# Patient Record
Sex: MALE | Race: WHITE | Employment: OTHER | URBAN - METROPOLITAN AREA
[De-identification: names, ages, dates, MRNs, and addresses within clinical notes are randomized per-mention and may not be internally consistent; named-entity substitution may affect disease eponyms.]

---

## 2017-05-18 ENCOUNTER — OFFICE VISIT (OUTPATIENT)
Dept: ORTHOPEDIC SURGERY | Age: 47
End: 2017-05-18

## 2017-05-18 VITALS — RESPIRATION RATE: 16 BRPM | BODY MASS INDEX: 23.1 KG/M2 | HEIGHT: 71 IN | WEIGHT: 165 LBS

## 2017-05-18 DIAGNOSIS — M25.511 RIGHT SHOULDER PAIN, UNSPECIFIED CHRONICITY: ICD-10-CM

## 2017-05-18 DIAGNOSIS — M75.101 TEAR OF RIGHT ROTATOR CUFF, UNSPECIFIED TEAR EXTENT: Primary | ICD-10-CM

## 2017-05-18 PROCEDURE — 99204 OFFICE O/P NEW MOD 45 MIN: CPT | Performed by: ORTHOPAEDIC SURGERY

## 2017-05-18 ASSESSMENT — ENCOUNTER SYMPTOMS
BLURRED VISION: 1
BACK PAIN: 1
RESPIRATORY NEGATIVE: 1
GASTROINTESTINAL NEGATIVE: 1

## 2017-05-31 ENCOUNTER — HOSPITAL ENCOUNTER (OUTPATIENT)
Dept: MRI IMAGING | Age: 47
Discharge: HOME | End: 2017-06-29
Attending: ORTHOPAEDIC SURGERY | Admitting: ORTHOPAEDIC SURGERY

## 2017-05-31 DIAGNOSIS — M75.101 RIGHT ROTATOR CUFF TEAR: ICD-10-CM

## 2017-06-02 ENCOUNTER — OFFICE VISIT (OUTPATIENT)
Dept: ORTHOPEDIC SURGERY | Age: 47
End: 2017-06-02

## 2017-06-02 ENCOUNTER — TELEPHONE (OUTPATIENT)
Dept: ORTHOPEDIC SURGERY | Age: 47
End: 2017-06-02

## 2017-06-02 VITALS — RESPIRATION RATE: 16 BRPM | HEIGHT: 71 IN | WEIGHT: 165 LBS | BODY MASS INDEX: 23.1 KG/M2

## 2017-06-02 DIAGNOSIS — M75.121 COMPLETE TEAR OF RIGHT ROTATOR CUFF: Primary | ICD-10-CM

## 2017-06-02 PROCEDURE — 99213 OFFICE O/P EST LOW 20 MIN: CPT | Performed by: ORTHOPAEDIC SURGERY

## 2017-06-21 ENCOUNTER — HOSPITAL ENCOUNTER (OUTPATIENT)
Dept: MRI IMAGING | Age: 47
Discharge: HOME | End: 2017-06-21
Attending: ORTHOPAEDIC SURGERY | Admitting: ORTHOPAEDIC SURGERY

## 2017-06-21 DIAGNOSIS — M75.101 RIGHT ROTATOR CUFF TEAR: ICD-10-CM

## 2017-06-21 DIAGNOSIS — M75.101 TEAR OF RIGHT ROTATOR CUFF, UNSPECIFIED TEAR EXTENT: ICD-10-CM

## 2017-06-22 ENCOUNTER — OFFICE VISIT (OUTPATIENT)
Dept: ORTHOPEDIC SURGERY | Age: 47
End: 2017-06-22

## 2017-06-22 VITALS
RESPIRATION RATE: 16 BRPM | SYSTOLIC BLOOD PRESSURE: 104 MMHG | HEIGHT: 71 IN | BODY MASS INDEX: 23.1 KG/M2 | WEIGHT: 165 LBS | DIASTOLIC BLOOD PRESSURE: 69 MMHG | HEART RATE: 88 BPM

## 2017-06-22 DIAGNOSIS — M75.121 COMPLETE TEAR OF RIGHT ROTATOR CUFF: Primary | ICD-10-CM

## 2017-06-22 PROCEDURE — 99213 OFFICE O/P EST LOW 20 MIN: CPT | Performed by: ORTHOPAEDIC SURGERY

## 2017-06-22 RX ORDER — NICOTINE 21 MG/24HR
PATCH, TRANSDERMAL 24 HOURS TRANSDERMAL
COMMUNITY
Start: 2015-11-28 | End: 2022-04-29

## 2017-06-22 RX ORDER — METHYLPREDNISOLONE 4 MG/1
TABLET ORAL
Refills: 0 | COMMUNITY
Start: 2017-06-13 | End: 2022-04-29

## 2017-06-22 RX ORDER — BUPRENORPHINE AND NALOXONE 2; .5 MG/1; MG/1
FILM, SOLUBLE BUCCAL; SUBLINGUAL
COMMUNITY
Start: 2015-11-28 | End: 2022-04-29

## 2017-07-06 ENCOUNTER — HOSPITAL ENCOUNTER (OUTPATIENT)
Dept: PREADMISSION TESTING | Age: 47
Discharge: HOME | End: 2017-08-04
Attending: ORTHOPAEDIC SURGERY | Admitting: ORTHOPAEDIC SURGERY

## 2017-07-06 ENCOUNTER — TELEPHONE (OUTPATIENT)
Dept: ORTHOPEDIC SURGERY | Age: 47
End: 2017-07-06

## 2017-07-06 DIAGNOSIS — M75.101 RIGHT ROTATOR CUFF TEAR: ICD-10-CM

## 2017-07-07 ENCOUNTER — TELEPHONE (OUTPATIENT)
Dept: ORTHOPEDIC SURGERY | Age: 47
End: 2017-07-07

## 2017-07-10 ENCOUNTER — HOSPITAL ENCOUNTER (OUTPATIENT)
Dept: SURGERY | Age: 47
Discharge: HOME | End: 2017-08-08
Attending: ORTHOPAEDIC SURGERY | Admitting: ORTHOPAEDIC SURGERY

## 2017-07-10 DIAGNOSIS — M75.101 RIGHT ROTATOR CUFF TEAR: ICD-10-CM

## 2017-07-17 ENCOUNTER — TELEPHONE (OUTPATIENT)
Dept: ORTHOPEDIC SURGERY | Age: 47
End: 2017-07-17

## 2017-08-03 ENCOUNTER — HOSPITAL ENCOUNTER (OUTPATIENT)
Dept: PREADMISSION TESTING | Age: 47
Discharge: HOME | End: 2017-09-01
Attending: ORTHOPAEDIC SURGERY | Admitting: ORTHOPAEDIC SURGERY

## 2017-12-14 ENCOUNTER — HOSPITAL ENCOUNTER (OUTPATIENT)
Dept: LAB | Age: 47
Discharge: HOME | End: 2017-12-14
Attending: FAMILY MEDICINE | Admitting: FAMILY MEDICINE

## 2017-12-14 LAB
ALBUMIN SERPL-MCNC: 4.5 GM/DL (ref 3.4–5)
ALP BLD-CCNC: 94 IU/L (ref 40–129)
ALT SERPL-CCNC: 57 U/L (ref 10–40)
ANION GAP SERPL CALCULATED.3IONS-SCNC: 13 MMOL/L (ref 4–16)
AST SERPL-CCNC: 41 IU/L (ref 15–37)
BILIRUB SERPL-MCNC: 0.7 MG/DL (ref 0–1)
BUN BLDV-MCNC: 11 MG/DL (ref 6–23)
CALCIUM SERPL-MCNC: 9.6 MG/DL (ref 8.3–10.6)
CHLORIDE BLD-SCNC: 102 MMOL/L (ref 99–110)
CHOLESTEROL: 193 MG/DL
CO2: 24 MMOL/L (ref 21–32)
CREAT SERPL-MCNC: 1 MG/DL (ref 0.9–1.3)
ERYTHROCYTE SEDIMENTATION RATE: 39 MM/HR (ref 0–15)
ESTIMATED AVERAGE GLUCOSE: 105 MG/DL
GFR AFRICAN AMERICAN: >60 ML/MIN/1.73M2
GFR NON-AFRICAN AMERICAN: >60 ML/MIN/1.73M2
GLUCOSE BLD-MCNC: 109 MG/DL (ref 70–99)
HBA1C MFR BLD: 5.3 % (ref 4.2–6.3)
HCT VFR BLD CALC: 39.4 % (ref 42–52)
HDLC SERPL-MCNC: 54 MG/DL
HEMOGLOBIN: 13.1 GM/DL (ref 13.5–18)
LDL CHOLESTEROL DIRECT: 128 MG/DL
MAGNESIUM: 2.2 MG/DL (ref 1.8–2.4)
MCH RBC QN AUTO: 29.3 PG (ref 27–31)
MCHC RBC AUTO-ENTMCNC: 33.2 % (ref 32–36)
MCV RBC AUTO: 88.1 FL (ref 78–100)
PDW BLD-RTO: 13.3 % (ref 11.7–14.9)
PHOSPHORUS: 3.4 MG/DL (ref 2.5–4.9)
PLATELET # BLD: 190 K/CU MM (ref 140–440)
PMV BLD AUTO: 11.5 FL (ref 7.5–11.1)
POTASSIUM SERPL-SCNC: 4.5 MMOL/L (ref 3.5–5.1)
PROSTATE SPECIFIC ANTIGEN: 0.37 NG/ML (ref 0–4)
RBC # BLD: 4.47 M/CU MM (ref 4.6–6.2)
SODIUM BLD-SCNC: 139 MMOL/L (ref 135–145)
T4 FREE: 1.17 NG/DL (ref 0.9–1.8)
TOTAL PROTEIN: 7.7 GM/DL (ref 6.4–8.2)
TRIGL SERPL-MCNC: 62 MG/DL
TSH HIGH SENSITIVITY: 0.4 UIU/ML (ref 0.27–4.2)
URIC ACID: 4.5 MG/DL (ref 3.5–7.2)
VITAMIN D 25-HYDROXY: 24.69 NG/ML
WBC # BLD: 5.8 K/CU MM (ref 4–10.5)

## 2018-07-05 ENCOUNTER — HOSPITAL ENCOUNTER (OUTPATIENT)
Dept: LAB | Age: 48
Discharge: HOME | End: 2018-07-05
Attending: FAMILY MEDICINE | Admitting: FAMILY MEDICINE

## 2018-07-05 LAB
ALBUMIN SERPL-MCNC: 3.6 GM/DL (ref 3.4–5)
ALBUMIN SERPL-MCNC: 3.6 GM/DL (ref 3.4–5)
ALP BLD-CCNC: 98 IU/L (ref 40–128)
ALP BLD-CCNC: 98 IU/L (ref 40–129)
ALT SERPL-CCNC: 67 U/L (ref 10–40)
ALT SERPL-CCNC: 67 U/L (ref 10–40)
ANION GAP SERPL CALCULATED.3IONS-SCNC: 11 MMOL/L (ref 4–16)
AST SERPL-CCNC: 19 IU/L (ref 15–37)
AST SERPL-CCNC: 19 IU/L (ref 15–37)
BILIRUB SERPL-MCNC: 0.3 MG/DL (ref 0–1)
BILIRUB SERPL-MCNC: 0.3 MG/DL (ref 0–1)
BILIRUBIN DIRECT: 0.2 MG/DL (ref 0–0.3)
BILIRUBIN, INDIRECT: 0.1 MG/DL (ref 0–0.7)
BUN BLDV-MCNC: 16 MG/DL (ref 6–23)
CALCIUM SERPL-MCNC: 8.9 MG/DL (ref 8.3–10.6)
CHLORIDE BLD-SCNC: 107 MMOL/L (ref 99–110)
CHOLESTEROL: 152 MG/DL
CO2: 22 MMOL/L (ref 21–32)
CREAT SERPL-MCNC: 1 MG/DL (ref 0.9–1.3)
ERYTHROCYTE SEDIMENTATION RATE: 33 MM/HR (ref 0–15)
ESTIMATED AVERAGE GLUCOSE: 126 MG/DL
GFR AFRICAN AMERICAN: >60 ML/MIN/1.73M2
GFR NON-AFRICAN AMERICAN: >60 ML/MIN/1.73M2
GLUCOSE BLD-MCNC: 104 MG/DL (ref 70–99)
HBA1C MFR BLD: 6 % (ref 4.2–6.3)
HCT VFR BLD CALC: 37.6 % (ref 42–52)
HDLC SERPL-MCNC: 35 MG/DL
HEMOGLOBIN: 12.3 GM/DL (ref 13.5–18)
LDL CHOLESTEROL CALCULATED: 95 MG/DL
MAGNESIUM: 2.2 MG/DL (ref 1.8–2.4)
MCH RBC QN AUTO: 29.9 PG (ref 27–31)
MCHC RBC AUTO-ENTMCNC: 32.7 % (ref 32–36)
MCV RBC AUTO: 91.3 FL (ref 78–100)
PDW BLD-RTO: 13.7 % (ref 11.7–14.9)
PLATELET # BLD: 211 K/CU MM (ref 140–440)
PMV BLD AUTO: 11.4 FL (ref 7.5–11.1)
POTASSIUM SERPL-SCNC: 4.6 MMOL/L (ref 3.5–5.1)
RBC # BLD: 4.12 M/CU MM (ref 4.6–6.2)
SODIUM BLD-SCNC: 140 MMOL/L (ref 135–145)
T4 FREE: 0.99 NG/DL (ref 0.9–1.8)
TOTAL PROTEIN: 6.9 GM/DL (ref 6.4–8.2)
TOTAL PROTEIN: 6.9 GM/DL (ref 6.4–8.2)
TRIGL SERPL-MCNC: 111 MG/DL
TSH HIGH SENSITIVITY: 0.41 UIU/ML (ref 0.27–4.2)
URIC ACID: 3.8 MG/DL (ref 3.5–7.2)
VITAMIN D 25-HYDROXY: 33.26 NG/ML
WBC # BLD: 7.6 K/CU MM (ref 4–10.5)

## 2018-09-04 ENCOUNTER — HOSPITAL ENCOUNTER (OUTPATIENT)
Dept: LAB | Age: 48
Discharge: HOME | End: 2018-09-04
Attending: FAMILY MEDICINE | Admitting: FAMILY MEDICINE

## 2018-09-04 LAB
FERRITIN: 104 NG/ML (ref 30–400)
HCT VFR BLD CALC: 41.1 % (ref 42–52)
HEMOGLOBIN: 13.3 GM/DL (ref 13.5–18)
IRON: 28 UG/DL (ref 59–158)
PCT TRANSFERRIN: 8 % (ref 10–44)
TOTAL IRON BINDING CAPACITY: 366 UG/DL (ref 250–450)
UNSATURATED IRON BINDING CAPACITY: 338 UG/DL (ref 110–370)

## 2019-05-01 ENCOUNTER — HOSPITAL ENCOUNTER (EMERGENCY)
Age: 49
Discharge: HOME | End: 2019-05-01

## 2019-05-01 VITALS
HEIGHT: 71 IN | RESPIRATION RATE: 16 BRPM | HEART RATE: 82 BPM | SYSTOLIC BLOOD PRESSURE: 128 MMHG | WEIGHT: 165 LBS | OXYGEN SATURATION: 99 % | BODY MASS INDEX: 23.1 KG/M2 | DIASTOLIC BLOOD PRESSURE: 88 MMHG | TEMPERATURE: 98.5 F

## 2019-05-01 DIAGNOSIS — T16.1XXA FOREIGN BODY OF RIGHT EAR, INITIAL ENCOUNTER: Primary | ICD-10-CM

## 2019-05-01 PROCEDURE — 99282 EMERGENCY DEPT VISIT SF MDM: CPT

## 2019-05-01 RX ORDER — CIPROFLOXACIN AND DEXAMETHASONE 3; 1 MG/ML; MG/ML
4 SUSPENSION/ DROPS AURICULAR (OTIC) 2 TIMES DAILY
Qty: 1 BOTTLE | Refills: 0 | Status: SHIPPED | OUTPATIENT
Start: 2019-05-01 | End: 2019-05-08

## 2019-05-01 NOTE — ED PROVIDER NOTES
Triage Chief Complaint:   Otalgia (right ear pain )    [place]:  Zaira Teixeira is a 52 y.o. male that presents toda for FB in the R ear  context is pt believes he had got a metal shard in ear 3 days ago while at [employer]ok    ROS:  REVIEW OF SYSTEMS    At least  06systems reviewed      All other review of systems are negative  See HPI and nursing notes for additional information       Past Medical History:   Diagnosis Date    Diabetes mellitus ([facility]     Hep C w/o coma, chronic ([facility]      Past Surgical History:   Procedure Laterality Date    CHOLECYSTECTOMY       History reviewed. No pertinent family history. Social History     Socioeconomic History    Marital status:       Spouse name: Not on file    Number of children: Not on file    Years of education: Not on file    Highest education level: Not on file   Occupational History    Not on file   Social Needs    Financial resource strain: Not on file    Food insecurity:     Worry: Not on file     Inability: Not on file    Transportation needs:     Medical: Not on file     Non-medical: Not on file   Tobacco Use    Smoking status: Current Every Day Smoker     Packs/day: 1.00     Types: Cigarettes    Smokeless tobacco: Never Used   Substance and Sexual Activity    Alcohol use: No    Drug use: Yes     Comment: HERION    Sexual activity: Not Currently     Partners: Female   Lifestyle    Physical activity:     Days per week: Not on file     Minutes per session: Not on file    Stress: Not on file   Relationships    Social connections:     Talks on phone: Not on file     Gets together: Not on file     Attends Denominational service: Not on file     Active member of club or organization: Not on file     Attends meetings of clubs or organizations: Not on file     Relationship status: Not on file    Intimate partner violence:     Fear of current or ex partner: Not on file     Emotionally abused: Not on file     Physically abused: Not on file     Forced sexual activity: Not on file   Other Topics Concern    Not on file   Social History Narrative    Not on file     No current facility-administered medications for this encounter. Current Outpatient Medications   Medication Sig Dispense Refill    ciprofloxacin-dexamethasone (CIPRODEX) 0.3-0.1 % otic suspension Place 4 drops in ear(s) 2 times daily for 7 days Apply in affected ear(s). Dispense QS 7 day course. 1 Bottle 0    fluticasone-vilanterol (BREO ELLIPTA) 100-25 MCG/INH AEPB inhaler Inhale into the lungs daily      gabapentin (NEURONTIN) 600 MG tablet Take 800 mg by mouth 3 times daily. Ardyth Li ibuprofen (ADVIL;MOTRIN) 800 MG tablet Take 1 tablet by mouth every 6 hours as needed for Pain 30 tablet 0    Buprenorphine HCl-Naloxone HCl 2-0.5 MG FILM Suboxone 2 mg/0.5 m tablets/strips SL q8hrs x 1 day, then  2 tablets/strips SL q12hrs x 2 days, then  2 tablets/strips SL qHS x 2 days, then Stop. May dispense either SL tablets or SL strips.     To address opioid withdrawal symptoms      nicotine (NICODERM CQ) 21 MG/24HR Nicotine 21 mg Patch- one daily x6 weeks, then  Nicotine 14 mg Patch- one daily x2 weeks, then  Nicotine 7 mg Patch- one daily x2 weeks  Dispense:  #42 of 21 mg patches, #14 of 14 mg patches, #14 of 7 mg patch    For smoking cessation      methylPREDNISolone (MEDROL DOSEPACK) 4 MG tablet TAKE 6 TABLETS ON DAY 1 AS DIRECTED ON PACKAGE AND DECREASE BY 1 TAB EACH DAY FOR A TOTAL OF 6 DAYS  0    naproxen (NAPROSYN) 500 MG tablet Take 1 tablet by mouth 2 times daily as needed for Pain 30 tablet 0    albuterol sulfate  (90 BASE) MCG/ACT inhaler Inhale 2 puffs into the lungs every 6 hours as needed for Wheezing      acetaminophen (AMINOFEN) 325 MG tablet Take 2 tablets by mouth every 6 hours as needed for Pain 120 tablet 3     Allergies   Allergen Reactions    Vicodin [Hydrocodone-Acetaminophen] Other (See Comments)     RED, SWELLING , ITCHING       Nursing Notes Reviewed    Physical Exam:  ED Triage Vitals [05/01/19 0208]   Enc Vitals Group      /88      Pulse 99      Resp 16      Temp 98.5 °F (36.9 °C)      Temp Source Oral      SpO2 97 %      Weight 165 lb (74.8 kg)      Height 5' 11\" (1.803 m)      Head Circumference       Peak Flow       Pain Score       Pain Loc       Pain Edu? Excl. in [address]? General :Patient is awake alert oriented person non toxic. cooperative  HEENT: Pupils are equally round and reactive to light extraocular motors are intact conjunctivae clear sclerae white there is no injection no icterus. Nose without any rhinorrhea or epistaxis. Oral mucosa is moist no exudate buccal mucosa shows no ulcerations. Uvula is midline    No FB in nares. Or mouth. auditory canal reveals FB. No tragal ttp. No bleeding no discharge. Neck: Neck is supple full range of motion trachea midline thyroid nonpalpable  Cardiac: Heart regular rate rhythm no murmurs rubs clicks or gallops  Lungs: Lungs are clear to auscultation there is no wheezing rhonchi or rales. There is no use of accessory muscles no nasal flaring identified. Dermatology: Skin is warm and dry there is no obvious abscesses lacerations or lesions noted  Psych: Mentation is grossly normal cognition is grossly normal. Affect is appropriate  Neuro:  Motor intact sensory intact cranial nerves II through XII are intact level of consciousness is normal cerebellar function is normal reflexes are grossly normal. No evidence of incontinence or loss of bowel or bladder no saddle anesthesia noted Lymphatic: There is no submandibular or cervical adenopathy appreciated.    ________________________________________________________________________       Procedure Note - Radha Stanley PA-C        Foreign Body Removal Procedure Note    Indication:  Foreign body in the R ear            Procedure:   During otoscopic evaluation a foreign body was visualized in the right ear which appeared to be metal.  The patient's head applicable):  Boo Harper MD  [address] [place] [address]  [phone]    In 2 days      Disposition medications (if applicable):  New Prescriptions    CIPROFLOXACIN-DEXAMETHASONE (CIPRODEX) 0.3-0.1 % OTIC SUSPENSION    Place 4 drops in ear(s) 2 times daily for 7 days Apply in affected ear(s). Dispense QS 7 day course. Comment: Please note this report has been produced using speech recognition software and may contain errors related to that system including errors in grammar, punctuation, and spelling, as well as words and phrases that may be inappropriate. If there are any questions or concerns please feel free to contact the dictating provider for clarification.       Hugo Umaña, [address] [place] [address]  05/01/19 2492

## 2019-05-01 NOTE — ED TRIAGE NOTES
Pt reports right ear feels like something is in there states he cleaned it out and noticed some blood

## 2019-05-01 NOTE — ED NOTES
Willow Narayanan at bedside to ear exam and to rinse out ear     Christiano Angelo RN  05/01/19 8206

## 2019-05-01 NOTE — ED NOTES
Discharge instructions reviewed. All questions answered to pt satisfaction. Pt alert, oriented, and ambulatory upon discharge.       Alyssa Moore RN  05/01/19 7406

## 2019-09-03 ENCOUNTER — APPOINTMENT (OUTPATIENT)
Dept: GENERAL RADIOLOGY | Age: 49
End: 2019-09-03

## 2019-09-03 ENCOUNTER — HOSPITAL ENCOUNTER (EMERGENCY)
Age: 49
Discharge: ANOTHER HOSPITAL | End: 2019-09-03
Attending: EMERGENCY MEDICINE

## 2019-09-03 VITALS
HEART RATE: 96 BPM | SYSTOLIC BLOOD PRESSURE: 138 MMHG | WEIGHT: 165 LBS | DIASTOLIC BLOOD PRESSURE: 86 MMHG | BODY MASS INDEX: 23.1 KG/M2 | HEIGHT: 71 IN | RESPIRATION RATE: 12 BRPM | OXYGEN SATURATION: 97 % | TEMPERATURE: 98.7 F

## 2019-09-03 DIAGNOSIS — M65.142 SUPPURATIVE TENOSYNOVITIS OF FLEXOR TENDON OF LEFT HAND: Primary | ICD-10-CM

## 2019-09-03 LAB
ALBUMIN SERPL-MCNC: 4 GM/DL (ref 3.4–5)
ALP BLD-CCNC: 86 IU/L (ref 40–129)
ALT SERPL-CCNC: 18 U/L (ref 10–40)
ANION GAP SERPL CALCULATED.3IONS-SCNC: 12 MMOL/L (ref 4–16)
AST SERPL-CCNC: 22 IU/L (ref 15–37)
BASOPHILS ABSOLUTE: 0 K/CU MM
BASOPHILS RELATIVE PERCENT: 0.1 % (ref 0–1)
BILIRUB SERPL-MCNC: 0.6 MG/DL (ref 0–1)
BUN BLDV-MCNC: 15 MG/DL (ref 6–23)
CALCIUM SERPL-MCNC: 8.9 MG/DL (ref 8.3–10.6)
CHLORIDE BLD-SCNC: 96 MMOL/L (ref 99–110)
CO2: 22 MMOL/L (ref 21–32)
CREAT SERPL-MCNC: 1 MG/DL (ref 0.9–1.3)
DIFFERENTIAL TYPE: ABNORMAL
EOSINOPHILS ABSOLUTE: 0 K/CU MM
EOSINOPHILS RELATIVE PERCENT: 0 % (ref 0–3)
GFR AFRICAN AMERICAN: >60 ML/MIN/1.73M2
GFR NON-AFRICAN AMERICAN: >60 ML/MIN/1.73M2
GLUCOSE BLD-MCNC: 233 MG/DL (ref 70–99)
HCT VFR BLD CALC: 33.3 % (ref 42–52)
HEMOGLOBIN: 10.3 GM/DL (ref 13.5–18)
IMMATURE NEUTROPHIL %: 0.4 % (ref 0–0.43)
LACTATE: 1.9 MMOL/L (ref 0.4–2)
LYMPHOCYTES ABSOLUTE: 0.5 K/CU MM
LYMPHOCYTES RELATIVE PERCENT: 6 % (ref 24–44)
MCH RBC QN AUTO: 28.3 PG (ref 27–31)
MCHC RBC AUTO-ENTMCNC: 30.9 % (ref 32–36)
MCV RBC AUTO: 91.5 FL (ref 78–100)
MONOCYTES ABSOLUTE: 0.6 K/CU MM
MONOCYTES RELATIVE PERCENT: 6.8 % (ref 0–4)
NUCLEATED RBC %: 0 %
PDW BLD-RTO: 13.1 % (ref 11.7–14.9)
PLATELET # BLD: 162 K/CU MM (ref 140–440)
PMV BLD AUTO: 11.3 FL (ref 7.5–11.1)
POTASSIUM SERPL-SCNC: 3.5 MMOL/L (ref 3.5–5.1)
RBC # BLD: 3.64 M/CU MM (ref 4.6–6.2)
SEGMENTED NEUTROPHILS ABSOLUTE COUNT: 7.3 K/CU MM
SEGMENTED NEUTROPHILS RELATIVE PERCENT: 86.7 % (ref 36–66)
SODIUM BLD-SCNC: 130 MMOL/L (ref 135–145)
TOTAL IMMATURE NEUTOROPHIL: 0.03 K/CU MM
TOTAL NUCLEATED RBC: 0 K/CU MM
TOTAL PROTEIN: 7.7 GM/DL (ref 6.4–8.2)
WBC # BLD: 8.4 K/CU MM (ref 4–10.5)

## 2019-09-03 PROCEDURE — 87040 BLOOD CULTURE FOR BACTERIA: CPT

## 2019-09-03 PROCEDURE — 96365 THER/PROPH/DIAG IV INF INIT: CPT

## 2019-09-03 PROCEDURE — 96367 TX/PROPH/DG ADDL SEQ IV INF: CPT

## 2019-09-03 PROCEDURE — 71045 X-RAY EXAM CHEST 1 VIEW: CPT

## 2019-09-03 PROCEDURE — 83605 ASSAY OF LACTIC ACID: CPT

## 2019-09-03 PROCEDURE — 2580000003 HC RX 258: Performed by: PHYSICIAN ASSISTANT

## 2019-09-03 PROCEDURE — 85025 COMPLETE CBC W/AUTO DIFF WBC: CPT

## 2019-09-03 PROCEDURE — 2580000003 HC RX 258

## 2019-09-03 PROCEDURE — 2580000003 HC RX 258: Performed by: EMERGENCY MEDICINE

## 2019-09-03 PROCEDURE — 99284 EMERGENCY DEPT VISIT MOD MDM: CPT

## 2019-09-03 PROCEDURE — 6360000002 HC RX W HCPCS: Performed by: EMERGENCY MEDICINE

## 2019-09-03 PROCEDURE — 73130 X-RAY EXAM OF HAND: CPT

## 2019-09-03 PROCEDURE — 80053 COMPREHEN METABOLIC PANEL: CPT

## 2019-09-03 PROCEDURE — 96366 THER/PROPH/DIAG IV INF ADDON: CPT

## 2019-09-03 PROCEDURE — 6360000002 HC RX W HCPCS: Performed by: PHYSICIAN ASSISTANT

## 2019-09-03 RX ORDER — SODIUM CHLORIDE 9 MG/ML
INJECTION, SOLUTION INTRAVENOUS
Status: COMPLETED
Start: 2019-09-03 | End: 2019-09-03

## 2019-09-03 RX ORDER — 0.9 % SODIUM CHLORIDE 0.9 %
30 INTRAVENOUS SOLUTION INTRAVENOUS ONCE
Status: COMPLETED | OUTPATIENT
Start: 2019-09-03 | End: 2019-09-03

## 2019-09-03 RX ADMIN — Medication 2000 ML: at 08:35

## 2019-09-03 RX ADMIN — CEFTRIAXONE SODIUM 1 G: 1 INJECTION, POWDER, FOR SOLUTION INTRAMUSCULAR; INTRAVENOUS at 08:36

## 2019-09-03 RX ADMIN — SODIUM CHLORIDE 2000 ML: 9 INJECTION, SOLUTION INTRAVENOUS at 08:35

## 2019-09-03 RX ADMIN — VANCOMYCIN HYDROCHLORIDE 1500 MG: 5 INJECTION, POWDER, LYOPHILIZED, FOR SOLUTION INTRAVENOUS at 09:31

## 2019-09-03 ASSESSMENT — PAIN SCALES - GENERAL: PAINLEVEL_OUTOF10: 5

## 2019-09-03 ASSESSMENT — PAIN DESCRIPTION - LOCATION: LOCATION: HAND

## 2019-09-03 ASSESSMENT — PAIN DESCRIPTION - PAIN TYPE: TYPE: ACUTE PAIN

## 2019-09-03 ASSESSMENT — PAIN DESCRIPTION - ORIENTATION: ORIENTATION: LEFT

## 2019-09-03 NOTE — ED PROVIDER NOTES
I independently examined and evaluated Claudeen Dawn. In brief, left and pain and swelling x 3-4 days. Purulent discharge from his finger. Hx of Hep C and IV drug use. Last used IV drugs last night. Denies fevers chills, chest pain or shortness of breath. Denies IV drug use in his hand. Patient does have very dirty hands and works as a . Vitals:    09/03/19 0759   BP: (!) 157/91   Pulse: 136   Resp: 18   Temp: 99.8 °F (37.7 °C)   SpO2: 92%     Focused exam revealed disheveled patient sitting up in the bed. Heart tachycardic but regular rhythm, lungs clear to auscultation bilaterally. 2+ radial pulses bilaterally. Multiple abrasions to his bilateral hands. Left middle finger is significantly swollen and held in flexion. Tender on the flexor tendon side. Small amount of purulent discharge from the distal end. ED course:  Basic labs and blood cultures obtained. Labs are unremarkable. There is diffuse swelling with no soft tissue gas on x-ray of his hand. Presentation is concerning for flexor tenosynovitis. Patient started on broad-spectrum antibiotics and will transfer him to outside hospital for further hand surgeon evaluation. Plan of care explained to patient. All questions and concerns were addressed to the patient's satisfaction. Patient understood and agreed with plan. All diagnostic, treatment, and disposition decisions were made by myself in conjunction with the advanced practice provider. For all further details of the patient's emergency department visit, please see the advanced practice provider's documentation. Comment: Please note this report has been produced using speech recognition software and may contain errors related to that system including errors in grammar, punctuation, and spelling, as well as words and phrases that may be inappropriate. If there are any questions or concerns please feel free to contact the dictating provider for clarification.

## 2019-09-03 NOTE — ED PROVIDER NOTES
(NICODERM ) 21 MG/24HR Nicotine 21 mg Patch- one daily x6 weeks, then  Nicotine 14 mg Patch- one daily x2 weeks, then  Nicotine 7 mg Patch- one daily x2 weeks  Dispense:  #42 of 21 mg patches, #14 of 14 mg patches, #14 of 7 mg patch    For smoking cessation      methylPREDNISolone (MEDROL DOSEPACK) 4 MG tablet TAKE 6 TABLETS ON DAY 1 AS DIRECTED ON PACKAGE AND DECREASE BY 1 TAB EACH DAY FOR A TOTAL OF 6 DAYS  0    naproxen (NAPROSYN) 500 MG tablet Take 1 tablet by mouth 2 times daily as needed for Pain 30 tablet 0    albuterol sulfate  (90 BASE) MCG/ACT inhaler Inhale 2 puffs into the lungs every 6 hours as needed for Wheezing      acetaminophen (AMINOFEN) 325 MG tablet Take 2 tablets by mouth every 6 hours as needed for Pain 120 tablet 3       ALLERGIES    Allergies   Allergen Reactions    Vicodin [Hydrocodone-Acetaminophen] Other (See Comments)     RED, SWELLING , ITCHING       FAMILY HISTORY/SOCIAL HISTORY    History reviewed. No pertinent family history. Social History     Socioeconomic History    Marital status:       Spouse name: None    Number of children: None    Years of education: None    Highest education level: None   Occupational History    None   Social Needs    Financial resource strain: None    Food insecurity:     Worry: None     Inability: None    Transportation needs:     Medical: None     Non-medical: None   Tobacco Use    Smoking status: Current Every Day Smoker     Packs/day: 1.00     Types: Cigarettes    Smokeless tobacco: Never Used   Substance and Sexual Activity    Alcohol use: No    Drug use: Yes     Comment: HERION    Sexual activity: Not Currently     Partners: Female   Lifestyle    Physical activity:     Days per week: None     Minutes per session: None    Stress: None   Relationships    Social connections:     Talks on phone: None     Gets together: None     Attends Sikhism service: None     Active member of club or organization: None     Attends Lymphocytes Absolute 0.5 K/CU MM    Monocytes Absolute 0.6 K/CU MM    Eosinophils Absolute 0.0 K/CU MM    Basophils Absolute 0.0 K/CU MM    Nucleated RBC % 0.0 %    Total Nucleated RBC 0.0 K/CU MM    Total Immature Neutrophil 0.03 K/CU MM    Immature Neutrophil % 0.4 0 - 0.43 %   CMP   Result Value Ref Range    Sodium 130 (L) 135 - 145 MMOL/L    Potassium 3.5 3.5 - 5.1 MMOL/L    Chloride 96 (L) 99 - 110 mMol/L    CO2 22 21 - 32 MMOL/L    BUN 15 6 - 23 MG/DL    CREATININE 1.0 0.9 - 1.3 MG/DL    Glucose 233 (H) 70 - 99 MG/DL    Calcium 8.9 8.3 - 10.6 MG/DL    Alb 4.0 3.4 - 5.0 GM/DL    Total Protein 7.7 6.4 - 8.2 GM/DL    Total Bilirubin 0.6 0.0 - 1.0 MG/DL    ALT 18 10 - 40 U/L    AST 22 15 - 37 IU/L    Alkaline Phosphatase 86 40 - 129 IU/L    GFR Non-African American >60 >60 mL/min/1.73m2    GFR African American >60 >60 mL/min/1.73m2    Anion Gap 12 4 - 16   Lactic Acid, Plasma   Result Value Ref Range    Lactate 1.9 0.4 - 2.0 mMOL/L     IMAGING:  Xr Hand Left (min 3 Views)    Result Date: 9/3/2019  EXAMINATION: THREE XRAY VIEWS OF THE LEFT HAND 9/3/2019 8:33 am COMPARISON: 02/25/2015 HISTORY: ORDERING SYSTEM PROVIDED HISTORY: middle finger infection TECHNOLOGIST PROVIDED HISTORY: Reason for exam:->middle finger infection Reason for Exam: pain swelling   seeping pus-nki Acuity: Acute Type of Exam: Initial Relevant Medical/Surgical History: diabetes FINDINGS: There is diffuse soft tissue swelling of the 3rd digit. There is no radiopaque foreign body or soft tissue gas identified. No bony destructive process is identified. Alignment is normal.  There is no acute fracture. Diffuse soft tissue swelling of the 3rd digit without radiopaque foreign body, soft tissue gas or bony destructive process evident.      Xr Chest Portable    Result Date: 9/3/2019  EXAMINATION: ONE XRAY VIEW OF THE CHEST 9/3/2019 8:33 am COMPARISON: 07/22/2018 HISTORY: ORDERING SYSTEM PROVIDED HISTORY: ro pneumonia, infection TECHNOLOGIST may contain errors related to that system including errors in grammar, punctuation, and spelling, as well as words and phrases that may be inappropriate. If there are any questions or concerns please feel free to contact the dictating provider for clarification.       Michell Lozano 411, PA  09/04/19 1914

## 2019-09-08 LAB
CULTURE: NORMAL
CULTURE: NORMAL
Lab: NORMAL
Lab: NORMAL
SPECIMEN: NORMAL
SPECIMEN: NORMAL

## 2019-11-11 ENCOUNTER — HOSPITAL ENCOUNTER (EMERGENCY)
Age: 49
Discharge: HOME | End: 2019-11-11

## 2019-11-11 ENCOUNTER — APPOINTMENT (OUTPATIENT)
Dept: GENERAL RADIOLOGY | Age: 49
End: 2019-11-11

## 2019-11-11 VITALS
WEIGHT: 170 LBS | RESPIRATION RATE: 19 BRPM | HEART RATE: 104 BPM | HEIGHT: 71 IN | TEMPERATURE: 98.4 F | DIASTOLIC BLOOD PRESSURE: 87 MMHG | BODY MASS INDEX: 23.8 KG/M2 | OXYGEN SATURATION: 97 % | SYSTOLIC BLOOD PRESSURE: 131 MMHG
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
## 2019-11-11 PROCEDURE — 90715 TDAP VACCINE 7 YRS/> IM: CPT | Performed by: PHYSICIAN ASSISTANT

## 2019-11-11 PROCEDURE — 6360000002 HC RX W HCPCS: Performed by: PHYSICIAN ASSISTANT

## 2019-11-11 PROCEDURE — 73590 X-RAY EXAM OF LOWER LEG: CPT

## 2019-11-11 PROCEDURE — 99283 EMERGENCY DEPT VISIT LOW MDM: CPT

## 2019-11-11 PROCEDURE — 6370000000 HC RX 637 (ALT 250 FOR IP): Performed by: PHYSICIAN ASSISTANT

## 2019-11-11 PROCEDURE — 90471 IMMUNIZATION ADMIN: CPT | Performed by: PHYSICIAN ASSISTANT

## 2019-11-11 RX ORDER — NAPROXEN 500 MG/1
500 TABLET ORAL 2 TIMES DAILY
Qty: 15 TABLET | Refills: 0 | Status: SHIPPED | OUTPATIENT
Start: 2019-11-11 | End: 2020-11-25

## 2019-11-11 RX ORDER — AMOXICILLIN AND CLAVULANATE POTASSIUM 875; 125 MG/1; MG/1
1 TABLET, FILM COATED ORAL ONCE
Status: COMPLETED | OUTPATIENT
Start: 2019-11-11 | End: 2019-11-11

## 2019-11-11 RX ORDER — AMOXICILLIN AND CLAVULANATE POTASSIUM 875; 125 MG/1; MG/1
1 TABLET, FILM COATED ORAL 2 TIMES DAILY
Qty: 14 TABLET | Refills: 0 | Status: SHIPPED | OUTPATIENT
Start: 2019-11-11 | End: 2019-11-18

## 2019-11-11 RX ORDER — NAPROXEN 250 MG/1
500 TABLET ORAL ONCE
Status: COMPLETED | OUTPATIENT
Start: 2019-11-11 | End: 2019-11-11

## 2019-11-11 RX ADMIN — TETANUS TOXOID, REDUCED DIPHTHERIA TOXOID AND ACELLULAR PERTUSSIS VACCINE, ADSORBED 0.5 ML: 5; 2.5; 8; 8; 2.5 SUSPENSION INTRAMUSCULAR at 13:58

## 2019-11-11 RX ADMIN — AMOXICILLIN AND CLAVULANATE POTASSIUM 1 TABLET: 875; 125 TABLET, FILM COATED ORAL at 13:58

## 2019-11-11 RX ADMIN — NAPROXEN 500 MG: 250 TABLET ORAL at 13:58

## 2019-11-11 ASSESSMENT — PAIN SCALES - GENERAL
PAINLEVEL_OUTOF10: 5
PAINLEVEL_OUTOF10: 7

## 2019-11-11 ASSESSMENT — PAIN DESCRIPTION - PAIN TYPE: TYPE: ACUTE PAIN

## 2019-11-11 ASSESSMENT — PAIN DESCRIPTION - LOCATION: LOCATION: LEG

## 2019-11-11 ASSESSMENT — PAIN DESCRIPTION - ORIENTATION: ORIENTATION: LEFT;LOWER

## 2020-07-25 ENCOUNTER — HOSPITAL ENCOUNTER (EMERGENCY)
Age: 50
Discharge: HOME | End: 2020-07-25
Payer: MEDICAID

## 2020-07-25 VITALS
SYSTOLIC BLOOD PRESSURE: 119 MMHG | TEMPERATURE: 98.2 F | HEART RATE: 87 BPM | RESPIRATION RATE: 16 BRPM | DIASTOLIC BLOOD PRESSURE: 72 MMHG | OXYGEN SATURATION: 96 %

## 2020-07-25 PROCEDURE — 6370000000 HC RX 637 (ALT 250 FOR IP): Performed by: PHYSICIAN ASSISTANT

## 2020-07-25 PROCEDURE — 99282 EMERGENCY DEPT VISIT SF MDM: CPT

## 2020-07-25 PROCEDURE — 4500000027

## 2020-07-25 RX ORDER — CEPHALEXIN 250 MG/1
500 CAPSULE ORAL ONCE
Status: COMPLETED | OUTPATIENT
Start: 2020-07-25 | End: 2020-07-25

## 2020-07-25 RX ORDER — IBUPROFEN 200 MG
TABLET ORAL 4 TIMES DAILY
Status: DISCONTINUED | OUTPATIENT
Start: 2020-07-25 | End: 2020-07-25 | Stop reason: HOSPADM

## 2020-07-25 RX ORDER — CEPHALEXIN 500 MG/1
500 CAPSULE ORAL 3 TIMES DAILY
Qty: 21 CAPSULE | Refills: 0 | Status: SHIPPED | OUTPATIENT
Start: 2020-07-25 | End: 2020-08-01

## 2020-07-25 RX ADMIN — CEPHALEXIN 500 MG: 250 CAPSULE ORAL at 04:57

## 2020-07-25 ASSESSMENT — PAIN DESCRIPTION - LOCATION
LOCATION: WRIST
LOCATION: WRIST

## 2020-07-25 ASSESSMENT — PAIN DESCRIPTION - ORIENTATION
ORIENTATION: LEFT
ORIENTATION: LEFT

## 2020-07-25 ASSESSMENT — PAIN SCALES - GENERAL: PAINLEVEL_OUTOF10: 8

## 2020-07-25 ASSESSMENT — PAIN DESCRIPTION - PAIN TYPE
TYPE: ACUTE PAIN
TYPE: ACUTE PAIN

## 2020-07-25 ASSESSMENT — PAIN DESCRIPTION - DESCRIPTORS
DESCRIPTORS: ACHING;NAGGING
DESCRIPTORS: BURNING

## 2020-07-25 ASSESSMENT — PAIN DESCRIPTION - FREQUENCY: FREQUENCY: CONTINUOUS

## 2020-07-25 NOTE — ED PROVIDER NOTES
eMERGENCY dEPARTMENT eNCOUnter        PCP: No primary care provider on file. CHIEF COMPLAINT    Chief Complaint   Patient presents with    Laceration     left wrist cut by a chainsaw     Pt was not seen by physician    HPI    Kelli Reeves is a 48 y.o. male who presents with laceration to his left wrist that occurred yesterday afternoon while working in a tree with a chainsaw. Patient states that he was able to finish the job. No distal numbness, tingling, weakness, functional/motor deficit. Pt denies foreign body sensation. Up to date Tetanus Status    REVIEW OF SYSTEMS    General:   Denies symptoms preceding injury. Skin: + Laceration. SEE HPI  Musculoskeletal:  No distal numbness, tingling. No obvious tendon or motor deficits. Denies any other musculoskeletal injuries or skin trauma. All other review of systems are negative  See HPI and nursing notes for additional information     PAST MEDICAL & SURGICAL HISTORY    Past Medical History:   Diagnosis Date    Diabetes mellitus ([facility] [address]     Hep C w/o coma, chronic ([facility])      Past Surgical History:   Procedure Laterality Date    CHOLECYSTECTOMY         CURRENT MEDICATIONS    Current Outpatient Rx   Medication Sig Dispense Refill    naproxen (NAPROSYN) 500 MG tablet Take 1 tablet by mouth 2 times daily 15 tablet 0    fluticasone-vilanterol (BREO ELLIPTA) 100-25 MCG/INH AEPB inhaler Inhale into the lungs daily      gabapentin (NEURONTIN) 600 MG tablet Take 800 mg by mouth 3 times daily. Delorse Isaac ibuprofen (ADVIL;MOTRIN) 800 MG tablet Take 1 tablet by mouth every 6 hours as needed for Pain 30 tablet 0    Buprenorphine HCl-Naloxone HCl 2-0.5 MG FILM Suboxone 2 mg/0.5 m tablets/strips SL q8hrs x 1 day, then  2 tablets/strips SL q12hrs x 2 days, then  2 tablets/strips SL qHS x 2 days, then Stop. May dispense either SL tablets or SL strips.     To address opioid withdrawal symptoms      nicotine (NICODERM CQ) 21 MG/24HR Nicotine 21 mg Patch- one 4.0 ethilon sutures, total number of 14 simple interrupted  - Hemostasis and good cosmesis was achieved. Blood loss minimal.  - The wound area was then dressed with Sterile nonstick dressing, sterile gauze, and tape. - Patient tolerated procedure well without complications. Total repaired wound length: 7cm  ________________________________________________________________________          ED COURSE & MEDICAL DECISION MAKING       Vital signs and nursing notes reviewed during ED course. I have independently evaluated this patient. All pertinent Lab data and radiographic results reviewed with patient at bedside. The patient and/or the family were informed of the results of any tests/labs/imaging, the treatment plan, and time was allotted to answer questions. I discussed possibility of infection, retained foreign body, tendon injury, nerve injury. Clinical  IMPRESSION    1. Laceration of left wrist, initial encounter      Patient presents as above a laceration to the left wrist.  Accidentally obtained with a chainsaw while working on a tree. He delayed coming to the ED but did present within 12 hours. He declined x-ray evaluation. His tetanus is up-to-date. He is willing to assume risks of any bony involvement or retained foreign bodies. Laceration repaired as above. Wound care instructions discussed with patient today. Wound check in 2-3 days. Suture/Staple removal in 12-14 days. (discussed today). Diagnosis and plan discussed in detail with patient who understands and agrees. Return to emergency Department precautions were discussed in detail with patient who understands and agrees. Comment: Please note this report has been produced using speech recognition software and may contain errors related to that system including errors in grammar, punctuation, and spelling, as well as words and phrases that may be inappropriate.  If there are any questions or concerns please feel free to contact the dictating provider for clarification.         Carloz Dailey PA-C  07/25/20 5897

## 2020-07-25 NOTE — ED NOTES
Pt presents to ED for laceration to left wrist. 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

## 2020-11-25 ENCOUNTER — HOSPITAL ENCOUNTER (EMERGENCY)
Age: 50
Discharge: HOME | End: 2020-11-25
Payer: COMMERCIAL

## 2020-11-25 ENCOUNTER — APPOINTMENT (OUTPATIENT)
Dept: GENERAL RADIOLOGY | Age: 50
End: 2020-11-25
Payer: COMMERCIAL

## 2020-11-25 VITALS
BODY MASS INDEX: 23.8 KG/M2 | WEIGHT: 170 LBS | SYSTOLIC BLOOD PRESSURE: 119 MMHG | RESPIRATION RATE: 17 BRPM | HEIGHT: 71 IN | HEART RATE: 80 BPM | DIASTOLIC BLOOD PRESSURE: 61 MMHG | OXYGEN SATURATION: 100 % | TEMPERATURE: 98.2 F

## 2020-11-25 PROCEDURE — 6370000000 HC RX 637 (ALT 250 FOR IP): Performed by: PHYSICIAN ASSISTANT

## 2020-11-25 PROCEDURE — 99284 EMERGENCY DEPT VISIT MOD MDM: CPT

## 2020-11-25 PROCEDURE — 73130 X-RAY EXAM OF HAND: CPT

## 2020-11-25 PROCEDURE — 73110 X-RAY EXAM OF WRIST: CPT

## 2020-11-25 RX ORDER — NAPROXEN 500 MG/1
500 TABLET ORAL 2 TIMES DAILY
Qty: 15 TABLET | Refills: 0 | Status: SHIPPED | OUTPATIENT
Start: 2020-11-25 | End: 2022-04-29

## 2020-11-25 RX ORDER — NAPROXEN 250 MG/1
500 TABLET ORAL ONCE
Status: COMPLETED | OUTPATIENT
Start: 2020-11-25 | End: 2020-11-25

## 2020-11-25 RX ADMIN — NAPROXEN 500 MG: 250 TABLET ORAL at 13:04

## 2020-11-25 ASSESSMENT — PAIN SCALES - GENERAL
PAINLEVEL_OUTOF10: 10
PAINLEVEL_OUTOF10: 10

## 2020-11-25 NOTE — ED PROVIDER NOTES
tablets/strips SL q8hrs x 1 day, then  2 tablets/strips SL q12hrs x 2 days, then  2 tablets/strips SL qHS x 2 days, then Stop. May dispense either SL tablets or SL strips. To address opioid withdrawal symptoms      nicotine (NICODERM CQ) 21 MG/24HR Nicotine 21 mg Patch- one daily x6 weeks, then  Nicotine 14 mg Patch- one daily x2 weeks, then  Nicotine 7 mg Patch- one daily x2 weeks  Dispense:  #42 of 21 mg patches, #14 of 14 mg patches, #14 of 7 mg patch    For smoking cessation      methylPREDNISolone (MEDROL DOSEPACK) 4 MG tablet TAKE 6 TABLETS ON DAY 1 AS DIRECTED ON PACKAGE AND DECREASE BY 1 TAB EACH DAY FOR A TOTAL OF 6 DAYS  0    albuterol sulfate  (90 BASE) MCG/ACT inhaler Inhale 2 puffs into the lungs every 6 hours as needed for Wheezing      acetaminophen (AMINOFEN) 325 MG tablet Take 2 tablets by mouth every 6 hours as needed for Pain 120 tablet 3       ALLERGIES    Allergies   Allergen Reactions    Vicodin [Hydrocodone-Acetaminophen] Other (See Comments)     RED, SWELLING , ITCHING       SOCIAL & FAMILY HISTORY    Social History     Socioeconomic History    Marital status:       Spouse name: None    Number of children: None    Years of education: None    Highest education level: None   Occupational History    None   Social Needs    Financial resource strain: None    Food insecurity     Worry: None     Inability: None    Transportation needs     Medical: None     Non-medical: None   Tobacco Use    Smoking status: Current Every Day Smoker     Packs/day: 1.00     Types: Cigarettes    Smokeless tobacco: Never Used   Substance and Sexual Activity    Alcohol use: No    Drug use: Yes     Comment: HERION    Sexual activity: Not Currently     Partners: Female   Lifestyle    Physical activity     Days per week: None     Minutes per session: None    Stress: None   Relationships    Social connections     Talks on phone: None     Gets together: None     Attends Latter-day service: None     Active member of club or organization: None     Attends meetings of clubs or organizations: None     Relationship status: None    Intimate partner violence     Fear of current or ex partner: None     Emotionally abused: None     Physically abused: None     Forced sexual activity: None   Other Topics Concern    None   Social History Narrative    None     History reviewed. No pertinent family history. PHYSICAL EXAM    VITAL SIGNS: /61   Pulse 80   Temp 98.2 °F (36.8 °C) (Oral)   Resp 17   Ht 5' 11\" (1.803 m)   Wt 170 lb (77.1 kg)   SpO2 100%   BMI 23.71 kg/m²   Constitutional:  Well developed, well nourished, no acute distress, non-toxic appearance   HENT:  Atraumatic  Musculoskeletal:    Right  Hand and wrist:  There is mild swelling over dorsum of hand and distal wrist.  No overlying erythema or ecchymosis. Mild tenderness over dorsum of hand, distal wrist.  No palpable defect. No induration or fluctuance. Normal range of motion. No elbow tenderness. Distal sensation and capillary refill intact. Integument:  Well hydrated, no rash. skin intact  Vascular: affected extremity distally neurovascularly intact - sensation and capillary refill intact. Neurologic:  Alert and oriented. Appropriate thought pattern. Psychiatric: Cooperative, pleasant affect    RADIOLOGY/PROCEDURES    XR WRIST RIGHT (MIN 3 VIEWS)   Final Result   Soft tissue swelling about the wrist without radiographic evidence of acute   fracture or dislocation of the right hand and wrist seen. If there is a   clinical concern for occult fracture, particularly of scaphoid, recommend   follow-up examination in 7-10 days. XR HAND RIGHT (MIN 3 VIEWS)   Final Result   Soft tissue swelling about the wrist without radiographic evidence of acute   fracture or dislocation of the right hand and wrist seen.   If there is a   clinical concern for occult fracture, particularly of scaphoid, recommend   follow-up examination in 7-10 days. ED COURSE & MEDICAL DECISION MAKING      Patient presents as above. Patient provided ice pack and naproxen. Swelling does not appear infectious, there is no erythema induration or fluctuance. Right hand and wrist x-ray shows no acute osseous abnormality. I discussed imaging results with patient today. I recommend rest, ice, compression, elevation. Patient provided Ace wrap and wrist splint. Patient provided prescription for naproxen. I discussed with patient that I do not see signs of infection at this time, discussed signs of infection return immediately if these develop. Recommend follow-up with primary care provider in 3 days for recheck. Clinical  IMPRESSION    1. Right hand pain    2. Right wrist pain            Diagnosis and plan discussed in detail with patient who understands and agrees. Patient agrees to return emergency department if symptoms worsen or any new symptoms develop. Comment: Please note this report has been produced using speech recognition software and may contain errors related to that system including errors in grammar, punctuation, and spelling, as well as words and phrases that may be inappropriate. If there are any questions or concerns please feel free to contact the dictating provider for clarification.         Angely LouisrNETTA  11/25/20 3674

## 2020-11-25 NOTE — ED NOTES
Discharge instructions reviewed with pt. All questions answered at this time. Pt verbalized understanding.       Heike Samuel RN  11/25/20 5436

## 2021-05-11 ENCOUNTER — HOSPITAL ENCOUNTER (EMERGENCY)
Age: 51
Discharge: HOME | End: 2021-05-11
Payer: MEDICARE

## 2021-05-11 VITALS
TEMPERATURE: 98.3 F | SYSTOLIC BLOOD PRESSURE: 145 MMHG | HEIGHT: 71 IN | DIASTOLIC BLOOD PRESSURE: 85 MMHG | HEART RATE: 90 BPM | RESPIRATION RATE: 18 BRPM | OXYGEN SATURATION: 96 % | BODY MASS INDEX: 23.8 KG/M2 | WEIGHT: 170 LBS

## 2021-05-11 DIAGNOSIS — B02.9 HERPES ZOSTER WITHOUT COMPLICATION: Primary | ICD-10-CM

## 2021-05-11 PROCEDURE — 99285 EMERGENCY DEPT VISIT HI MDM: CPT

## 2021-05-11 PROCEDURE — 6370000000 HC RX 637 (ALT 250 FOR IP): Performed by: PHYSICIAN ASSISTANT

## 2021-05-11 RX ORDER — ONDANSETRON 4 MG/1
4 TABLET, FILM COATED ORAL EVERY 8 HOURS PRN
Qty: 10 TABLET | Refills: 0 | Status: SHIPPED | OUTPATIENT
Start: 2021-05-11 | End: 2022-04-29

## 2021-05-11 RX ORDER — ACYCLOVIR 400 MG/1
800 TABLET ORAL
Qty: 70 TABLET | Refills: 0 | Status: SHIPPED | OUTPATIENT
Start: 2021-05-11 | End: 2021-05-18

## 2021-05-11 RX ORDER — LIDOCAINE AND PRILOCAINE 25; 25 MG/G; MG/G
CREAM TOPICAL ONCE
Status: COMPLETED | OUTPATIENT
Start: 2021-05-11 | End: 2021-05-11

## 2021-05-11 RX ORDER — LIDOCAINE 5% 5 G/100G
1 CREAM TOPICAL DAILY
Qty: 1 TUBE | Refills: 0 | Status: SHIPPED | OUTPATIENT
Start: 2021-05-11 | End: 2022-04-29

## 2021-05-11 RX ORDER — OXYCODONE HYDROCHLORIDE AND ACETAMINOPHEN 5; 325 MG/1; MG/1
1 TABLET ORAL ONCE
Status: COMPLETED | OUTPATIENT
Start: 2021-05-11 | End: 2021-05-11

## 2021-05-11 RX ORDER — PREDNISONE 20 MG/1
60 TABLET ORAL ONCE
Status: COMPLETED | OUTPATIENT
Start: 2021-05-11 | End: 2021-05-11

## 2021-05-11 RX ORDER — GABAPENTIN 100 MG/1
100 CAPSULE ORAL 2 TIMES DAILY
Qty: 20 CAPSULE | Refills: 0 | Status: SHIPPED | OUTPATIENT
Start: 2021-05-11 | End: 2022-04-29

## 2021-05-11 RX ORDER — GABAPENTIN 300 MG/1
300 CAPSULE ORAL ONCE
Status: COMPLETED | OUTPATIENT
Start: 2021-05-11 | End: 2021-05-11

## 2021-05-11 RX ORDER — ACYCLOVIR 800 MG/1
800 TABLET ORAL ONCE
Status: COMPLETED | OUTPATIENT
Start: 2021-05-11 | End: 2021-05-11

## 2021-05-11 RX ORDER — PREDNISONE 20 MG/1
TABLET ORAL
Qty: 24 TABLET | Refills: 0 | Status: SHIPPED | OUTPATIENT
Start: 2021-05-11 | End: 2022-04-29

## 2021-05-11 RX ORDER — ONDANSETRON 4 MG/1
4 TABLET, ORALLY DISINTEGRATING ORAL ONCE
Status: COMPLETED | OUTPATIENT
Start: 2021-05-11 | End: 2021-05-11

## 2021-05-11 RX ADMIN — PREDNISONE 60 MG: 20 TABLET ORAL at 07:06

## 2021-05-11 RX ADMIN — OXYCODONE HYDROCHLORIDE AND ACETAMINOPHEN 1 TABLET: 5; 325 TABLET ORAL at 07:06

## 2021-05-11 RX ADMIN — GABAPENTIN 300 MG: 300 CAPSULE ORAL at 07:06

## 2021-05-11 RX ADMIN — ACYCLOVIR 800 MG: 800 TABLET ORAL at 07:30

## 2021-05-11 RX ADMIN — LIDOCAINE AND PRILOCAINE: 25; 25 CREAM TOPICAL at 07:56

## 2021-05-11 RX ADMIN — ONDANSETRON 4 MG: 4 TABLET, ORALLY DISINTEGRATING ORAL at 07:06

## 2021-05-11 ASSESSMENT — PAIN SCALES - GENERAL
PAINLEVEL_OUTOF10: 10
PAINLEVEL_OUTOF10: 5

## 2021-05-11 NOTE — ED PROVIDER NOTES
EMERGENCY DEPARTMENT ENCOUNTER      PCP: No primary care provider on file. CHIEF COMPLAINT    Chief Complaint   Patient presents with    Rash     This patient was not evaluated by the attending physician. I have independently evaluated this patient . HPI    Edel Saavedra is a 46 y.o. male who presents to the emergency department today with a painful, vesicular, erythematous rash that has developed over the last 24 hours. He states that he awoke yesterday morning and had a painful rash has developed and worsened. It is in a dermatomal pattern of the left upper chest wall, posterior back. Appears as classic shingles. He denies any history of shingles. There are some crusted lesions to the back but there are active vesicles to the lateral and anterior chest wall. Patient does have a history of IV drug abuse, he states that his last use was 2 days ago. Has a history of hepatitis C. He is not receiving active treatment. He denies any other medical issues. REVIEW OF SYSTEMS    General: Denies fevers or syncope  ENT: Denies throat swelling or tongue swelling  Pulmonary: Denies wheezes, difficulty breathing,  or chest tightness  Skin: See HPI  All other review of systems are negative  See HPI and nursing notes for additional information     PAST MEDICAL & SURGICAL HISTORY    Past Medical History:   Diagnosis Date    Diabetes mellitus ([facility] [address]     Hep C w/o coma, chronic (HCC)      Past Surgical History:   Procedure Laterality Date    CHOLECYSTECTOMY         CURRENT MEDICATIONS    Current Outpatient Rx   Medication Sig Dispense Refill    acyclovir (ZOVIRAX) 400 MG tablet Take 2 tablets by mouth every 4 hours (while awake) for 7 days 70 tablet 0    gabapentin (NEURONTIN) 100 MG capsule Take 1 capsule by mouth 2 times daily for 10 days.  20 capsule 0    ondansetron (ZOFRAN) 4 MG tablet Take 1 tablet by mouth every 8 hours as needed for Nausea 10 tablet 0    predniSONE (DELTASONE) 20 MG tablet Take 3 tabs PO qd for 4 days, then 2 tabs PO qd for 4 days, then 1 tab PO qd for 4 days, Disp-24 tablet, R-0 24 tablet 0    Lidocaine 5 % CREA Apply 1 Film topically daily 1 Tube 0    naproxen (NAPROSYN) 500 MG tablet Take 1 tablet by mouth 2 times daily 15 tablet 0    fluticasone-vilanterol (BREO ELLIPTA) 100-25 MCG/INH AEPB inhaler Inhale into the lungs daily      ibuprofen (ADVIL;MOTRIN) 800 MG tablet Take 1 tablet by mouth every 6 hours as needed for Pain 30 tablet 0    Buprenorphine HCl-Naloxone HCl 2-0.5 MG FILM Suboxone 2 mg/0.5 m tablets/strips SL q8hrs x 1 day, then  2 tablets/strips SL q12hrs x 2 days, then  2 tablets/strips SL qHS x 2 days, then Stop. May dispense either SL tablets or SL strips. To address opioid withdrawal symptoms      nicotine (NICODERM CQ) 21 MG/24HR Nicotine 21 mg Patch- one daily x6 weeks, then  Nicotine 14 mg Patch- one daily x2 weeks, then  Nicotine 7 mg Patch- one daily x2 weeks  Dispense:  #42 of 21 mg patches, #14 of 14 mg patches, #14 of 7 mg patch    For smoking cessation      methylPREDNISolone (MEDROL DOSEPACK) 4 MG tablet TAKE 6 TABLETS ON DAY 1 AS DIRECTED ON PACKAGE AND DECREASE BY 1 TAB EACH DAY FOR A TOTAL OF 6 DAYS  0    albuterol sulfate  (90 BASE) MCG/ACT inhaler Inhale 2 puffs into the lungs every 6 hours as needed for Wheezing      acetaminophen (AMINOFEN) 325 MG tablet Take 2 tablets by mouth every 6 hours as needed for Pain 120 tablet 3       ALLERGIES    Allergies   Allergen Reactions    Vicodin [Hydrocodone-Acetaminophen] Other (See Comments)     RED, SWELLING , ITCHING       SOCIAL & FAMILY HISTORY    Social History     Socioeconomic History    Marital status:       Spouse name: None    Number of children: None    Years of education: None    Highest education level: None   Occupational History    None   Social Needs    Financial resource strain: None    Food insecurity     Worry: None     Inability: None    Transportation Patient presents as above. Emergent etiologies considered. Physical and historical exam findings are most consistent with a herpes zoster rash. No signs of superimposed infection. No history of shingles. Patient does have a history of IV drug abuse, he states that he used 2 days ago. He is also has a history of hepatitis C. Will treat him with acyclovir for an immunocompromised patient. He also be given a taper of steroid. Patient is in excruciating pain during my encounter, we will provide narcotic pain medication while in the ED. He will be given gabapentin for neuropathic pain, we also placed some EMLA cream around the rash. He will be sent home with gabapentin, a lidocaine cream to place. He also be given a taper of steroids and the antiviral.  Patient was counseled and educated on the course of herpes ulcer, he will also be advised to keep it clean and covered. He will otherwise be discharged in stable condition. Return to emergency Department warning signs discussed in detail with patient today who understands and agrees including but not limited to worsening rash,fever, mouth/tongue/lip swelling, trouble breathing or swallowing, or any new symptoms not present today. Vital signs and nursing notes reviewed during ED course. Clinical  IMPRESSION    1. Herpes zoster without complication      Comment: Please note this report has been produced using speech recognition software and may contain errors related to that system including errors in grammar, punctuation, and spelling, as well as words and phrases that may be inappropriate. If there are any questions or concerns please feel free to contact the dictating provider for clarification.       Michell Lozano 411, PA  05/11/21 5672

## 2022-04-29 ENCOUNTER — HOSPITAL ENCOUNTER (EMERGENCY)
Age: 52
Discharge: HOME | End: 2022-04-29
Attending: EMERGENCY MEDICINE
Payer: MEDICARE

## 2022-04-29 VITALS
SYSTOLIC BLOOD PRESSURE: 156 MMHG | HEART RATE: 89 BPM | BODY MASS INDEX: 24.5 KG/M2 | DIASTOLIC BLOOD PRESSURE: 99 MMHG | TEMPERATURE: 98.2 F | WEIGHT: 175 LBS | OXYGEN SATURATION: 99 % | HEIGHT: 71 IN | RESPIRATION RATE: 20 BRPM

## 2022-04-29 DIAGNOSIS — S91.312A LACERATION OF LEFT FOOT, INITIAL ENCOUNTER: ICD-10-CM
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
## 2022-04-29 PROCEDURE — 12002 RPR S/N/AX/GEN/TRNK2.6-7.5CM: CPT

## 2022-04-29 PROCEDURE — 12004 RPR S/N/AX/GEN/TRK7.6-12.5CM: CPT

## 2022-04-29 PROCEDURE — 2500000003 HC RX 250 WO HCPCS: Performed by: EMERGENCY MEDICINE

## 2022-04-29 PROCEDURE — 99283 EMERGENCY DEPT VISIT LOW MDM: CPT

## 2022-04-29 PROCEDURE — 6370000000 HC RX 637 (ALT 250 FOR IP): Performed by: EMERGENCY MEDICINE

## 2022-04-29 RX ORDER — LIDOCAINE HYDROCHLORIDE 10 MG/ML
10 INJECTION, SOLUTION EPIDURAL; INFILTRATION; INTRACAUDAL; PERINEURAL ONCE
Status: COMPLETED | OUTPATIENT
Start: 2022-04-29 | End: 2022-04-29

## 2022-04-29 RX ORDER — IBUPROFEN 400 MG/1
600 TABLET ORAL ONCE
Status: COMPLETED | OUTPATIENT
Start: 2022-04-29 | End: 2022-04-29

## 2022-04-29 RX ORDER — AMOXICILLIN AND CLAVULANATE POTASSIUM 875; 125 MG/1; MG/1
1 TABLET, FILM COATED ORAL 2 TIMES DAILY
Qty: 20 TABLET | Refills: 0 | Status: SHIPPED | OUTPATIENT
Start: 2022-04-29 | End: 2022-05-09

## 2022-04-29 RX ORDER — IBUPROFEN 600 MG/1
600 TABLET ORAL 3 TIMES DAILY PRN
Qty: 30 TABLET | Refills: 0 | Status: SHIPPED | OUTPATIENT
Start: 2022-04-29

## 2022-04-29 RX ORDER — AMOXICILLIN AND CLAVULANATE POTASSIUM 875; 125 MG/1; MG/1
1 TABLET, FILM COATED ORAL ONCE
Status: COMPLETED | OUTPATIENT
Start: 2022-04-29 | End: 2022-04-29

## 2022-04-29 RX ORDER — DIAPER,BRIEF,INFANT-TODD,DISP
EACH MISCELLANEOUS ONCE
Status: COMPLETED | OUTPATIENT
Start: 2022-04-29 | End: 2022-04-29

## 2022-04-29 RX ADMIN — BACITRACIN ZINC: 500 OINTMENT TOPICAL at 01:44

## 2022-04-29 RX ADMIN — AMOXICILLIN AND CLAVULANATE POTASSIUM 1 TABLET: 875; 125 TABLET, FILM COATED ORAL at 00:31

## 2022-04-29 RX ADMIN — LIDOCAINE HYDROCHLORIDE 10 ML: 10 INJECTION, SOLUTION EPIDURAL; INFILTRATION; INTRACAUDAL; PERINEURAL at 01:14

## 2022-04-29 RX ADMIN — IBUPROFEN 600 MG: 400 TABLET ORAL at 00:31

## 2022-04-29 ASSESSMENT — PAIN DESCRIPTION - LOCATION: LOCATION: ANKLE;FOOT

## 2022-04-29 ASSESSMENT — ENCOUNTER SYMPTOMS
SORE THROAT: 0
EYE PAIN: 0
BACK PAIN: 0
VOMITING: 0
NAUSEA: 0
SHORTNESS OF BREATH: 0
EYE DISCHARGE: 0
ABDOMINAL PAIN: 0
COUGH: 0
RHINORRHEA: 0

## 2022-04-29 ASSESSMENT — PAIN DESCRIPTION - ORIENTATION: ORIENTATION: LEFT

## 2022-04-29 ASSESSMENT — PAIN DESCRIPTION - FREQUENCY: FREQUENCY: INTERMITTENT

## 2022-04-29 ASSESSMENT — PAIN - FUNCTIONAL ASSESSMENT: PAIN_FUNCTIONAL_ASSESSMENT: 0-10

## 2022-04-29 ASSESSMENT — PAIN DESCRIPTION - PAIN TYPE: TYPE: ACUTE PAIN

## 2022-04-29 ASSESSMENT — PAIN SCALES - GENERAL: PAINLEVEL_OUTOF10: 8

## 2022-04-29 NOTE — ED PROVIDER NOTES
[address] [facility] [place]      Pt Name: Johnny Astudillo  MRN: [id number]  [place] 1970  Date of evaluation: 4/29/2022  Provider: Tamia Salcedo MD    CHIEF COMPLAINT       Chief Complaint   Patient presents with    Animal Bite         HISTORY OF PRESENT ILLNESS      Johnny Astudillo is a 46 y.o. male who presents to the emergency department  for   Chief Complaint   Patient presents with    Animal Bite       80-year-old male presents with dog bite to left foot. He was bit on the foot by a pit bull. He states it was not his dog. He is unsure when the dog's been fully vaccinated. He states that he last underwent tetanus vaccination 3 years ago. He presents to the emergency department the wound wrapped. Bleeding is controlled. Denies any numbness or tingling in the foot. Denies any other injuries. GCS of 15. He is moving all extremities spontaneously. Nursing Notes, Triage Notes & Vital Signs were reviewed. REVIEW OF SYSTEMS    (2-9 systems for level 4, 10 or more for level 5)     Review of Systems   Constitutional: Negative for chills and fever. HENT: Negative for congestion, rhinorrhea and sore throat. Eyes: Negative for pain and discharge. Respiratory: Negative for cough and shortness of breath. Cardiovascular: Negative for chest pain and palpitations. Gastrointestinal: Negative for abdominal pain, nausea and vomiting. Endocrine: Negative for polydipsia and polyuria. Genitourinary: Negative for dysuria and flank pain. Musculoskeletal: Negative for back pain and neck pain. Skin: Positive for wound. Negative for pallor. Neurological: Negative for dizziness, seizures, facial asymmetry, light-headedness, numbness and headaches. Psychiatric/Behavioral: Negative for confusion. Except as noted above the remainder of the review of systems was reviewed and negative.        PAST MEDICAL HISTORY     Past Medical History:   Diagnosis Date    Diabetes mellitus ([facility]     Hep C w/o coma, chronic ([facility]        Prior to Admission medications    Medication Sig Start Date End Date Taking? Authorizing Provider   amoxicillin-clavulanate (AUGMENTIN) 875-125 MG per tablet Take 1 tablet by mouth 2 times daily for 10 days 4/29/22 5/9/22 Yes Lisa Gunn MD   ibuprofen (ADVIL;MOTRIN) 600 MG tablet Take 1 tablet by mouth 3 times daily as needed for Pain 4/29/22  Yes Lisa Gunn MD   albuterol sulfate  (90 BASE) MCG/ACT inhaler Inhale 2 puffs into the lungs every 6 hours as needed for Wheezing    Historical Provider, MD        Patient Active Problem List   Diagnosis    Right shoulder pain    Other specified abnormal immunological findings in serum    HCV antibody positive    Nicotine dependence    Opioid dependence with withdrawal ([facility]    Opioid withdrawal ([facility]         SURGICAL HISTORY       Past Surgical History:   Procedure Laterality Date    CHOLECYSTECTOMY      ORTHOPEDIC SURGERY      SPLENECTOMY, TOTAL           CURRENT MEDICATIONS       Previous Medications    ALBUTEROL SULFATE  (90 BASE) MCG/ACT INHALER    Inhale 2 puffs into the lungs every 6 hours as needed for Wheezing       ALLERGIES     Vicodin [hydrocodone-acetaminophen]    FAMILY HISTORY     History reviewed. No pertinent family history. SOCIAL HISTORY       Social History     Socioeconomic History    Marital status:       Spouse name: None    Number of children: None    Years of education: None    Highest education level: None   Occupational History    None   Tobacco Use    Smoking status: Current Every Day Smoker     Packs/day: 1.00     Types: Cigarettes    Smokeless tobacco: Never Used   Vaping Use    Vaping Use: Never used   Substance and Sexual Activity    Alcohol use: No    Drug use: Yes     Comment: HERION    Sexual activity: Not Currently     Partners: Female   Other Topics Concern    None   Social History Narrative    None     Social Determinants of Health     Financial Resource Strain:     Difficulty of Paying Living Expenses: Not on file   Food Insecurity:     Worried About Running Out of Food in the Last Year: Not on file    Victor Manuel of Food in the Last Year: Not on file   Transportation Needs:     Lack of Transportation (Medical): Not on file    Lack of Transportation (Non-Medical): Not on file   Physical Activity:     Days of Exercise per Week: Not on file    Minutes of Exercise per Session: Not on file   Stress:     Feeling of Stress : Not on file   Social Connections:     Frequency of Communication with Friends and Family: Not on file    Frequency of Social Gatherings with Friends and Family: Not on file    Attends Gnosticist Services: Not on file    Active Member of [address] [place] [address] [employer] or Organizations: Not on file    Attends Club or Organization Meetings: Not on file    Marital Status: Not on file   Intimate Partner Violence:     Fear of Current or Ex-Partner: Not on file    Emotionally Abused: Not on file    Physically Abused: Not on file    Sexually Abused: Not on file   Housing Stability:     Unable to Pay for Housing in the Last Year: Not on file    Number of [place] in the Last Year: Not on file    Unstable Housing in the Last Year: Not on file       SCREENINGS               PHYSICAL EXAM    (up to 7 for level 4, 8 or more for level 5)     ED Triage Vitals [04/29/22 0012]   BP Temp Temp Source Pulse Resp SpO2 Height Weight   (!) 156/99 98.2 °F (36.8 °C) Oral 89 20 99 % 5' 11\" (1.803 m) 175 lb (79.4 kg)       Physical Exam  Vitals reviewed. HENT:      Head: Normocephalic and atraumatic. Nose: No congestion or rhinorrhea. Mouth/Throat:      Mouth: Mucous membranes are moist.      Pharynx: No oropharyngeal exudate or posterior oropharyngeal erythema. Eyes:      General:         Right eye: No discharge. Left eye: No discharge.       Extraocular Movements: Extraocular movements intact. Pupils: Pupils are equal, round, and reactive to light. Cardiovascular:      Rate and Rhythm: Normal rate. Pulses: Normal pulses. Heart sounds: No friction rub. No gallop. Pulmonary:      Effort: Pulmonary effort is normal. No respiratory distress. Chest:      Chest wall: No tenderness. Abdominal:      Palpations: Abdomen is soft. Tenderness: There is no abdominal tenderness. There is no guarding. Musculoskeletal:         General: No tenderness or deformity. Normal range of motion. Cervical back: Normal range of motion and neck supple. No tenderness. Lymphadenopathy:      Cervical: No cervical adenopathy. Skin:     General: Skin is warm. Capillary Refill: Capillary refill takes less than 2 seconds. Findings: Erythema present. Comments: Multiple lacerations to lateral side of left foot; full ROM of left foot; left lower leg neurovascularly intact with 2+ dp/pt pulses   Neurological:      General: No focal deficit present. Mental Status: He is alert and oriented to person, place, and time. DIAGNOSTIC RESULTS     Labs Reviewed - No data to display       RADIOLOGY:     Non-plain film images such as CT, Ultrasound and MRI are read by the radiologist. Plain radiographic images are visualized and preliminarily interpreted by the emergency physician. Interpretation per the Radiologist below, if available at the time of this note:    No orders to display         ED BEDSIDE ULTRASOUND:   Performed by ED Physician Annabel Juan MD       LABS:  Labs Reviewed - No data to display    All other labs were within normal range or not returned as of this dictation.     EMERGENCY DEPARTMENT COURSE and DIFFERENTIAL DIAGNOSIS/MDM:   Vitals:    Vitals:    04/29/22 0012   BP: (!) 156/99   Pulse: 89   Resp: 20   Temp: 98.2 °F (36.8 °C)   TempSrc: Oral   SpO2: 99%   Weight: 175 lb (79.4 kg)   Height: 5' 11\" (1.803 m)           MDM  Number of Diagnoses or Management Options  Dog bite, initial encounter  Laceration of left foot, initial encounter  Diagnosis management comments: 70-year-old male presents with dog bite to left foot. He was bit by a pit bull. Was on his dog. Unsure of the dog's vaccination status. His last tetanus shot was 3 years ago. He cannot ambulate and presents with bleeding controlled. He presents with elevated blood pressure. Vitals otherwise unremarkable. On exam he has multiple lacerations on the lateral left foot. Several lacerations were repaired with loose sutures. A total of 12 sutures were applied. Wound was cleansed beforehand. He was given anti-inflammatories. Is also given Augmentin. He is given wound care instructions. To follow-up outpatient with primary care and potentially wound clinic. He is prescribed antibiotics for home. He is discharged ambulatory in stable condition with return precautions. -  Patient seen and evaluated in the emergency department. -  Triage and nursing notes reviewed and incorporated. -  Old chart records reviewed and incorporated. -  Work-up included:  See above  -  Results discussed with patient. CONSULTS:  None    PROCEDURES:  None performed unless otherwise noted below     Lac Repair    Date/Time: 4/29/2022 1:51 AM  Performed by: Eliz Granda MD  Authorized by: Eliz Granda MD     Consent:     Consent obtained:  Verbal    Risks discussed:  Infection    Alternatives discussed:  Observation  Anesthesia (see MAR for exact dosages): Anesthesia method:  Local infiltration    Local anesthetic:  Lidocaine 1% w/o epi  Laceration details:     Location:  Foot    Foot location:  Top of L foot    Length (cm):  8  Repair type:     Repair type:   Intermediate  Exploration:     Hemostasis achieved with:  Direct pressure    Wound extent: no foreign bodies/material noted and no tendon damage noted      Contaminated: no    Treatment:     Area cleansed with: Hibiclens    Amount of cleaning:  Standard    Irrigation solution:  Sterile saline    Irrigation method:  Syringe  Skin repair:     Repair method:  Sutures    Suture size:  4-0    Suture material:  Prolene  Approximation:     Approximation:  Loose  Post-procedure details:     Dressing:  Antibiotic ointment and non-adherent dressing    Patient tolerance of procedure: Tolerated well, no immediate complications            FINAL IMPRESSION      1. Dog bite, initial encounter    2. Laceration of left foot, initial encounter          DISPOSITION/PLAN   DISPOSITION Decision To Discharge 04/29/2022 01:42:56 AM      PATIENT REFERRED TO:  [address] [place] [address]  [address] [place] [address]  [phone]  Schedule an appointment as soon as possible for a visit in 1 week      [facility] [phone] [id number] [phone]  Schedule an appointment as soon as possible for a visit in 1 week        DISCHARGE MEDICATIONS:  New Prescriptions    AMOXICILLIN-CLAVULANATE (AUGMENTIN) 875-125 MG PER TABLET    Take 1 tablet by mouth 2 times daily for 10 days    IBUPROFEN (ADVIL;MOTRIN) 600 MG TABLET    Take 1 tablet by mouth 3 times daily as needed for Pain       ED Provider Disposition Time  DISPOSITION Decision To Discharge 04/29/2022 01:42:56 AM      Appropriate personal protective equipment was worn during the patient's evaluation. These included surgical, eye protection, surgical mask or in 95 respirator and gloves. The patient was also placed in a surgical mask for the prevention of possible spread of respiratory viral illnesses. The Patient was instructed to read the package inserts with any medication that was prescribed. Major potential reactions and medication interactions were discussed. The Patient understands that there are numerous possible adverse reactions not covered.     The patient was also instructed to arrange follow-up with his or her primary care provider for review of any pending labwork or incidental findings on any radiology results that were obtained. All efforts were made to discuss any incidental findings that require further monitoring. Controlled Substances Monitoring:     No flowsheet data found.     (Please note that portions of this note were completed with a voice recognition program.  Efforts were made to edit the dictations but occasionally words are mis-transcribed.)    Danay Cannon MD (electronically signed)  Attending Emergency Physician           Danay Cannon MD  04/29/22 0396

## 2022-04-29 NOTE — ED TRIAGE NOTES
Pt reports unknown dog attacked him while he was walking down the street. Laceration and puncture wounds to left ankle and heel.  Bleeding controlled upon arrival.

## 2023-05-15 ENCOUNTER — HOSPITAL ENCOUNTER (EMERGENCY)
Age: 53
Discharge: HOME | End: 2023-05-15
Attending: EMERGENCY MEDICINE
Payer: MEDICAID

## 2023-05-15 VITALS
DIASTOLIC BLOOD PRESSURE: 78 MMHG | OXYGEN SATURATION: 98 % | SYSTOLIC BLOOD PRESSURE: 128 MMHG | RESPIRATION RATE: 20 BRPM | HEART RATE: 106 BPM | TEMPERATURE: 97.8 F

## 2023-05-15 DIAGNOSIS — R11.0 NAUSEA: Primary | ICD-10-CM

## 2023-05-15 LAB
ALBUMIN SERPL-MCNC: 4.4 GM/DL (ref 3.4–5)
ALP BLD-CCNC: 144 IU/L (ref 40–128)
ALT SERPL-CCNC: 15 U/L (ref 10–40)
ANION GAP SERPL CALCULATED.3IONS-SCNC: 10 MMOL/L (ref 4–16)
AST SERPL-CCNC: 21 IU/L (ref 15–37)
BASOPHILS ABSOLUTE: 0.1 K/CU MM
BASOPHILS RELATIVE PERCENT: 1 % (ref 0–1)
BILIRUB SERPL-MCNC: 0.3 MG/DL (ref 0–1)
BUN SERPL-MCNC: 19 MG/DL (ref 6–23)
CALCIUM SERPL-MCNC: 9.7 MG/DL (ref 8.3–10.6)
CHLORIDE BLD-SCNC: 101 MMOL/L (ref 99–110)
CO2: 27 MMOL/L (ref 21–32)
CREAT SERPL-MCNC: 0.9 MG/DL (ref 0.9–1.3)
DIFFERENTIAL TYPE: ABNORMAL
EOSINOPHILS ABSOLUTE: 0.4 K/CU MM
EOSINOPHILS RELATIVE PERCENT: 4 % (ref 0–3)
GFR SERPL CREATININE-BSD FRML MDRD: >60 ML/MIN/1.73M2
GLUCOSE SERPL-MCNC: 123 MG/DL (ref 70–99)
HCT VFR BLD CALC: 36.9 % (ref 42–52)
HEMOGLOBIN: 11.5 GM/DL (ref 13.5–18)
LYMPHOCYTES ABSOLUTE: 2.8 K/CU MM
LYMPHOCYTES RELATIVE PERCENT: 25 % (ref 24–44)
MCH RBC QN AUTO: 26.6 PG (ref 27–31)
MCHC RBC AUTO-ENTMCNC: 31.2 % (ref 32–36)
MCV RBC AUTO: 85.4 FL (ref 78–100)
MONOCYTES ABSOLUTE: 0.8 K/CU MM
MONOCYTES RELATIVE PERCENT: 7 % (ref 0–4)
PDW BLD-RTO: 14.8 % (ref 11.7–14.9)
PLATELET # BLD: 395 K/CU MM (ref 140–440)
PMV BLD AUTO: 10.1 FL (ref 7.5–11.1)
POTASSIUM SERPL-SCNC: 4 MMOL/L (ref 3.5–5.1)
RBC # BLD: 4.32 M/CU MM (ref 4.6–6.2)
SEGMENTED NEUTROPHILS ABSOLUTE COUNT: 7.1 K/CU MM
SEGMENTED NEUTROPHILS RELATIVE PERCENT: 63 % (ref 36–66)
SODIUM BLD-SCNC: 138 MMOL/L (ref 135–145)
TOTAL PROTEIN: 7.8 GM/DL (ref 6.4–8.2)
WBC # BLD: 11.2 K/CU MM (ref 4–10.5)

## 2023-05-15 PROCEDURE — 80053 COMPREHEN METABOLIC PANEL: CPT

## 2023-05-15 PROCEDURE — 99283 EMERGENCY DEPT VISIT LOW MDM: CPT

## 2023-05-15 PROCEDURE — 85007 BL SMEAR W/DIFF WBC COUNT: CPT

## 2023-05-15 PROCEDURE — 85027 COMPLETE CBC AUTOMATED: CPT

## 2023-05-15 RX ORDER — ONDANSETRON 4 MG/1
4 TABLET, ORALLY DISINTEGRATING ORAL 3 TIMES DAILY PRN
Qty: 21 TABLET | Refills: 0 | Status: SHIPPED | OUTPATIENT
Start: 2023-05-15

## 2023-05-15 RX ORDER — ONDANSETRON 4 MG/1
4 TABLET, ORALLY DISINTEGRATING ORAL ONCE
Status: DISCONTINUED | OUTPATIENT
Start: 2023-05-15 | End: 2023-05-15 | Stop reason: HOSPADM

## 2023-05-15 ASSESSMENT — ENCOUNTER SYMPTOMS
NAUSEA: 1
VOMITING: 1

## 2023-05-15 NOTE — ED PROVIDER NOTES
[address] ENCOUNTER      Pt Name: Janene Escobar  MRN: [id number]  [place] 1970  Date of evaluation: 5/15/2023  Provider: Martin King MD    CHIEF COMPLAINT       Chief Complaint   Patient presents with    Emesis    Diarrhea                HISTORY OF PRESENT ILLNESS      Janene Escobar is a 48 y.o. male who presents to the emergency department  for   Chief Complaint   Patient presents with    Emesis    Diarrhea              24-year-old male presents complaining of some nausea, vomiting and diarrhea. He states he had symptoms over the past day. Denies any significant abdominal pain. Denies any typical exposures. No recent biotics or travel. No food indiscretion. He is not have any respiratory symptoms. Denies any sick contacts. He is drinking chocolate milk in the emergency department and is not having any active vomiting. Nursing Notes, Triage Notes & Vital Signs were reviewed. REVIEW OF SYSTEMS    (2-9 systems for level 4, 10 or more for level 5)     Review of Systems   Gastrointestinal:  Positive for nausea and vomiting. Except as noted above the remainder of the review of systems was reviewed and negative. PAST MEDICAL HISTORY     Past Medical History:   Diagnosis Date    Diabetes mellitus ([facility]     Hep C w/o coma, chronic ([facility] [address]        Prior to Admission medications    Medication Sig Start Date End Date Taking?  Authorizing Provider   ondansetron (ZOFRAN-ODT) 4 MG disintegrating tablet Take 1 tablet by mouth 3 times daily as needed for Nausea or Vomiting 5/15/23  Yes Martin King MD   ibuprofen (ADVIL;MOTRIN) 600 MG tablet Take 1 tablet by mouth 3 times daily as needed for Pain 4/29/22   Martin King MD   albuterol sulfate  (90 BASE) MCG/ACT inhaler Inhale 2 puffs into the lungs every 6 hours as needed for Wheezing    Historical Provider, MD        Patient Active

## 2023-05-15 NOTE — DISCHARGE INSTRUCTIONS
You may find that consuming small, frequent mount of food and fluids will help your symptoms. If you develop any worsening or concerning symptoms, please seek immediate medical evaluation.

## 2023-06-19 ENCOUNTER — APPOINTMENT (OUTPATIENT)
Dept: GENERAL RADIOLOGY | Age: 53
End: 2023-06-19
Payer: MEDICAID

## 2023-06-19 ENCOUNTER — HOSPITAL ENCOUNTER (EMERGENCY)
Age: 53
Discharge: HOME | End: 2023-06-19
Payer: MEDICAID

## 2023-06-19 VITALS
BODY MASS INDEX: 23.8 KG/M2 | RESPIRATION RATE: 16 BRPM | WEIGHT: 170 LBS | DIASTOLIC BLOOD PRESSURE: 89 MMHG | TEMPERATURE: 98 F | OXYGEN SATURATION: 98 % | HEIGHT: 71 IN | HEART RATE: 78 BPM | SYSTOLIC BLOOD PRESSURE: 157 MMHG

## 2023-06-19 DIAGNOSIS — M79.89 SWELLING OF RIGHT HAND: ICD-10-CM

## 2023-06-19 DIAGNOSIS — M79.641 PAIN IN RIGHT HAND: ICD-10-CM
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
## 2023-06-19 DIAGNOSIS — S61.452A DOG BITE OF HAND, LEFT, INITIAL ENCOUNTER: Primary | ICD-10-CM

## 2023-06-19 DIAGNOSIS — S61.451A DOG BITE OF RIGHT HAND, INITIAL ENCOUNTER: ICD-10-CM
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
## 2023-06-19 PROCEDURE — 99283 EMERGENCY DEPT VISIT LOW MDM: CPT

## 2023-06-19 PROCEDURE — 6370000000 HC RX 637 (ALT 250 FOR IP): Performed by: PHYSICIAN ASSISTANT

## 2023-06-19 PROCEDURE — 73110 X-RAY EXAM OF WRIST: CPT

## 2023-06-19 PROCEDURE — 2500000003 HC RX 250 WO HCPCS: Performed by: PHYSICIAN ASSISTANT

## 2023-06-19 PROCEDURE — 73130 X-RAY EXAM OF HAND: CPT

## 2023-06-19 RX ORDER — BUPIVACAINE HYDROCHLORIDE 5 MG/ML
20 INJECTION, SOLUTION EPIDURAL; INTRACAUDAL ONCE
Status: COMPLETED | OUTPATIENT
Start: 2023-06-19 | End: 2023-06-19

## 2023-06-19 RX ORDER — AMOXICILLIN AND CLAVULANATE POTASSIUM 875; 125 MG/1; MG/1
1 TABLET, FILM COATED ORAL 2 TIMES DAILY
Qty: 14 TABLET | Refills: 0 | Status: SHIPPED | OUTPATIENT
Start: 2023-06-19 | End: 2023-06-26

## 2023-06-19 RX ORDER — OXYCODONE HYDROCHLORIDE AND ACETAMINOPHEN 5; 325 MG/1; MG/1
2 TABLET ORAL ONCE
Status: COMPLETED | OUTPATIENT
Start: 2023-06-19 | End: 2023-06-19

## 2023-06-19 RX ORDER — AMOXICILLIN AND CLAVULANATE POTASSIUM 875; 125 MG/1; MG/1
1 TABLET, FILM COATED ORAL ONCE
Status: COMPLETED | OUTPATIENT
Start: 2023-06-19 | End: 2023-06-19

## 2023-06-19 RX ADMIN — AMOXICILLIN AND CLAVULANATE POTASSIUM 1 TABLET: 875; 125 TABLET, FILM COATED ORAL at 15:31

## 2023-06-19 RX ADMIN — BUPIVACAINE HYDROCHLORIDE 100 MG: 5 INJECTION, SOLUTION EPIDURAL; INTRACAUDAL; PERINEURAL at 15:34

## 2023-06-19 RX ADMIN — OXYCODONE HYDROCHLORIDE AND ACETAMINOPHEN 2 TABLET: 5; 325 TABLET ORAL at 15:31

## 2023-06-19 ASSESSMENT — PAIN SCALES - GENERAL: PAINLEVEL_OUTOF10: 10

## 2023-06-19 ASSESSMENT — PAIN DESCRIPTION - LOCATION: LOCATION: HAND

## 2023-06-19 ASSESSMENT — PAIN DESCRIPTION - ORIENTATION: ORIENTATION: RIGHT

## 2023-06-19 NOTE — ED PROVIDER NOTES
sterile saline, povidone iodine cleansed as well. Patient given initial dose of prophylactics antibiotics. He tells me his tetanus is up-to-date. This was a stray dog although he does state that it looked well cared for and had a collar. I did have a discussion with patient regarding potential for rabies prophylaxis, and this was offered given that the dog was unknown and had no owner and that bite was unprovoked. Patient declined rabies prophylaxis. I did have a detailed discussion with patient regarding likelihood of infection and the need for close outpatient follow-up recommendations to follow-up with orthopedic hand provider, return to emergency department for worsening changing symptoms. Verbalized understanding is agreeable this plan. Condition is: mild stable acute illness    Appropriate for outpatient management     Patient was given the following medications:  Medications   oxyCODONE-acetaminophen (PERCOCET) 5-325 MG per tablet 2 tablet (2 tablets Oral Given 6/19/23 1531)   bupivacaine (PF) (MARCAINE) 0.5 % injection 100 mg (100 mg IntraDERmal Given 6/19/23 1534)   amoxicillin-clavulanate (AUGMENTIN) 875-125 MG per tablet 1 tablet (1 tablet Oral Given 6/19/23 1531)       Records Reviewed : PDMP- no active prescription for controlled medications, or concerning history for potential abuse    Disposition Considerations (tests considered but not done, Shared Decision Making, Pt Expectation of Test or Tx.):  Rabies prophylaxis offered, declined by patient     Disposition: Discussed need to follow up diagnostics, including incidental findings. Discharged with instructions to obtain outpatient follow up of patient's symptoms and findings, with strict return precautions if patient develops new or worsening symptoms. Follow-up plan and return precautions were provided and discussed in detail patient in agreement.       Chronic conditions affecting care:    has a past medical history of Diabetes mellitus

## 2023-06-19 NOTE — DISCHARGE INSTRUCTIONS
As we discussed, call your orthopedic hand surgeons office this week to schedule an appointment this week for reevaluation of your injuries, and for reevaluation of any persisting hand pain. Meanwhile take antibiotics as directed to help prevent infection. Apply ice and keep your hand elevated to help with pain and swelling. If needed use over-the-counter Tylenol ibuprofen to help with pain. Monitor daily for any signs of infection. Return to emergency department if you notice any drainage or discharge or pus, red streaks in the wound, worsening swelling to your hands or fingers, fevers, or any new concerns.

## 2024-06-01 ENCOUNTER — APPOINTMENT (OUTPATIENT)
Dept: CT IMAGING | Age: 54
DRG: 720 | End: 2024-06-01
Payer: MEDICAID

## 2024-06-01 ENCOUNTER — APPOINTMENT (OUTPATIENT)
Dept: ULTRASOUND IMAGING | Age: 54
DRG: 720 | End: 2024-06-01
Payer: MEDICAID

## 2024-06-01 ENCOUNTER — HOSPITAL ENCOUNTER (INPATIENT)
Age: 54
LOS: 3 days | Discharge: LEFT BEFORE DISCHARGE | DRG: 720 | End: 2024-06-04
Attending: EMERGENCY MEDICINE | Admitting: INTERNAL MEDICINE
Payer: MEDICAID

## 2024-06-01 ENCOUNTER — APPOINTMENT (OUTPATIENT)
Dept: GENERAL RADIOLOGY | Age: 54
DRG: 720 | End: 2024-06-01
Payer: MEDICAID

## 2024-06-01 DIAGNOSIS — J43.9 PULMONARY EMPHYSEMA, UNSPECIFIED EMPHYSEMA TYPE (HCC): ICD-10-CM

## 2024-06-01 DIAGNOSIS — F19.90 ACTIVE INTRAVENOUS DRUG USE: ICD-10-CM

## 2024-06-01 DIAGNOSIS — I95.9 HYPOTENSION, UNSPECIFIED HYPOTENSION TYPE: Primary | ICD-10-CM

## 2024-06-01 DIAGNOSIS — R65.20 SEVERE SEPSIS (HCC): ICD-10-CM

## 2024-06-01 DIAGNOSIS — T40.601A OPIATE OVERDOSE, ACCIDENTAL OR UNINTENTIONAL, INITIAL ENCOUNTER (HCC): ICD-10-CM

## 2024-06-01 DIAGNOSIS — A41.9 SEVERE SEPSIS (HCC): ICD-10-CM

## 2024-06-01 DIAGNOSIS — N17.9 AKI (ACUTE KIDNEY INJURY) (HCC): ICD-10-CM

## 2024-06-01 PROBLEM — M19.219 LOCALIZED, SECONDARY OSTEOARTHRITIS OF THE SHOULDER REGION: Status: ACTIVE | Noted: 2024-06-01

## 2024-06-01 PROBLEM — J44.9 CHRONIC OBSTRUCTIVE PULMONARY DISEASE (HCC): Status: ACTIVE | Noted: 2024-06-01

## 2024-06-01 PROBLEM — S72.302A CLOSED FRACTURE OF SHAFT OF LEFT FEMUR (HCC): Status: ACTIVE | Noted: 2022-02-07

## 2024-06-01 PROBLEM — R94.5 ABNORMAL RESULTS OF LIVER FUNCTION STUDIES: Status: ACTIVE | Noted: 2024-06-01
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
## 2024-06-01 PROBLEM — F15.10 AMPHETAMINE USE DISORDER, MILD (HCC): Chronic | Status: ACTIVE | Noted: 2020-08-30

## 2024-06-01 PROBLEM — E78.00 PURE HYPERCHOLESTEROLEMIA: Status: ACTIVE | Noted: 2024-06-01

## 2024-06-01 PROBLEM — Z90.81 S/P SPLENECTOMY: Status: ACTIVE | Noted: 2022-02-07

## 2024-06-01 PROBLEM — B19.20 HEPATITIS C VIRUS INFECTION: Status: ACTIVE | Noted: 2024-06-01

## 2024-06-01 PROBLEM — K66.1 HEMOPERITONEUM: Status: ACTIVE | Noted: 2024-06-01

## 2024-06-01 PROBLEM — F33.2 SEVERE RECURRENT MAJOR DEPRESSION WITHOUT PSYCHOTIC FEATURES (HCC): Status: ACTIVE | Noted: 2024-06-01

## 2024-06-01 PROBLEM — R73.03 PREDIABETES: Status: ACTIVE | Noted: 2024-06-01

## 2024-06-01 PROBLEM — S22.42XA CLOSED FRACTURE OF MULTIPLE RIBS OF LEFT SIDE: Status: ACTIVE | Noted: 2022-02-07

## 2024-06-01 LAB
ALBUMIN SERPL-MCNC: 3.7 GM/DL (ref 3.4–5)
ALP BLD-CCNC: 201 IU/L (ref 40–129)
ALT SERPL-CCNC: 65 U/L (ref 10–40)
AMPHETAMINES: NEGATIVE
ANION GAP SERPL CALCULATED.3IONS-SCNC: 14 MMOL/L (ref 7–16)
ANION GAP SERPL CALCULATED.3IONS-SCNC: 16 MMOL/L (ref 7–16)
AST SERPL-CCNC: 84 IU/L (ref 15–37)
BACTERIA: NEGATIVE /HPF
BANDED NEUTROPHILS ABSOLUTE COUNT: 7.33 K/CU MM
BANDED NEUTROPHILS RELATIVE PERCENT: 16 % (ref 5–11)
BARBITURATE SCREEN URINE: NEGATIVE
BASE EXCESS: 3 (ref 0–3)
BENZODIAZEPINE SCREEN, URINE: NEGATIVE
BILIRUB SERPL-MCNC: 0.4 MG/DL (ref 0–1)
BILIRUBIN, URINE: NEGATIVE MG/DL
BLOOD, URINE: ABNORMAL
BUN SERPL-MCNC: 32 MG/DL (ref 6–23)
BUN SERPL-MCNC: 33 MG/DL (ref 6–23)
BURR CELLS: ABNORMAL
CALCIUM SERPL-MCNC: 7.6 MG/DL (ref 8.3–10.6)
CALCIUM SERPL-MCNC: 8.6 MG/DL (ref 8.3–10.6)
CANNABINOID SCREEN URINE: NEGATIVE
CHLORIDE BLD-SCNC: 100 MMOL/L (ref 99–110)
CHLORIDE BLD-SCNC: 96 MMOL/L (ref 99–110)
CLARITY, UA: CLEAR
CO2: 21 MMOL/L (ref 21–32)
CO2: 22 MMOL/L (ref 21–32)
COCAINE METABOLITE: NEGATIVE
COLOR, UA: YELLOW
COMMENT: ABNORMAL
CREAT SERPL-MCNC: 3 MG/DL (ref 0.9–1.3)
CREAT SERPL-MCNC: 3.6 MG/DL (ref 0.9–1.3)
DIFFERENTIAL TYPE: ABNORMAL
FENTANYL URINE: ABNORMAL
GFR, ESTIMATED: 19 ML/MIN/1.73M2
GFR, ESTIMATED: 24 ML/MIN/1.73M2
GLUCOSE SERPL-MCNC: 151 MG/DL (ref 70–99)
GLUCOSE SERPL-MCNC: 96 MG/DL (ref 70–99)
GLUCOSE URINE: NEGATIVE MG/DL
HCO3 VENOUS: 23.7 MMOL/L (ref 22–29)
HCT VFR BLD CALC: 35 % (ref 42–52)
HEMOGLOBIN: 11.2 GM/DL (ref 13.5–18)
INR BLD: 1.2 INDEX
KETONES, URINE: NEGATIVE MG/DL
LACTIC ACID, SEPSIS: 2.9 MMOL/L (ref 0.4–2)
LACTIC ACID, SEPSIS: 3.8 MMOL/L (ref 0.4–2)
LACTIC ACID, SEPSIS: 5 MMOL/L (ref 0.4–2)
LEUKOCYTE ESTERASE, URINE: NEGATIVE
LYMPHOCYTES ABSOLUTE: 1.8 K/CU MM
LYMPHOCYTES RELATIVE PERCENT: 4 % (ref 24–44)
MCH RBC QN AUTO: 28.1 PG (ref 27–31)
MCHC RBC AUTO-ENTMCNC: 32 % (ref 32–36)
MCV RBC AUTO: 87.9 FL (ref 78–100)
METAMYELOCYTES ABSOLUTE COUNT: 0.92 K/CU MM
METAMYELOCYTES PERCENT: 2 %
MONOCYTES ABSOLUTE: 2.3 K/CU MM
MONOCYTES RELATIVE PERCENT: 5 % (ref 0–4)
NEUTROPHILS ABSOLUTE: 33.5 K/CU MM
NEUTROPHILS RELATIVE PERCENT: 73 % (ref 36–66)
NITRITE URINE, QUANTITATIVE: NEGATIVE
O2 SAT, VEN: 52.5 % (ref 50–70)
OPIATES, URINE: NEGATIVE
OXYCODONE: NEGATIVE
PCO2, VEN: 47 MMHG (ref 41–51)
PDW BLD-RTO: 14.9 % (ref 11.7–14.9)
PH VENOUS: 7.31 (ref 7.32–7.43)
PH, URINE: 7 (ref 5–8)
PLATELET # BLD: 255 K/CU MM (ref 140–440)
PLT MORPHOLOGY: ABNORMAL
PMV BLD AUTO: 12.4 FL (ref 7.5–11.1)
PO2, VEN: 30 MMHG (ref 28–48)
POTASSIUM SERPL-SCNC: 4.1 MMOL/L (ref 3.5–5.1)
POTASSIUM SERPL-SCNC: 4.1 MMOL/L (ref 3.5–5.1)
PROTEIN UA: 100 MG/DL
PROTHROMBIN TIME: 15.5 SECONDS (ref 11.7–14.5)
RBC # BLD: 3.98 M/CU MM (ref 4.6–6.2)
RBC URINE: 1 /HPF (ref 0–3)
SODIUM BLD-SCNC: 134 MMOL/L (ref 135–145)
SODIUM BLD-SCNC: 135 MMOL/L (ref 135–145)
SPECIFIC GRAVITY UA: 1.01 (ref 1–1.03)
TOTAL CK: 135 IU/L (ref 38–174)
TOTAL PROTEIN: 7 GM/DL (ref 6.4–8.2)
TOXIC GRANULATION: PRESENT
TRICHOMONAS: ABNORMAL /HPF
TROPONIN, HIGH SENSITIVITY: 11 NG/L (ref 0–22)
TROPONIN, HIGH SENSITIVITY: 9 NG/L (ref 0–22)
UROBILINOGEN, URINE: 1 MG/DL (ref 0.2–1)
WBC # BLD: 45.8 K/CU MM (ref 4–10.5)
WBC UA: 5 /HPF (ref 0–2)

## 2024-06-01 PROCEDURE — 74176 CT ABD & PELVIS W/O CONTRAST: CPT

## 2024-06-01 PROCEDURE — 83605 ASSAY OF LACTIC ACID: CPT

## 2024-06-01 PROCEDURE — 85007 BL SMEAR W/DIFF WBC COUNT: CPT

## 2024-06-01 PROCEDURE — 82800 BLOOD PH: CPT

## 2024-06-01 PROCEDURE — 85027 COMPLETE CBC AUTOMATED: CPT

## 2024-06-01 PROCEDURE — 81003 URINALYSIS AUTO W/O SCOPE: CPT

## 2024-06-01 PROCEDURE — 6360000004 HC RX CONTRAST MEDICATION: Performed by: EMERGENCY MEDICINE

## 2024-06-01 PROCEDURE — 84484 ASSAY OF TROPONIN QUANT: CPT

## 2024-06-01 PROCEDURE — 2060000000 HC ICU INTERMEDIATE R&B

## 2024-06-01 PROCEDURE — 87040 BLOOD CULTURE FOR BACTERIA: CPT

## 2024-06-01 PROCEDURE — 6360000002 HC RX W HCPCS: Performed by: EMERGENCY MEDICINE

## 2024-06-01 PROCEDURE — 2580000003 HC RX 258: Performed by: EMERGENCY MEDICINE

## 2024-06-01 PROCEDURE — 6370000000 HC RX 637 (ALT 250 FOR IP): Performed by: INTERNAL MEDICINE

## 2024-06-01 PROCEDURE — 70498 CT ANGIOGRAPHY NECK: CPT

## 2024-06-01 PROCEDURE — 80048 BASIC METABOLIC PNL TOTAL CA: CPT

## 2024-06-01 PROCEDURE — 82550 ASSAY OF CK (CPK): CPT

## 2024-06-01 PROCEDURE — 87086 URINE CULTURE/COLONY COUNT: CPT

## 2024-06-01 PROCEDURE — 82805 BLOOD GASES W/O2 SATURATION: CPT

## 2024-06-01 PROCEDURE — 85610 PROTHROMBIN TIME: CPT

## 2024-06-01 PROCEDURE — 94761 N-INVAS EAR/PLS OXIMETRY MLT: CPT

## 2024-06-01 PROCEDURE — 93005 ELECTROCARDIOGRAM TRACING: CPT | Performed by: EMERGENCY MEDICINE

## 2024-06-01 PROCEDURE — 6360000002 HC RX W HCPCS: Performed by: INTERNAL MEDICINE

## 2024-06-01 PROCEDURE — 96361 HYDRATE IV INFUSION ADD-ON: CPT

## 2024-06-01 PROCEDURE — 71250 CT THORAX DX C-: CPT

## 2024-06-01 PROCEDURE — 80053 COMPREHEN METABOLIC PANEL: CPT

## 2024-06-01 PROCEDURE — 83935 ASSAY OF URINE OSMOLALITY: CPT

## 2024-06-01 PROCEDURE — 99285 EMERGENCY DEPT VISIT HI MDM: CPT

## 2024-06-01 PROCEDURE — 80307 DRUG TEST PRSMV CHEM ANLYZR: CPT

## 2024-06-01 PROCEDURE — 84300 ASSAY OF URINE SODIUM: CPT

## 2024-06-01 PROCEDURE — 81001 URINALYSIS AUTO W/SCOPE: CPT

## 2024-06-01 PROCEDURE — 2580000003 HC RX 258: Performed by: INTERNAL MEDICINE

## 2024-06-01 PROCEDURE — 70450 CT HEAD/BRAIN W/O DYE: CPT

## 2024-06-01 PROCEDURE — 82570 ASSAY OF URINE CREATININE: CPT

## 2024-06-01 PROCEDURE — 96374 THER/PROPH/DIAG INJ IV PUSH: CPT

## 2024-06-01 PROCEDURE — 71045 X-RAY EXAM CHEST 1 VIEW: CPT

## 2024-06-01 PROCEDURE — 76775 US EXAM ABDO BACK WALL LIM: CPT

## 2024-06-01 RX ORDER — FAMOTIDINE 20 MG/1
10 TABLET, FILM COATED ORAL DAILY
Status: DISCONTINUED | OUTPATIENT
Start: 2024-06-01 | End: 2024-06-02

## 2024-06-01 RX ORDER — SODIUM CHLORIDE 0.9 % (FLUSH) 0.9 %
5-40 SYRINGE (ML) INJECTION EVERY 12 HOURS SCHEDULED
Status: DISCONTINUED | OUTPATIENT
Start: 2024-06-01 | End: 2024-06-04 | Stop reason: HOSPADM

## 2024-06-01 RX ORDER — SODIUM CHLORIDE 9 MG/ML
INJECTION, SOLUTION INTRAVENOUS CONTINUOUS
Status: DISPENSED | OUTPATIENT
Start: 2024-06-01 | End: 2024-06-03

## 2024-06-01 RX ORDER — SODIUM CHLORIDE 0.9 % (FLUSH) 0.9 %
5-40 SYRINGE (ML) INJECTION PRN
Status: DISCONTINUED | OUTPATIENT
Start: 2024-06-01 | End: 2024-06-04 | Stop reason: HOSPADM

## 2024-06-01 RX ORDER — ACETAMINOPHEN 650 MG/1
650 SUPPOSITORY RECTAL EVERY 6 HOURS PRN
Status: DISCONTINUED | OUTPATIENT
Start: 2024-06-01 | End: 2024-06-04 | Stop reason: HOSPADM

## 2024-06-01 RX ORDER — ATORVASTATIN CALCIUM 10 MG/1
10 TABLET, FILM COATED ORAL NIGHTLY
Status: DISCONTINUED | OUTPATIENT
Start: 2024-06-01 | End: 2024-06-04 | Stop reason: HOSPADM

## 2024-06-01 RX ORDER — 0.9 % SODIUM CHLORIDE 0.9 %
1000 INTRAVENOUS SOLUTION INTRAVENOUS ONCE
Status: COMPLETED | OUTPATIENT
Start: 2024-06-01 | End: 2024-06-01

## 2024-06-01 RX ORDER — ALBUTEROL SULFATE 90 UG/1
2 AEROSOL, METERED RESPIRATORY (INHALATION) EVERY 6 HOURS PRN
Status: DISCONTINUED | OUTPATIENT
Start: 2024-06-01 | End: 2024-06-04 | Stop reason: HOSPADM

## 2024-06-01 RX ORDER — BUPRENORPHINE 8 MG/1
4 TABLET SUBLINGUAL PRN
Status: DISCONTINUED | OUTPATIENT
Start: 2024-06-01 | End: 2024-06-04 | Stop reason: HOSPADM

## 2024-06-01 RX ORDER — ASPIRIN 81 MG/1
81 TABLET, CHEWABLE ORAL DAILY
Status: DISCONTINUED | OUTPATIENT
Start: 2024-06-01 | End: 2024-06-04 | Stop reason: HOSPADM

## 2024-06-01 RX ORDER — METHOCARBAMOL 500 MG/1
750 TABLET, FILM COATED ORAL EVERY 6 HOURS PRN
Status: DISCONTINUED | OUTPATIENT
Start: 2024-06-01 | End: 2024-06-04 | Stop reason: HOSPADM

## 2024-06-01 RX ORDER — SODIUM CHLORIDE 9 MG/ML
INJECTION, SOLUTION INTRAVENOUS PRN
Status: DISCONTINUED | OUTPATIENT
Start: 2024-06-01 | End: 2024-06-04 | Stop reason: HOSPADM

## 2024-06-01 RX ORDER — TRAZODONE HYDROCHLORIDE 50 MG/1
50 TABLET ORAL NIGHTLY PRN
Status: DISCONTINUED | OUTPATIENT
Start: 2024-06-01 | End: 2024-06-04 | Stop reason: HOSPADM

## 2024-06-01 RX ORDER — POLYETHYLENE GLYCOL 3350 17 G/17G
17 POWDER, FOR SOLUTION ORAL DAILY PRN
Status: DISCONTINUED | OUTPATIENT
Start: 2024-06-01 | End: 2024-06-04 | Stop reason: HOSPADM

## 2024-06-01 RX ORDER — NALOXONE HYDROCHLORIDE 1 MG/ML
1 INJECTION INTRAMUSCULAR; INTRAVENOUS; SUBCUTANEOUS PRN
Status: DISCONTINUED | OUTPATIENT
Start: 2024-06-01 | End: 2024-06-04 | Stop reason: HOSPADM

## 2024-06-01 RX ORDER — HYDROXYZINE PAMOATE 25 MG/1
50 CAPSULE ORAL EVERY 8 HOURS PRN
Status: DISCONTINUED | OUTPATIENT
Start: 2024-06-01 | End: 2024-06-04 | Stop reason: HOSPADM

## 2024-06-01 RX ORDER — ACETAMINOPHEN 325 MG/1
650 TABLET ORAL EVERY 6 HOURS PRN
Status: DISCONTINUED | OUTPATIENT
Start: 2024-06-01 | End: 2024-06-04 | Stop reason: HOSPADM

## 2024-06-01 RX ORDER — ASPIRIN 300 MG/1
300 SUPPOSITORY RECTAL DAILY
Status: DISCONTINUED | OUTPATIENT
Start: 2024-06-01 | End: 2024-06-04 | Stop reason: HOSPADM

## 2024-06-01 RX ORDER — GABAPENTIN 300 MG/1
300 CAPSULE ORAL EVERY 8 HOURS PRN
Status: DISCONTINUED | OUTPATIENT
Start: 2024-06-01 | End: 2024-06-04 | Stop reason: HOSPADM

## 2024-06-01 RX ORDER — SODIUM CHLORIDE 9 MG/ML
INJECTION, SOLUTION INTRAVENOUS CONTINUOUS
Status: DISCONTINUED | OUTPATIENT
Start: 2024-06-01 | End: 2024-06-04 | Stop reason: HOSPADM

## 2024-06-01 RX ORDER — ENOXAPARIN SODIUM 100 MG/ML
30 INJECTION SUBCUTANEOUS DAILY
Status: DISCONTINUED | OUTPATIENT
Start: 2024-06-02 | End: 2024-06-04 | Stop reason: HOSPADM

## 2024-06-01 RX ORDER — PROMETHAZINE HYDROCHLORIDE 25 MG/1
25 TABLET ORAL EVERY 6 HOURS PRN
Status: DISCONTINUED | OUTPATIENT
Start: 2024-06-01 | End: 2024-06-04 | Stop reason: HOSPADM

## 2024-06-01 RX ADMIN — NALOXONE HYDROCHLORIDE 1 MG: 1 INJECTION, SOLUTION INTRAMUSCULAR; INTRAVENOUS; SUBCUTANEOUS at 12:53

## 2024-06-01 RX ADMIN — VANCOMYCIN HYDROCHLORIDE 2000 MG: 5 INJECTION, POWDER, LYOPHILIZED, FOR SOLUTION INTRAVENOUS at 14:46

## 2024-06-01 RX ADMIN — SODIUM CHLORIDE 1000 ML: 9 INJECTION, SOLUTION INTRAVENOUS at 12:55

## 2024-06-01 RX ADMIN — SODIUM CHLORIDE 1000 ML: 9 INJECTION, SOLUTION INTRAVENOUS at 14:43

## 2024-06-01 RX ADMIN — CEFEPIME 1000 MG: 1 INJECTION, POWDER, FOR SOLUTION INTRAMUSCULAR; INTRAVENOUS at 17:28

## 2024-06-01 RX ADMIN — IOPAMIDOL 80 ML: 755 INJECTION, SOLUTION INTRAVENOUS at 12:33

## 2024-06-01 RX ADMIN — SODIUM CHLORIDE 1000 ML: 9 INJECTION, SOLUTION INTRAVENOUS at 12:56

## 2024-06-01 RX ADMIN — ASPIRIN 81 MG: 81 TABLET, CHEWABLE ORAL at 17:23

## 2024-06-01 RX ADMIN — ACETAMINOPHEN 650 MG: 325 TABLET ORAL at 21:25

## 2024-06-01 RX ADMIN — SODIUM CHLORIDE: 9 INJECTION, SOLUTION INTRAVENOUS at 13:20

## 2024-06-01 RX ADMIN — CEFEPIME 2000 MG: 2 INJECTION, POWDER, FOR SOLUTION INTRAVENOUS at 14:41

## 2024-06-01 ASSESSMENT — PAIN SCALES - GENERAL
PAINLEVEL_OUTOF10: 0
PAINLEVEL_OUTOF10: 5

## 2024-06-01 ASSESSMENT — LIFESTYLE VARIABLES
HOW MANY STANDARD DRINKS CONTAINING ALCOHOL DO YOU HAVE ON A TYPICAL DAY: PATIENT DOES NOT DRINK
HOW OFTEN DO YOU HAVE A DRINK CONTAINING ALCOHOL: NEVER

## 2024-06-01 ASSESSMENT — PAIN - FUNCTIONAL ASSESSMENT: PAIN_FUNCTIONAL_ASSESSMENT: 0-10

## 2024-06-01 ASSESSMENT — PAIN DESCRIPTION - LOCATION: LOCATION: HEAD

## 2024-06-01 ASSESSMENT — PAIN DESCRIPTION - DESCRIPTORS: DESCRIPTORS: ACHING

## 2024-06-01 NOTE — ED NOTES
During IV access pt noted to state they are an active heroin user at baseline. Last known inj. of heroin unknown

## 2024-06-01 NOTE — PROGRESS NOTES
4 Eyes Skin Assessment     NAME:  Dayo Ling  YOB: 1970  MEDICAL RECORD NUMBER:  [id number]    The patient is being assessed for  Admission    I agree that at least one RN has performed a thorough Head to Toe Skin Assessment on the patient. ALL assessment sites listed below have been assessed.      Areas assessed by both nurses:    Head, Face, Ears, Shoulders, Back, Chest, Arms, Elbows, Hands, Sacrum. Buttock, Coccyx, Ischium, and Legs. Feet and Heels        Does the Patient have a Wound? No noted wound(s)  scattered scars       Gadiel Prevention initiated by RN: Yes  Wound Care Orders initiated by RN: No    Pressure Injury (Stage 3,4, Unstageable, DTI, NWPT, and Complex wounds) if present, place Wound referral order by RN under : No    New Ostomies, if present place, Ostomy referral order under : No     Nurse 1 eSignature: Electronically signed by Josefina Lowery RN on 6/1/24 at 7:04 PM EDT    **SHARE this note so that the co-signing nurse can place an eSignature**    Nurse 2 eSignature: Electronically signed by Leyla Stover RN on 6/1/24 at 7:06 PM EDT

## 2024-06-01 NOTE — ED NOTES
ED TO INPATIENT SBAR HANDOFF    Patient Name: Dayo iLng   :  1970  54 y.o.   Preferred Name  Dayo  Family/Caregiver Present no   Restraints no   C-SSRS: Risk of Suicide: No Risk  Sitter no   Sepsis Risk Score Sepsis Risk Score: 2.17      Situation  Chief Complaint   Patient presents with    Blurred Vision     States started an hour ago    Extremity Weakness     LLE     Brief Description of Patient's Condition: Blurred vision, left lower extreme weakness  Mental Status: oriented and alert  Arrived from: home    Imaging:   XR CHEST PORTABLE   Final Result   Impression:   1. No acute cardiopulmonary disease.      Electronically signed by Bryant Leyva MD      CTA HEAD NECK W CONTRAST   Final Result      1. Patent carotid and vertebral arteries without stenosis or acute abnormality.      CTA HEAD:      FINDINGS:      ANTERIOR CIRCULATION: The intracranial internal carotid arteries, anterior cerebral arteries, and middle cerebral arteries demonstrate no occlusion or stenosis. No evidence for aneurysm or arteriovenous malformation. Hypoplastic A1 segment of the left    CHRIS with patent anterior communicating artery.      POSTERIOR CIRCULATION: The bilateral vertebral arteries, basilar artery and posterior cerebral arteries demonstrate no occlusion or stenosis. No evidence for aneurysm or arteriovenous malformation. Left posterior communicating artery is present. No right    posterior communicating artery.      INTRACRANIAL VENOUS SYSTEM: No evidence for intracranial venous thrombosis.      IMPRESSION:      1. Patent intracranial vessels.      Electronically signed by Bryant Leyva MD      CT HEAD WO CONTRAST   Final Result      1. No acute intracranial findings.      Electronically signed by Bryant Leyva MD      CT ABDOMEN PELVIS WO CONTRAST Additional Contrast? None    (Results Pending)   CT CHEST WO CONTRAST    (Results Pending)     Abnormal labs:   Abnormal Labs Reviewed   CBC WITH AUTO DIFFERENTIAL -  abnormality  Total: 0   Active LDA's:   Peripheral IV 06/01/24 Right Antecubital (Active)   Site Assessment Clean, dry & intact 06/01/24 1222   Line Status Blood return noted;Flushed;Normal saline locked 06/01/24 1222   Line Care Connections checked and tightened 06/01/24 1222   Phlebitis Assessment No symptoms 06/01/24 1222   Infiltration Assessment 0 06/01/24 1222   Dressing Status New dressing applied;Clean, dry & intact 06/01/24 1222   Dressing Type Transparent 06/01/24 1222   Dressing Intervention New 06/01/24 1222       Peripheral IV 06/01/24 Left Cephalic (Active)       Pertinent or High Risk Medications/Drips: no   If Yes, please provide details:   Blood Product Administration: no  If Yes, please provide details:     Recommendation    Incomplete orders Yes  Additional Comments: pt on room air, continent, chooses to be incontinent at this time, able to mobilize with one assist with wheelchair.   If any further questions, please call Sending RN at [phone].    Electronically signed by: Electronically signed by Ama Zaragoza RN on 6/1/2024 at 2:14 PM
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
## 2024-06-01 NOTE — DISCHARGE INSTR - LAB
Addiction Support and Treatment Options Near [facility] REACH  Outpatient treatment for both men & women  [address]   [facility] [address]  [phone]    [address] [facility] [address]  [phone]    [facility]    [address],   [facility] [address]  [phone]  Intensive Outpatient and Residential    [facility]        [address] [facility], [address]  [phone]    [facility]   (various other locations in [place]/ visit [email or web address])  [address] [address]  [phone]    [facility]   [address] [facility], ,  [facility], [id number] [phone]    [facility]   [address]  [place] [address]  [phone]      [facility]   [address] [place] [address]  [phone]    [facility]  [address] [place] [address]  [phone]    [facility]:   [address] [place] [address]  [phone]    [facility]  [address] [address] [place] [address]  [place] [address]  [phone]  [email or web address]    [address] [place], [place] [address] [phone]  Fax [phone]    [address] [facility] [address]  [place], [place] [address]  [phone]  Fax [phone]    [address] [place] [address]  [phone]  Fax [phone]     [place] [address]  Main number- [phone]  [phone]    [facility]  [address] [place] [address]   [place], [place] [address]  phone: [phone]

## 2024-06-01 NOTE — ED NOTES
Pt blood pressure soft attending aware, continue administering fluids and started ordered antibiotics.

## 2024-06-01 NOTE — ED NOTES
Noted pt blood pressure low, made  Aware, started second bag of fluids in rapid infuser, administered NARCAN.

## 2024-06-01 NOTE — ED TRIAGE NOTES
Patient to triage with c/o blurred vision and left lower extremity weakness that started approx. One hour ago. Also states his sensation on the left is decreased. Stroke alert initiated and patient taken to trauma 1. Resps even and unlabored.

## 2024-06-01 NOTE — CONSULTS
PHARMACY VANCOMYCIN MONITORING SERVICE  Pharmacy consulted by Dr. Osborne for monitoring and adjustment.    Indication for treatment: Vancomycin indication: Other: Sepsis of unknown etiology  Goal trough: Trough Goal: 15-20 mcg/mL  AUC/CASSANDRA: 400-600    Risk Factors for MRSA Identified:   Current or recent history of IVDA    Pertinent Laboratory Values:   Temp Readings from Last 3 Encounters:   06/01/24 98.4 °F (36.9 °C) (Oral)   06/19/23 98 °F (36.7 °C) (Oral)   05/15/23 97.8 °F (36.6 °C) (Oral)     Recent Labs     06/01/24  1221   WBC 45.8*     Recent Labs     06/01/24  1221   BUN 32*   CREATININE 3.6*     Estimated Creatinine Clearance: 25 mL/min (A) (based on SCr of 3.6 mg/dL (H)).  No intake or output data in the 24 hours ending 06/01/24 1608  Last Encounter Weight:  Wt Readings from Last 3 Encounters:   06/01/24 79.4 kg (175 lb)   06/19/23 77.1 kg (170 lb)   04/29/22 79.4 kg (175 lb)       Pertinent Cultures:   Date    Source    Results  6/1   Blood    In process  6/1   Urine                  Ordered    Vancomycin level:   TROUGH:  No results for input(s): \"VANCOTROUGH\" in the last 72 hours.  RANDOM:  No results for input(s): \"VANCORANDOM\" in the last 72 hours.    Assessment:  HPI: Patient presented with concern for blurred vision and left lower extremity weakness however on my evaluation he is hypotensive and I am more concerned that this is potentially drug overdose or infection related as he also complains of some chills.  Fluids were started  SCr, BUN, and urine output:   DANNIE, SCR very elevated @3.6 (baseline unknown)  BUN elevated @32  No UOP data  Day(s) of therapy: 1 of 7  Vancomycin concentration:   6/2 - random @06:00    Plan:  Intermittent vancomycin dosing based on levels due to DANNIE  The patient received vancomycin 2000mg ivpb x1 dose earlier today, no further doses at this time.  Check the vanco level tomorrow am.   Pharmacy will continue to monitor patient and adjust therapy as

## 2024-06-01 NOTE — ED NOTES
Pt stated \"he is not staying\", when questioned what's the reason pt stated \"what you meen why, I am an addict, I need heroin and fentonyl\", pt educated that

## 2024-06-02 LAB
ALBUMIN SERPL-MCNC: 3.6 GM/DL (ref 3.4–5)
ALP BLD-CCNC: 178 IU/L (ref 40–128)
ALT SERPL-CCNC: 46 U/L (ref 10–40)
ANION GAP SERPL CALCULATED.3IONS-SCNC: 14 MMOL/L (ref 7–16)
AST SERPL-CCNC: 40 IU/L (ref 15–37)
BANDED NEUTROPHILS ABSOLUTE COUNT: 8.49 K/CU MM
BANDED NEUTROPHILS RELATIVE PERCENT: 15 % (ref 5–11)
BILIRUB SERPL-MCNC: 0.6 MG/DL (ref 0–1)
BUN SERPL-MCNC: 27 MG/DL (ref 6–23)
BUN SERPL-MCNC: 29 MG/DL (ref 6–23)
BUN SERPL-MCNC: 31 MG/DL (ref 6–23)
CALCIUM SERPL-MCNC: 8.4 MG/DL (ref 8.3–10.6)
CALCIUM SERPL-MCNC: 8.6 MG/DL (ref 8.3–10.6)
CALCIUM SERPL-MCNC: 8.8 MG/DL (ref 8.3–10.6)
CHLORIDE BLD-SCNC: 100 MMOL/L (ref 99–110)
CHLORIDE BLD-SCNC: 102 MMOL/L (ref 99–110)
CHLORIDE BLD-SCNC: 102 MMOL/L (ref 99–110)
CHOLEST SERPL-MCNC: 88 MG/DL
CO2: 20 MMOL/L (ref 21–32)
CO2: 21 MMOL/L (ref 21–32)
CO2: 22 MMOL/L (ref 21–32)
CREAT SERPL-MCNC: 1.4 MG/DL (ref 0.9–1.3)
CREAT SERPL-MCNC: 1.6 MG/DL (ref 0.9–1.3)
CREAT SERPL-MCNC: 2 MG/DL (ref 0.9–1.3)
CREAT UR-MCNC: 46.4 MG/DL (ref 39–259)
DIFFERENTIAL TYPE: ABNORMAL
DOSE AMOUNT: NORMAL
DOSE TIME: NORMAL
ESTIMATED AVERAGE GLUCOSE: 131 MG/DL
GFR, ESTIMATED: 39 ML/MIN/1.73M2
GFR, ESTIMATED: 51 ML/MIN/1.73M2
GFR, ESTIMATED: 60 ML/MIN/1.73M2
GLUCOSE SERPL-MCNC: 122 MG/DL (ref 70–99)
GLUCOSE SERPL-MCNC: 128 MG/DL (ref 70–99)
GLUCOSE SERPL-MCNC: 84 MG/DL (ref 70–99)
HBA1C MFR BLD: 6.2 % (ref 4.2–6.3)
HCT VFR BLD CALC: 27.5 % (ref 42–52)
HDLC SERPL-MCNC: 14 MG/DL
HEMOGLOBIN: 9.2 GM/DL (ref 13.5–18)
LACTATE: 1.4 MMOL/L (ref 0.5–1.9)
LDLC SERPL CALC-MCNC: 47 MG/DL
LYMPHOCYTES ABSOLUTE: 2.8 K/CU MM
LYMPHOCYTES RELATIVE PERCENT: 5 % (ref 24–44)
MAGNESIUM: 1.7 MG/DL (ref 1.8–2.4)
MCH RBC QN AUTO: 27.5 PG (ref 27–31)
MCHC RBC AUTO-ENTMCNC: 33.5 % (ref 32–36)
MCV RBC AUTO: 82.3 FL (ref 78–100)
MONOCYTES ABSOLUTE: 2.8 K/CU MM
MONOCYTES RELATIVE PERCENT: 5 % (ref 0–4)
NEUTROPHILS ABSOLUTE: 42.5 K/CU MM
NEUTROPHILS RELATIVE PERCENT: 75 % (ref 36–66)
OSMOLALITY UR: 233 MOS/L (ref 292–1090)
PDW BLD-RTO: 14.8 % (ref 11.7–14.9)
PHOSPHORUS: 2.9 MG/DL (ref 2.5–4.9)
PLATELET # BLD: 269 K/CU MM (ref 140–440)
PMV BLD AUTO: 10.8 FL (ref 7.5–11.1)
POTASSIUM SERPL-SCNC: 3.6 MMOL/L (ref 3.5–5.1)
POTASSIUM SERPL-SCNC: 3.9 MMOL/L (ref 3.5–5.1)
POTASSIUM SERPL-SCNC: 4.1 MMOL/L (ref 3.5–5.1)
PROCALCITONIN SERPL-MCNC: >100 NG/ML
RBC # BLD: 3.34 M/CU MM (ref 4.6–6.2)
SODIUM BLD-SCNC: 135 MMOL/L (ref 135–145)
SODIUM BLD-SCNC: 136 MMOL/L (ref 135–145)
SODIUM BLD-SCNC: 138 MMOL/L (ref 135–145)
SODIUM URINE: 56 MMOL/L (ref 35–167)
TOTAL CK: 130 IU/L (ref 38–174)
TOTAL PROTEIN: 6.5 GM/DL (ref 6.4–8.2)
TRIGL SERPL-MCNC: 136 MG/DL
TSH SERPL DL<=0.005 MIU/L-ACNC: 0.42 UIU/ML (ref 0.27–4.2)
VANCOMYCIN RANDOM: 13.6 UG/ML
WBC # BLD: 56.6 K/CU MM (ref 4–10.5)

## 2024-06-02 PROCEDURE — 85007 BL SMEAR W/DIFF WBC COUNT: CPT

## 2024-06-02 PROCEDURE — 80061 LIPID PANEL: CPT

## 2024-06-02 PROCEDURE — 82550 ASSAY OF CK (CPK): CPT

## 2024-06-02 PROCEDURE — 84100 ASSAY OF PHOSPHORUS: CPT

## 2024-06-02 PROCEDURE — 6370000000 HC RX 637 (ALT 250 FOR IP): Performed by: INTERNAL MEDICINE

## 2024-06-02 PROCEDURE — 2709999900 HC NON-CHARGEABLE SUPPLY

## 2024-06-02 PROCEDURE — 80202 ASSAY OF VANCOMYCIN: CPT

## 2024-06-02 PROCEDURE — 6360000002 HC RX W HCPCS: Performed by: INTERNAL MEDICINE

## 2024-06-02 PROCEDURE — 76937 US GUIDE VASCULAR ACCESS: CPT

## 2024-06-02 PROCEDURE — 83735 ASSAY OF MAGNESIUM: CPT

## 2024-06-02 PROCEDURE — 05HA33Z INSERTION OF INFUSION DEVICE INTO LEFT BRACHIAL VEIN, PERCUTANEOUS APPROACH: ICD-10-PCS | Performed by: STUDENT IN AN ORGANIZED HEALTH CARE EDUCATION/TRAINING PROGRAM

## 2024-06-02 PROCEDURE — 84443 ASSAY THYROID STIM HORMONE: CPT

## 2024-06-02 PROCEDURE — 80048 BASIC METABOLIC PNL TOTAL CA: CPT

## 2024-06-02 PROCEDURE — C9113 INJ PANTOPRAZOLE SODIUM, VIA: HCPCS | Performed by: INTERNAL MEDICINE

## 2024-06-02 PROCEDURE — 80053 COMPREHEN METABOLIC PANEL: CPT

## 2024-06-02 PROCEDURE — 83036 HEMOGLOBIN GLYCOSYLATED A1C: CPT

## 2024-06-02 PROCEDURE — 94761 N-INVAS EAR/PLS OXIMETRY MLT: CPT

## 2024-06-02 PROCEDURE — 84145 PROCALCITONIN (PCT): CPT

## 2024-06-02 PROCEDURE — 99223 1ST HOSP IP/OBS HIGH 75: CPT | Performed by: STUDENT IN AN ORGANIZED HEALTH CARE EDUCATION/TRAINING PROGRAM

## 2024-06-02 PROCEDURE — 85027 COMPLETE CBC AUTOMATED: CPT

## 2024-06-02 PROCEDURE — 36415 COLL VENOUS BLD VENIPUNCTURE: CPT

## 2024-06-02 PROCEDURE — 87522 HEPATITIS C REVRS TRNSCRPJ: CPT

## 2024-06-02 PROCEDURE — 6360000002 HC RX W HCPCS: Performed by: FAMILY MEDICINE

## 2024-06-02 PROCEDURE — 36410 VNPNXR 3YR/> PHY/QHP DX/THER: CPT

## 2024-06-02 PROCEDURE — 2580000003 HC RX 258: Performed by: INTERNAL MEDICINE

## 2024-06-02 PROCEDURE — C1751 CATH, INF, PER/CENT/MIDLINE: HCPCS

## 2024-06-02 PROCEDURE — 83605 ASSAY OF LACTIC ACID: CPT

## 2024-06-02 PROCEDURE — 2060000000 HC ICU INTERMEDIATE R&B

## 2024-06-02 PROCEDURE — 85025 COMPLETE CBC W/AUTO DIFF WBC: CPT

## 2024-06-02 RX ORDER — ONDANSETRON 2 MG/ML
4 INJECTION INTRAMUSCULAR; INTRAVENOUS EVERY 6 HOURS PRN
Status: DISCONTINUED | OUTPATIENT
Start: 2024-06-02 | End: 2024-06-04 | Stop reason: HOSPADM

## 2024-06-02 RX ORDER — PROCHLORPERAZINE EDISYLATE 5 MG/ML
10 INJECTION INTRAMUSCULAR; INTRAVENOUS EVERY 6 HOURS PRN
Status: DISCONTINUED | OUTPATIENT
Start: 2024-06-02 | End: 2024-06-02

## 2024-06-02 RX ORDER — PROMETHAZINE HYDROCHLORIDE 25 MG/ML
25 INJECTION, SOLUTION INTRAMUSCULAR; INTRAVENOUS ONCE
Status: DISCONTINUED | OUTPATIENT
Start: 2024-06-02 | End: 2024-06-04 | Stop reason: HOSPADM

## 2024-06-02 RX ORDER — PROMETHAZINE HYDROCHLORIDE 25 MG/ML
25 INJECTION, SOLUTION INTRAMUSCULAR; INTRAVENOUS EVERY 6 HOURS PRN
Status: DISCONTINUED | OUTPATIENT
Start: 2024-06-02 | End: 2024-06-04 | Stop reason: HOSPADM

## 2024-06-02 RX ORDER — PROMETHAZINE HYDROCHLORIDE 25 MG/ML
25 INJECTION, SOLUTION INTRAMUSCULAR; INTRAVENOUS ONCE
Status: COMPLETED | OUTPATIENT
Start: 2024-06-02 | End: 2024-06-02

## 2024-06-02 RX ORDER — BUPRENORPHINE HYDROCHLORIDE AND NALOXONE HYDROCHLORIDE DIHYDRATE 8; 2 MG/1; MG/1
1 TABLET SUBLINGUAL EVERY 12 HOURS
Status: DISCONTINUED | OUTPATIENT
Start: 2024-06-02 | End: 2024-06-04 | Stop reason: HOSPADM

## 2024-06-02 RX ORDER — PANTOPRAZOLE SODIUM 40 MG/10ML
40 INJECTION, POWDER, LYOPHILIZED, FOR SOLUTION INTRAVENOUS DAILY
Status: DISCONTINUED | OUTPATIENT
Start: 2024-06-02 | End: 2024-06-04 | Stop reason: HOSPADM

## 2024-06-02 RX ADMIN — PROMETHAZINE HYDROCHLORIDE 25 MG: 25 TABLET ORAL at 02:58

## 2024-06-02 RX ADMIN — CEFEPIME 2000 MG: 2 INJECTION, POWDER, FOR SOLUTION INTRAVENOUS at 18:54

## 2024-06-02 RX ADMIN — ASPIRIN 81 MG: 81 TABLET, CHEWABLE ORAL at 10:06

## 2024-06-02 RX ADMIN — HYDROXYZINE PAMOATE 50 MG: 25 CAPSULE ORAL at 02:58

## 2024-06-02 RX ADMIN — BUPRENORPHINE 4 MG: 8 TABLET SUBLINGUAL at 12:53

## 2024-06-02 RX ADMIN — FAMOTIDINE 10 MG: 20 TABLET ORAL at 10:06

## 2024-06-02 RX ADMIN — BUPRENORPHINE 4 MG: 8 TABLET SUBLINGUAL at 06:46

## 2024-06-02 RX ADMIN — PANTOPRAZOLE SODIUM 40 MG: 40 INJECTION, POWDER, FOR SOLUTION INTRAVENOUS at 14:24

## 2024-06-02 RX ADMIN — ONDANSETRON 4 MG: 2 INJECTION INTRAMUSCULAR; INTRAVENOUS at 16:20

## 2024-06-02 RX ADMIN — SODIUM CHLORIDE 1500 MG: 9 INJECTION, SOLUTION INTRAVENOUS at 12:48

## 2024-06-02 RX ADMIN — SODIUM CHLORIDE: 9 INJECTION, SOLUTION INTRAVENOUS at 10:01

## 2024-06-02 RX ADMIN — SODIUM CHLORIDE: 9 INJECTION, SOLUTION INTRAVENOUS at 17:07

## 2024-06-02 RX ADMIN — PROCHLORPERAZINE EDISYLATE 10 MG: 5 INJECTION INTRAMUSCULAR; INTRAVENOUS at 10:08

## 2024-06-02 RX ADMIN — SODIUM CHLORIDE: 9 INJECTION, SOLUTION INTRAVENOUS at 00:20

## 2024-06-02 RX ADMIN — BUPRENORPHINE 4 MG: 8 TABLET SUBLINGUAL at 08:40

## 2024-06-02 RX ADMIN — BUPRENORPHINE 4 MG: 8 TABLET SUBLINGUAL at 10:50

## 2024-06-02 RX ADMIN — BUPRENORPHINE HYDROCHLORIDE AND NALOXONE HYDROCHLORIDE DIHYDRATE 1 TABLET: 8; 2 TABLET SUBLINGUAL at 16:20

## 2024-06-02 RX ADMIN — PROCHLORPERAZINE EDISYLATE 10 MG: 5 INJECTION INTRAMUSCULAR; INTRAVENOUS at 04:19

## 2024-06-02 RX ADMIN — CEFEPIME 1000 MG: 1 INJECTION, POWDER, FOR SOLUTION INTRAMUSCULAR; INTRAVENOUS at 06:49

## 2024-06-02 RX ADMIN — METHOCARBAMOL TABLETS 750 MG: 500 TABLET, COATED ORAL at 21:03

## 2024-06-02 RX ADMIN — PROMETHAZINE HYDROCHLORIDE 25 MG: 25 INJECTION INTRAMUSCULAR; INTRAVENOUS at 07:49

## 2024-06-02 RX ADMIN — BUPRENORPHINE 4 MG: 8 TABLET SUBLINGUAL at 21:04

## 2024-06-02 RX ADMIN — BUPRENORPHINE 4 MG: 8 TABLET SUBLINGUAL at 03:18

## 2024-06-02 ASSESSMENT — PAIN DESCRIPTION - LOCATION
LOCATION: GENERALIZED
LOCATION: HEAD
LOCATION: ABDOMEN
LOCATION: GENERALIZED

## 2024-06-02 ASSESSMENT — PAIN DESCRIPTION - DESCRIPTORS
DESCRIPTORS: ACHING
DESCRIPTORS: ACHING
DESCRIPTORS: ACHING;DISCOMFORT
DESCRIPTORS: ACHING
DESCRIPTORS: ACHING;DISCOMFORT
DESCRIPTORS: ACHING;DISCOMFORT

## 2024-06-02 ASSESSMENT — PAIN SCALES - GENERAL
PAINLEVEL_OUTOF10: 6
PAINLEVEL_OUTOF10: 6
PAINLEVEL_OUTOF10: 5
PAINLEVEL_OUTOF10: 4
PAINLEVEL_OUTOF10: 7
PAINLEVEL_OUTOF10: 6
PAINLEVEL_OUTOF10: 7
PAINLEVEL_OUTOF10: 5
PAINLEVEL_OUTOF10: 7

## 2024-06-02 NOTE — PROGRESS NOTES
Phillip administering IV prn nausea med patient screamed out \"I have to shit\" attempted to help patient get untanged and walk safely to the bathroom and patient held his arm up with a fist and said \"get out my way\". After patient back in bed and reconnected to monitor RN attempted to educate patient on safety and to not raise a fist to the nursing staff. Patient stated \"I never raised my fist to you get out of my room you psycho bitch.\" RN set bed alarm, made sure call light was in reach and left the room.

## 2024-06-02 NOTE — CONSULTS
RENAL DOSE ADJUSTMENT MADE PER P/T PROTOCOL    PREVIOUS ORDER:  Cefepime 1gm ivpb q12h (extended infusion)    INDICATION:  Sepsis of unknown etiology    Estimated Creatinine Clearance: 56 mL/min (A) (based on SCr of 1.6 mg/dL (H)).  Recent Labs     06/01/24  1221 06/01/24  1714 06/02/24  0327 06/02/24  1002   BUN 32* 33* 31* 29*   CREATININE 3.6* 3.0* 2.0* 1.6*     --  269  --    INR 1.2  --   --   --      NEW RENALLY ADJUSTED ORDER:  Cefepime 2gm ivpb q12h (extended infusion)    Damien Davis, [facility]  6/2/2024 1:42 PM

## 2024-06-02 NOTE — PROGRESS NOTES
CM attempted to meet with pt at bedside.  The pt was actively throwing up and requested CM to leave the room and return at a later time.      CM placed a list of addiction/recovery resources and PCP in the pt's discharge instructions.     CM to follow.      Yuly Guallpa, MSSW, LSW

## 2024-06-02 NOTE — DISCHARGE INSTRUCTIONS
GENERAL INFORMATION AND RESOURCES FOR ADDICTION AND RECOVERY    12-Step Education:  Please attend a 12-Step meeting each day (such as AA or NA).  We recommend you make it a goal to attend 90 meetings in 90 days.  There are online meetings and phone meetings available as well.    You can ask for a temporary sponsor at the first meeting.  Contact your sponsor daily so you can work the 12 Steps together.    Read AA's The Big Book. You can get a copy at any meeting.    Many 12-step programs have free apps to be downloaded onto your smartphone or tablet.    Exercise helps to decrease cravings, therefore exercise as tolerated.     Practice using “HALT\" by making sure you are not getting too Hungry, Angry, Lonely, or Tired.     Avoid people, places, and things that trigger you to use.    Helpful phone numbers/website:   Free Crisis Hotline: 8-966-087-TALK (1-867.889.2535)  24/7 crisis line for [facility]: 167.461.9897  24-hour crisis text hotline: Text the word \"4hope\" to 036-081 for crisis support. Texting this number is free if you have [employer], [employer], [employer] or [employer].  [place] of Access, Homeless Shelter Intake Hotline: 260.904.6926  [facility] Recovery www.smartrecRealConnex.com.org  [place] Quit Line (smoking) https://[place].quitlogix.org 800-QUIT-NOW (133-119-9301)  Alcoholics Anonymous/ CHER/ALATEEN 207-185-4621 or 121-133-2514  https://cogf.meetingfor.me/meetings or www.aacentral[place].org      Narcotics Anonymous 096-0575 or 962-553-4242   http://sascna.org/ or www.nacentral[place].org  Cocaine Anonymous 140-141-4638 www.ca[place].org   Marijuana Anonymous 276-186-7485 www.marijuana-anonymous.org   Mental Health Crisis Line: 990.380.1384  [place] [facility]: 412.143.4925  [employer]/minda   [place] Drug Information Treatment and Referral Hotline (073) 062.HELP  Information & Referral for [facility]  Referral line to connect with available resources/services  [facility] [phone] [place]  [facility] [phone]   [address] [facility]     2. Call for new patient Primary Care Physician appointment:   Physician Finder [phone]     Dr. Germain and Dr. Shirley [phone]   [address] [place], [place]     Thalia Tiwari [phone]   [address] [place], [place]     Dr. Posey and Love Enrique [phone]   [address] [facility] [place], [place]     Dr. Armendariz and Amanda Syed [phone]   [address] [place], [place]     Mago Catalan [phone]   [address] [place], [place]     Isamar Isabel CNP - [facility] [phone] [address] [place], [place] *Private Pay ONLY - Membership Program*     Dr. Reinoso, Bradley Mann PA and Hector Stack [phone]   [address] [facility], [address] [facility] (*Active Waiting List*) [phone]   [address] [place], [place]     Dr. Rodriguez [phone]   [address] [place], [place]     Power William [phone]   [address] [place], [place]   Dr. Che [phone]   [address] [facility], [address] [place], [place]     Dr. Germain and Dr. Bermeo [phone]   [address] [facility], [address] [place], [place]     Mandie Lott PA and Vaishnavi Arechiga -[phone]   [address] [facility], [address] [place], [place]     Mai Sher PA, Soren Esteban PA and Chanda Navarrete [phone]   [address] [place], [place]     Dr. Rodríguez and Cecile Diane -[phone] [address] [facility], [address] [place], [place]     Dr. Batista, Dr. Blackburn, Maritza Rice CNP, Janny Kehr CNP and Apple Nava CNP   [phone]   [address] [facility], [address] [place], [place]     Dr. Henson, Perla Avila CNP and Cecile Thakur [phone]   [address] [place] [address], [facility] [address] [place], [place]     Dr. Torres [phone]   [address] [place] [address] [address] [place], [place]

## 2024-06-02 NOTE — CONSULTS
PHARMACY VANCOMYCIN MONITORING SERVICE  Pharmacy consulted by Dr. Osborne for monitoring and adjustment.    Indication for treatment: Vancomycin indication: Other: Sepsis of unknown etiology  Goal trough: Trough Goal: 15-20 mcg/mL  AUC/CASSANDRA: 400-600    Risk Factors for MRSA Identified:   Current or recent history of IVDA    Pertinent Laboratory Values:   Temp Readings from Last 3 Encounters:   06/02/24 98.6 °F (37 °C) (Oral)   06/19/23 98 °F (36.7 °C) (Oral)   05/15/23 97.8 °F (36.6 °C) (Oral)     Recent Labs     06/01/24  1221 06/02/24  0327   WBC 45.8* 56.6*   LACTATE  --  1.4       Recent Labs     06/01/24  1714 06/02/24  0327 06/02/24  1002   BUN 33* 31* 29*   CREATININE 3.0* 2.0* 1.6*       Estimated Creatinine Clearance: 56 mL/min (A) (based on SCr of 1.6 mg/dL (H)).    Intake/Output Summary (Last 24 hours) at 6/2/2024 1122  Last data filed at 6/2/2024 1051  Gross per 24 hour   Intake 240 ml   Output 4725 ml   Net -4485 ml     Last Encounter Weight:  Wt Readings from Last 3 Encounters:   06/01/24 79.4 kg (175 lb)   06/19/23 77.1 kg (170 lb)   04/29/22 79.4 kg (175 lb)       Pertinent Cultures:   Date    Source    Results  6/1   Blood    In process  6/1   Urine                  In process    Vancomycin level:   TROUGH:  No results for input(s): \"VANCOTROUGH\" in the last 72 hours.  RANDOM:    Recent Labs     06/02/24  0327   VANCORANDOM 13.6       Assessment:  HPI: Patient presented with concern for blurred vision and left lower extremity weakness however on my evaluation he is hypotensive and I am more concerned that this is potentially drug overdose or infection related as he also complains of some chills.  Fluids were started  6/2 - WBC continues to trend upward from 45.8 yesterday to 56.6 today.  SCr, BUN, and urine output:   DANNIE, SCR improved from 3.6 yesterday down to 1.6 today (baseline unknown)  BUN trending down to 29  UOP good  Day(s) of therapy: 2 of 7  Vancomycin concentration:   6/2 - 13.6, 12½ hour  level post initial 2000mg ivpb dose, ok to re-dose    Plan:  DANNIE, SCR greatly improved from 3.6 yesterday down to 1.6 today.  Switch to a scheduled dose of vancomycin 1500mg ivpb q24h for a predicted AUC of 508 at steady state.  Recheck the vanco level in 2 days.  Pharmacy will continue to monitor patient and adjust therapy as indicated    VANCOMYCIN CONCENTRATION SCHEDULED FOR 6/4 @06:00    Thank you for the consult.  Damien Davis [facility]  6/2/2024 11:22 AM

## 2024-06-02 NOTE — H&P
This note has been deleted.  It was mislabeled as an H&P, it was meant to be a progress note.  The note has been recopied and labeled elsewhere as a progress note.

## 2024-06-02 NOTE — PROGRESS NOTES
Went into room to assess patient and give evening medications. Patient refused to answer any admission questions and said \"I don't even want to be here I hate hospitals and all these questions.\" Asked patient if he wanted to take his evening lipitor and that it was for high cholesterol and patient said \"I don't have high cholesterol I don't barely eat.\" Patient then stated \"I hate hospitals and I don't even want to be here I just came because I couldn't see and I told them I didn't want to stay. I'm an addict and you can't give me anything for it and I won't take suboxone or methadone and I won't even stay through the night.\" On call NP for hospitalist notified via secure chat. Patient then stated he had a headache and this nurse offered tylenol and patient agreeable to that but refuses to answer any other questions.

## 2024-06-02 NOTE — FLOWSHEET NOTE
06/02/24 0426   Psychosocial   Patient Behaviors (S)  Aggressive verbally, others;Angry;Defiant;Hostile;Non-compliant         While administering IV prn nausea med patient screamed out \"I have to shit\" attempted to help patient get untanged and walk safely to the bathroom and patient held his arm up with a fist and said \"get out my way\". After patient back in bed and reconnected to monitor RN attempted to educate patient on safety and to not raise a fist to the nursing staff. Patient stated \"I never raised my fist to you get out of my room you psycho bitch.\" RN set bed alarm, made sure call light was in reach and left the room.

## 2024-06-02 NOTE — PROGRESS NOTES
Occupational Therapy    Attempted to see pt this AM. Pt  behaviorally inappropriate fo therapy at this time. Will re-attempt at a later time    Vaishnavi Frost/L [id number]  9:38 AM,6/2/2024

## 2024-06-02 NOTE — CONSULTS
Consult completed.     Indication for line: Limited/no vessel suitable for conventional peripheral access  Medication/Anticipated Duration: 5 Days    Extremity prepped with orange CHG wipes.   BARD PowerGlide PRO Single Lumen Midline inserted into LUE basilic vein x 2 attempt using sterile ultrasound technique without difficulty per protocol. First attempt in FA- unable to advance catheter into vein after cannulation; pt states it feels like it is pulling. Brisk blood return noted and flushes without resistance. Labs collected and sent at this time. Patient tolerated procedure well. Ultrasound photos of vein diameter provided below. Primary nurse Hilda, notified and aware.    Consult the Vascular Access Team for questions, concerns, or change in patient's condition.

## 2024-06-02 NOTE — CONSULTS
TOTAL       Medications:  Scheduled Meds:   sodium chloride flush  5-40 mL IntraVENous 2 times per day    sodium chloride flush  5-40 mL IntraVENous 2 times per day    enoxaparin  30 mg SubCUTAneous Daily    atorvastatin  10 mg Oral Nightly    aspirin  81 mg Oral Daily    Or    aspirin  300 mg Rectal Daily    famotidine  10 mg Oral Daily    cefepime  1,000 mg IntraVENous Q12H    vancomycin (VANCOCIN) intermittent dosing (placeholder)   Other RX Placeholder     Continuous Infusions:   sodium chloride      sodium chloride 75 mL/hr at 06/01/24 1320    sodium chloride      sodium chloride 125 mL/hr at 06/02/24 0020     PRN Meds:.prochlorperazine, sodium chloride flush, sodium chloride, naloxone, sodium chloride flush, sodium chloride, acetaminophen **OR** acetaminophen, polyethylene glycol, albuterol sulfate HFA, buprenorphine, methocarbamol, gabapentin, hydrOXYzine pamoate, promethazine, traZODone    Allergies   Allergen Reactions    Vicodin [Hydrocodone-Acetaminophen] Other (See Comments)     RED, SWELLING , ITCHING     Social History     Socioeconomic History    Marital status:      Spouse name: Not on file    Number of children: Not on file    Years of education: Not on file    Highest education level: Not on file   Occupational History    Not on file   Tobacco Use    Smoking status: Every Day     Current packs/day: 1.00     Types: Cigarettes    Smokeless tobacco: Never   Vaping Use    Vaping Use: Never used   Substance and Sexual Activity    Alcohol use: No    Drug use: Yes     Types: IV, Opiates      Comment: HERION    Sexual activity: Not Currently     Partners: Female   Other Topics Concern    Not on file   Social History Narrative    Not on file     Social Determinants of Health     Financial Resource Strain: Not on file   Food Insecurity: No Food Insecurity (6/2/2024)    Hunger Vital Sign     Worried About Running Out of Food in the Last Year: Never true     Ran Out of Food in the Last Year: Never true    WBC 45.8*  --  56.6*   * 135 136   K 4.1 4.1 3.9   CL 96* 100 102   CO2 22 21 20*   BUN 32* 33* 31*   CREATININE 3.6* 3.0* 2.0*   GLUCOSE 151* 96 84   ALBUMIN 3.7  --  3.6   INR 1.2  --   --         Latest Reference Range & Units Most Recent   Cholesterol, Total <200 MG/DL 88  6/2/24 03:27   HDL Cholesterol >40 MG/DL 14 (L)  6/2/24 03:27   LDL Cholesterol <100 MG/DL 47  6/2/24 03:27   LDL, Direct <100 MG/ (H)  12/14/17 09:31   Triglycerides <150 MG/  6/2/24 03:27   (L): Data is abnormally low  (H): Data is abnormally high    IMAGING:    CT Head:    IMPRESSION:     1. No acute intracranial findings.        CTA head and neck:  IMPRESSION:     1. Patent intracranial vessels.    MRI Brain without contrast pending   Echocardiogram pending    Reviewed above images agree with above impressions      ASSESSMENT/PLAN:     54-year-old male with reported left visual field distortion and left lower extremity weakness secondary to acute right PCA infarct versus recrudescence of previous infarct versus showering of infarct versus TIA superimposed on acute fentanyl use, acute hypotension with history of type 2 diabetes and hepatitis C.  Patient is refusing majority of the exam and workup thus he has been informed he can leave AGAINST MEDICAL ADVICE otherwise plan of care as follows:  Neuro exam:  Nonfocal during my exam patient is extremely dirty unkept and indifferent about the exam  Neurodiagnostics:  CT head as above  CTA head and neck as above  MRI brain without contrast pending  Echocardiogram pending  Medications:  Aspirin 81 mg daily  Lipitor 40 mg daily  PT/OT/ST:  Per their recommendation  Follow-up:  Leukocytosis and elevated lactic defer to IM  Pending completion of neurodiagnostics        Thank you for allowing us to participate in the care of your patient.  If there are any questions regarding evaluation please feel free to contact us.     Isamar Isabel, FIFI - CNP, 6/2/2024      ------------------------------------    Attending Note:  I have rounded on this patient with Tricia Henriquez CNP. I have reviewed the chart and we have discussed this case in detail. The patient was seen and examined by myself. Pertinent labs and imaging have been personally reviewed.     Patient reports 5 to 6 hours of left leg weakness associated with vision loss to the left side concerning for right PCA versus multifocal stroke.  His symptoms resolved last night and today his exam is normal.  Discussed with him suspicion for stroke, versus less likely stress response.  Discussed indication for 81 mg daily aspirin and 40 mg daily Lipitor for secondary stroke prevention, and for MRI brain to confirm what happened.  He has been refusing scan today, but tells me he will undergo MRI brain.  We will wait and see and make further recommendations.  CT head and CTA head/neck reviewed and nonacute.  We will continue to follow.    Greater than 80 minutes total were spent on this patient's case including chart review, reviewing prior and recent imaging, obtaining history, examining the patient, and and documentation.    Bryanna Flores DO 6/2/2024 2:34 PM

## 2024-06-03 ENCOUNTER — APPOINTMENT (OUTPATIENT)
Dept: MRI IMAGING | Age: 54
DRG: 720 | End: 2024-06-03
Payer: MEDICAID

## 2024-06-03 ENCOUNTER — APPOINTMENT (OUTPATIENT)
Dept: NON INVASIVE DIAGNOSTICS | Age: 54
DRG: 720 | End: 2024-06-03
Attending: INTERNAL MEDICINE
Payer: MEDICAID

## 2024-06-03 PROBLEM — F11.90 OPIATE USE: Status: ACTIVE | Noted: 2024-06-03

## 2024-06-03 PROBLEM — I95.9 HYPOTENSION: Status: ACTIVE | Noted: 2024-06-03

## 2024-06-03 PROBLEM — F43.24 ADJUSTMENT DISORDER WITH DISTURBANCE OF CONDUCT: Status: ACTIVE | Noted: 2024-06-03

## 2024-06-03 LAB
ALBUMIN SERPL-MCNC: 3.4 GM/DL (ref 3.4–5)
ALP BLD-CCNC: 171 IU/L (ref 40–129)
ALT SERPL-CCNC: 35 U/L (ref 10–40)
ANION GAP SERPL CALCULATED.3IONS-SCNC: 12 MMOL/L (ref 7–16)
ANION GAP SERPL CALCULATED.3IONS-SCNC: 14 MMOL/L (ref 7–16)
AST SERPL-CCNC: 26 IU/L (ref 15–37)
BASOPHILS ABSOLUTE: 0.1 K/CU MM
BASOPHILS RELATIVE PERCENT: 0.2 % (ref 0–1)
BILIRUB SERPL-MCNC: 0.6 MG/DL (ref 0–1)
BUN SERPL-MCNC: 24 MG/DL (ref 6–23)
BUN SERPL-MCNC: 24 MG/DL (ref 6–23)
CALCIUM SERPL-MCNC: 7.4 MG/DL (ref 8.3–10.6)
CALCIUM SERPL-MCNC: 8.5 MG/DL (ref 8.3–10.6)
CHLORIDE BLD-SCNC: 106 MMOL/L (ref 99–110)
CHLORIDE BLD-SCNC: 106 MMOL/L (ref 99–110)
CO2: 21 MMOL/L (ref 21–32)
CO2: 21 MMOL/L (ref 21–32)
CREAT SERPL-MCNC: 1.1 MG/DL (ref 0.9–1.3)
CREAT SERPL-MCNC: 1.1 MG/DL (ref 0.9–1.3)
CULTURE: ABNORMAL
CULTURE: ABNORMAL
DIFFERENTIAL TYPE: ABNORMAL
ECHO AO ROOT DIAM: 3.8 CM
ECHO AO ROOT INDEX: 1.91 CM/M2
ECHO AV AREA PEAK VELOCITY: 3.5 CM2
ECHO AV AREA VTI: 3.3 CM2
ECHO AV AREA/BSA PEAK VELOCITY: 1.8 CM2/M2
ECHO AV AREA/BSA VTI: 1.7 CM2/M2
ECHO AV MEAN GRADIENT: 4 MMHG
ECHO AV MEAN VELOCITY: 1 M/S
ECHO AV PEAK GRADIENT: 8 MMHG
ECHO AV PEAK VELOCITY: 1.4 M/S
ECHO AV VELOCITY RATIO: 0.93
ECHO AV VTI: 32.5 CM
ECHO BSA: 1.99 M2
ECHO EST RA PRESSURE: 15 MMHG
ECHO IVC PROX: 3.2 CM
ECHO LA AREA 4C: 25.5 CM2
ECHO LA DIAMETER INDEX: 1.66 CM/M2
ECHO LA DIAMETER: 3.3 CM
ECHO LA MAJOR AXIS: 7.1 CM
ECHO LA TO AORTIC ROOT RATIO: 0.87
ECHO LA VOL MOD A4C: 79 ML (ref 18–58)
ECHO LA VOLUME INDEX MOD A4C: 40 ML/M2 (ref 16–34)
ECHO LV E' LATERAL VELOCITY: 15 CM/S
ECHO LV E' SEPTAL VELOCITY: 8 CM/S
ECHO LV EDV A4C: 133 ML
ECHO LV EDV INDEX A4C: 67 ML/M2
ECHO LV EJECTION FRACTION A4C: 56 %
ECHO LV ESV A4C: 59 ML
ECHO LV ESV INDEX A4C: 30 ML/M2
ECHO LV FRACTIONAL SHORTENING: 33 % (ref 28–44)
ECHO LV INTERNAL DIMENSION DIASTOLE INDEX: 2.71 CM/M2
ECHO LV INTERNAL DIMENSION DIASTOLIC: 5.4 CM (ref 4.2–5.9)
ECHO LV INTERNAL DIMENSION SYSTOLIC INDEX: 1.81 CM/M2
ECHO LV INTERNAL DIMENSION SYSTOLIC: 3.6 CM
ECHO LV IVSD: 0.9 CM (ref 0.6–1)
ECHO LV MASS 2D: 180.1 G (ref 88–224)
ECHO LV MASS INDEX 2D: 90.5 G/M2 (ref 49–115)
ECHO LV POSTERIOR WALL DIASTOLIC: 0.9 CM (ref 0.6–1)
ECHO LV RELATIVE WALL THICKNESS RATIO: 0.33
ECHO LVOT AREA: 3.8 CM2
ECHO LVOT AV VTI INDEX: 0.86
ECHO LVOT DIAM: 2.2 CM
ECHO LVOT MEAN GRADIENT: 3 MMHG
ECHO LVOT PEAK GRADIENT: 7 MMHG
ECHO LVOT PEAK VELOCITY: 1.3 M/S
ECHO LVOT STROKE VOLUME INDEX: 53.1 ML/M2
ECHO LVOT SV: 105.6 ML
ECHO LVOT VTI: 27.8 CM
ECHO MV A VELOCITY: 0.86 M/S
ECHO MV E DECELERATION TIME (DT): 222 MS
ECHO MV E VELOCITY: 1.32 M/S
ECHO MV E/A RATIO: 1.53
ECHO MV E/E' LATERAL: 8.8
ECHO MV E/E' RATIO (AVERAGED): 12.65
ECHO MV E/E' SEPTAL: 16.5
ECHO RIGHT VENTRICULAR SYSTOLIC PRESSURE (RVSP): 41 MMHG
ECHO RV MID DIMENSION: 3.1 CM
ECHO TV REGURGITANT MAX VELOCITY: 2.53 M/S
ECHO TV REGURGITANT PEAK GRADIENT: 26 MMHG
EKG ATRIAL RATE: 74 BPM
EKG ATRIAL RATE: 74 BPM
EKG DIAGNOSIS: NORMAL
EKG DIAGNOSIS: NORMAL
EKG P AXIS: 63 DEGREES
EKG P AXIS: 64 DEGREES
EKG P-R INTERVAL: 160 MS
EKG P-R INTERVAL: 160 MS
EKG Q-T INTERVAL: 380 MS
EKG Q-T INTERVAL: 384 MS
EKG QRS DURATION: 94 MS
EKG QRS DURATION: 94 MS
EKG QTC CALCULATION (BAZETT): 421 MS
EKG QTC CALCULATION (BAZETT): 426 MS
EKG R AXIS: 36 DEGREES
EKG R AXIS: 44 DEGREES
EKG T AXIS: 54 DEGREES
EKG T AXIS: 55 DEGREES
EKG VENTRICULAR RATE: 74 BPM
EKG VENTRICULAR RATE: 74 BPM
EOSINOPHILS ABSOLUTE: 0.1 K/CU MM
EOSINOPHILS RELATIVE PERCENT: 0.3 % (ref 0–3)
GFR, ESTIMATED: 80 ML/MIN/1.73M2
GFR, ESTIMATED: 80 ML/MIN/1.73M2
GLUCOSE SERPL-MCNC: 107 MG/DL (ref 70–99)
GLUCOSE SERPL-MCNC: 109 MG/DL (ref 70–99)
HCT VFR BLD CALC: 28.6 % (ref 42–52)
HEMOGLOBIN: 9.7 GM/DL (ref 13.5–18)
IMMATURE NEUTROPHIL %: 3.4 % (ref 0–0.43)
LYMPHOCYTES ABSOLUTE: 1.7 K/CU MM
LYMPHOCYTES RELATIVE PERCENT: 3.8 % (ref 24–44)
Lab: ABNORMAL
MCH RBC QN AUTO: 28 PG (ref 27–31)
MCHC RBC AUTO-ENTMCNC: 33.9 % (ref 32–36)
MCV RBC AUTO: 82.4 FL (ref 78–100)
MONOCYTES ABSOLUTE: 2 K/CU MM
MONOCYTES RELATIVE PERCENT: 4.5 % (ref 0–4)
NEUTROPHILS ABSOLUTE: 38.9 K/CU MM
NEUTROPHILS RELATIVE PERCENT: 87.8 % (ref 36–66)
NUCLEATED RBC %: 0 %
PDW BLD-RTO: 15 % (ref 11.7–14.9)
PLATELET # BLD: 263 K/CU MM (ref 140–440)
PMV BLD AUTO: 11.7 FL (ref 7.5–11.1)
POTASSIUM SERPL-SCNC: 3.8 MMOL/L (ref 3.5–5.1)
POTASSIUM SERPL-SCNC: 3.8 MMOL/L (ref 3.5–5.1)
RBC # BLD: 3.47 M/CU MM (ref 4.6–6.2)
SMEAR REVIEW: NORMAL
SODIUM BLD-SCNC: 139 MMOL/L (ref 135–145)
SODIUM BLD-SCNC: 141 MMOL/L (ref 135–145)
SPECIMEN: ABNORMAL
TOTAL IMMATURE NEUTOROPHIL: 1.5 K/CU MM
TOTAL NUCLEATED RBC: 0 K/CU MM
TOTAL PROTEIN: 6.3 GM/DL (ref 6.4–8.2)
WBC # BLD: 44.3 K/CU MM (ref 4–10.5)

## 2024-06-03 PROCEDURE — 85025 COMPLETE CBC W/AUTO DIFF WBC: CPT

## 2024-06-03 PROCEDURE — 70551 MRI BRAIN STEM W/O DYE: CPT

## 2024-06-03 PROCEDURE — 93010 ELECTROCARDIOGRAM REPORT: CPT | Performed by: INTERNAL MEDICINE

## 2024-06-03 PROCEDURE — 80048 BASIC METABOLIC PNL TOTAL CA: CPT

## 2024-06-03 PROCEDURE — 97161 PT EVAL LOW COMPLEX 20 MIN: CPT

## 2024-06-03 PROCEDURE — 2580000003 HC RX 258: Performed by: INTERNAL MEDICINE

## 2024-06-03 PROCEDURE — 2060000000 HC ICU INTERMEDIATE R&B

## 2024-06-03 PROCEDURE — 99221 1ST HOSP IP/OBS SF/LOW 40: CPT | Performed by: NURSE PRACTITIONER

## 2024-06-03 PROCEDURE — 05HA33Z INSERTION OF INFUSION DEVICE INTO LEFT BRACHIAL VEIN, PERCUTANEOUS APPROACH: ICD-10-PCS | Performed by: STUDENT IN AN ORGANIZED HEALTH CARE EDUCATION/TRAINING PROGRAM

## 2024-06-03 PROCEDURE — 6370000000 HC RX 637 (ALT 250 FOR IP): Performed by: INTERNAL MEDICINE

## 2024-06-03 PROCEDURE — 94761 N-INVAS EAR/PLS OXIMETRY MLT: CPT

## 2024-06-03 PROCEDURE — 80053 COMPREHEN METABOLIC PANEL: CPT

## 2024-06-03 PROCEDURE — C9113 INJ PANTOPRAZOLE SODIUM, VIA: HCPCS | Performed by: INTERNAL MEDICINE

## 2024-06-03 PROCEDURE — 93306 TTE W/DOPPLER COMPLETE: CPT | Performed by: INTERNAL MEDICINE

## 2024-06-03 PROCEDURE — 99233 SBSQ HOSP IP/OBS HIGH 50: CPT | Performed by: NURSE PRACTITIONER

## 2024-06-03 PROCEDURE — C1751 CATH, INF, PER/CENT/MIDLINE: HCPCS

## 2024-06-03 PROCEDURE — 76937 US GUIDE VASCULAR ACCESS: CPT

## 2024-06-03 PROCEDURE — 6360000002 HC RX W HCPCS: Performed by: INTERNAL MEDICINE

## 2024-06-03 PROCEDURE — 93306 TTE W/DOPPLER COMPLETE: CPT

## 2024-06-03 PROCEDURE — 36410 VNPNXR 3YR/> PHY/QHP DX/THER: CPT

## 2024-06-03 PROCEDURE — 97166 OT EVAL MOD COMPLEX 45 MIN: CPT

## 2024-06-03 RX ADMIN — BUPRENORPHINE 4 MG: 8 TABLET SUBLINGUAL at 18:41

## 2024-06-03 RX ADMIN — HYDROXYZINE PAMOATE 50 MG: 25 CAPSULE ORAL at 06:21

## 2024-06-03 RX ADMIN — ENOXAPARIN SODIUM 30 MG: 100 INJECTION SUBCUTANEOUS at 09:28

## 2024-06-03 RX ADMIN — CEFEPIME 2000 MG: 2 INJECTION, POWDER, FOR SOLUTION INTRAVENOUS at 14:53

## 2024-06-03 RX ADMIN — CEFEPIME 2000 MG: 2 INJECTION, POWDER, FOR SOLUTION INTRAVENOUS at 05:34

## 2024-06-03 RX ADMIN — BUPRENORPHINE HYDROCHLORIDE AND NALOXONE HYDROCHLORIDE DIHYDRATE 1 TABLET: 8; 2 TABLET SUBLINGUAL at 06:21

## 2024-06-03 RX ADMIN — SODIUM CHLORIDE: 9 INJECTION, SOLUTION INTRAVENOUS at 18:44

## 2024-06-03 RX ADMIN — BUPRENORPHINE HYDROCHLORIDE AND NALOXONE HYDROCHLORIDE DIHYDRATE 1 TABLET: 8; 2 TABLET SUBLINGUAL at 14:48

## 2024-06-03 RX ADMIN — CEFEPIME 2000 MG: 2 INJECTION, POWDER, FOR SOLUTION INTRAVENOUS at 22:47

## 2024-06-03 RX ADMIN — ONDANSETRON 4 MG: 2 INJECTION INTRAMUSCULAR; INTRAVENOUS at 06:21

## 2024-06-03 RX ADMIN — SODIUM CHLORIDE 1500 MG: 9 INJECTION, SOLUTION INTRAVENOUS at 11:32

## 2024-06-03 RX ADMIN — ATORVASTATIN CALCIUM 10 MG: 10 TABLET, FILM COATED ORAL at 20:34

## 2024-06-03 RX ADMIN — SODIUM CHLORIDE, PRESERVATIVE FREE 10 ML: 5 INJECTION INTRAVENOUS at 20:35

## 2024-06-03 RX ADMIN — PANTOPRAZOLE SODIUM 40 MG: 40 INJECTION, POWDER, FOR SOLUTION INTRAVENOUS at 09:28

## 2024-06-03 NOTE — CONSULTS
RENAL DOSE ADJUSTMENT MADE PER P/T PROTOCOL    PREVIOUS ORDER:  Cefepime 2gm ivpb q12h (extended infusion)    INDICATION:  Sepsis of unknown etiology    Estimated Creatinine Clearance: 82 mL/min (based on SCr of 1.1 mg/dL).  Recent Labs     06/01/24  1221 06/01/24  1714 06/02/24  1525 06/03/24  0030 06/03/24  0400   BUN 32*   < > 27* 24* 24*   CREATININE 3.6*   < > 1.4* 1.1 1.1      < >  --   --  263   INR 1.2  --   --   --   --     < > = values in this interval not displayed.       NEW RENALLY ADJUSTED ORDER:  Cefepime 2gm ivpb q8h (extended infusion)    Damien Davis [facility]  6/3/2024 1:42 PM

## 2024-06-03 NOTE — PROGRESS NOTES
Occupational Therapy    [facility] ACUTE CARE OCCUPATIONAL THERAPY EVALUATION    Dayo Ling, 1970, 2023/2023-A, 6/3/2024    Discharge Recommendation: Home with initial 24 hour supervision/assistance (pt would likely benefit from substance abuse rehab)    History:  [place]:  The primary encounter diagnosis was Hypotension, unspecified hypotension type. Diagnoses of Severe sepsis ([facility]), Active intravenous drug use, Opiate overdose, accidental or unintentional, initial encounter ([facility]), and DANNIE (acute kidney injury) ([facility]) were also pertinent to this visit.    Subjective:  Patient states: \"They keep me stuck in this bed!\"  Pain: Pt denied pain this date at rest  Communication with other providers: PT Dilcia, RN Kirk  Restrictions: General Precautions, Fall Risk, Telemetry, Pulse Ox, BP cuff, Bed/chair alarm    Home Setup/Prior level of function:  Social/Functional History  Lives With: Parent (Mother)  Type of Home: Mobile home  Home Layout: One level  Home Access: Ramped entrance  Bathroom Shower/Tub: Tub/Shower unit  Bathroom Toilet: Standard  ADL Assistance: Independent  Homemaking Assistance: Independent  Homemaking Responsibilities: Yes  Ambulation Assistance: Independent (uses no AD)  Transfer Assistance: Independent  Active : No (pt states he walks to work)  Occupation: Full time employment  Type of Occupation:     Examination:  Observation: Supine in bed upon arrival. Agreeable to evaluation.  Vision: WFL  Hearing: WFL  Vitals: Stable vitals throughout session on room air    Body Systems and functions:  ROM: WFL all joints in BL UEs  Strength: 4+ to 5/5 MMT elbow flexors, elbow extensors, and grasp in BL UEs  Sensation: WFL (denies numbness/tingling)  Tone: Normal  Coordination: WFL for ADLs  Perception: WNL    Activities of Daily Living (ADLs):  Feeding: Independent   Grooming: Supervision (able to complete in standing at sink)  UB bathing: Supervision  LB bathing: SBA (for safety

## 2024-06-03 NOTE — PROGRESS NOTES
Neurology Service Progress Note  [facility]   Patient Name: Dayo Ling  : 1970        Subjective:   Reason for consult: \"I did not have a stroke am not sure why I am here\"  Chart was reviewed in detail, patient was seen and assessed.  He is sitting up in chair at bedside today.  He is awake, alert and oriented.  Tells me that his left lower extremity weakness and left visual field changes have resolved.  He feels symptoms persisted for about 4 hours in total.  MRI brain was reviewed with the patient.  He understands that there is no evidence of acute stroke however there are nonspecific white matter changes on this imaging study secondary to remote trauma, microangiopathy or known substance use.  Patient tells me he has been using fentanyl and heroin.  While he is considered getting clean, he tells me that when he is not using it makes him age quicker.  For this reason he will likely not stop using per his report to me.      Past Medical History:   Diagnosis Date    Diabetes mellitus (HCC)     Hep C w/o coma, chronic (HCC)     :   Past Surgical History:   Procedure Laterality Date    CHOLECYSTECTOMY      ORTHOPEDIC SURGERY      SPLENECTOMY, TOTAL       Medications:  Scheduled Meds:   cefepime  2,000 mg IntraVENous Q8H    vancomycin  1,500 mg IntraVENous Q24H    buprenorphine-naloxone  1 tablet SubLINGual Q12H    pantoprazole  40 mg IntraVENous Daily    promethazine  25 mg IntraMUSCular Once    sodium chloride flush  5-40 mL IntraVENous 2 times per day    sodium chloride flush  5-40 mL IntraVENous 2 times per day    enoxaparin  30 mg SubCUTAneous Daily    atorvastatin  10 mg Oral Nightly    aspirin  81 mg Oral Daily    Or    aspirin  300 mg Rectal Daily     Continuous Infusions:   sodium chloride      sodium chloride 75 mL/hr at 24 1320    sodium chloride      sodium chloride Stopped (24 1400)     PRN Meds:.promethazine, ondansetron, sodium chloride flush, sodium

## 2024-06-03 NOTE — CONSULTS
Consult completed.     Indication for line: Limited/no vessel suitable for conventional peripheral access  Medication/Anticipated Duration: 5 Days    Pressure injectable BARD PowerGlide PRO  Single Lumen Midline inserted into LUE brachial vein x 1 attempt using sterile ultrasound technique without difficulty per protocol. Brisk blood return noted and flushes without resistance. Specimen collected and sent at this time. Patient tolerated procedure very well. Ultrasound photos of vein diameter provided below. Primary nurse BOLIVAR Marin notified and aware.    Consult the Vascular Access Team for questions, concerns, or change in patient's condition.

## 2024-06-03 NOTE — CONSULTS
Initial Psychiatric History and Physical    Dayo Ling  [id number]  6/1/2024 06/03/24    ID: Patient is a 54 yrs y.o. male    CC:agitation    HPI: Patient is a 54 year old male with pmhx of  DM 2, hepatitis C, Opiate use, nicotine dependence,  who presents to [facility] ed with concerns of blurred vision and ULE weakness and was admitted for severe sepsis, Opiate OD, and DANNIE. Patient responded to Narcan in ED. He has been agitated and raised fist at staff. Consults include neurology. Psychiatry consulted by Dr Osborne as \"made a threatening gesture- a fist at the nurse last night.\"      6/1  Hilda RN-\"I don't even want to be here I hate hospitals and all these questions.\" Asked patient if he wanted to take his evening lipitor and that it was for high cholesterol and patient said \"I don't have high cholesterol I don't barely eat.\" Patient then stated \"I hate hospitals and I don't even want to be here I just came because I couldn't see and I told them I didn't want to stay. I'm an addict and you can't give me anything for it and I won't take suboxone or methadone and I won't even stay through the night.\"     6/2 Hilda RN-While administering IV prn nausea med patient screamed out \"I have to shit\" attempted to help patient get untanged and walk safely to the bathroom and patient held his arm up with a fist and said \"get out my way\". After patient back in bed and reconnected to monitor RN attempted to educate patient on safety and to not raise a fist to the nursing staff. Patient stated \"I never raised my fist to you get out of my room you psycho bitch.\"     Spoke with staff, who noted patient is not happy getting upset and he was encouraged to be pleasant.    Met with patient at bedside. He is sitting on a chair, resting with head on right side. Called patient's name several times. He initially opened his eyes but refused to talk. Walked in room and called his name and noted several things. Patient declined to speak.He  adequate  Memory: Immediate, recent, and remote appear intact, though not formally tested.  Attention: mostly maintained throughout interview  Fund of knowledge: Average  Gait/Balance: SPEEDY     Impression:   Adjustment disorder  Opiate use dependence  DANNIE (acute kidney injury) (HCC)    Plan:  Patient is declining any assessment at this time; He was clearly awake and decided to not participate in assessment  Psychiatry will sign off  Ty for this consult  Would recommend patient abstain from opiates.  Recommend outpatient rehab- placed on dc tab   Labs and EKG reviewed  6/3 EKG qtc 421  6/3/24 Na+ 141, BUN 24, cr 1.1, alt 35, ast 26, WBC 44.3, hemoglobin 9.7, Platelets 263,   6/2 tsh 0.420, ck 130, mag 1.7,   6/1 Uds fentanyl + , UA neg for infection  PS to Dr Osborne regarding recommendations    Electronically signed by FIFI Cooley CNP on 6/3/2024 at 1:07 PM

## 2024-06-03 NOTE — CONSULTS
PHARMACY VANCOMYCIN MONITORING SERVICE  Pharmacy consulted by Dr. Osborne for monitoring and adjustment.    Indication for treatment: Vancomycin indication: Other: Sepsis of unknown etiology  Goal trough: Trough Goal: 15-20 mcg/mL  AUC/CASSANDRA: 400-600    Risk Factors for MRSA Identified:   Current or recent history of IVDA    Pertinent Laboratory Values:   Temp Readings from Last 3 Encounters:   06/03/24 98.6 °F (37 °C) (Oral)   06/19/23 98 °F (36.7 °C) (Oral)   05/15/23 97.8 °F (36.6 °C) (Oral)     Recent Labs     06/01/24  1221 06/02/24  0327 06/03/24  0400   WBC 45.8* 56.6* 44.3*   LACTATE  --  1.4  --        Recent Labs     06/02/24  1525 06/03/24  0030 06/03/24  0400   BUN 27* 24* 24*   CREATININE 1.4* 1.1 1.1       Estimated Creatinine Clearance: 82 mL/min (based on SCr of 1.1 mg/dL).    Intake/Output Summary (Last 24 hours) at 6/3/2024 1450  Last data filed at 6/3/2024 0900  Gross per 24 hour   Intake 2211.95 ml   Output 1250 ml   Net 961.95 ml       Last Encounter Weight:  Wt Readings from Last 3 Encounters:   06/01/24 79.4 kg (175 lb)   06/19/23 77.1 kg (170 lb)   04/29/22 79.4 kg (175 lb)       Pertinent Cultures:   Date    Source    Results  6/1   Blood    negative  6/1   Urine                  Gram- rods    Vancomycin level:   TROUGH:  No results for input(s): \"VANCOTROUGH\" in the last 72 hours.  RANDOM:    Recent Labs     06/02/24  0327   VANCORANDOM 13.6         Assessment:  HPI: Patient presented with concern for blurred vision and left lower extremity weakness however on my evaluation he is hypotensive and I am more concerned that this is potentially drug overdose or infection related as he also complains of some chills.  Fluids were started  6/2 - WBC continues to trend upward from 45.8 yesterday to 56.6 today.  SCr, BUN, and urine output:   DANNIE, SCR improved from 1.6 yesterday down to 1.1 today (baseline unknown)  BUN trending down to 24  UOP good  Day(s) of therapy: 3 of 7  Vancomycin concentration:    6/2 - 13.6, 12½ hour level post initial 2000mg ivpb dose, ok to re-dose    Plan:  DANNIE, SCR greatly improved from 3.6 on admission down to 1.1 today.  Increase to vancomycin 1500mg ivpb q18h for a predicted AUC of 491 at steady state.  Recheck the vanco trough level tomorrow am.  Pharmacy will continue to monitor patient and adjust therapy as indicated    VANCOMYCIN CONCENTRATION SCHEDULED FOR 6/4 @04:00    Thank you for the consult.  Damien Davis [facility]  6/3/2024 2:50 PM

## 2024-06-03 NOTE — CONSULTS
PHYSICAL THERAPY EVALUATION  [facility] ACUTE CARE   Dayo Ling, 1970, 2023/2023-A, 6/3/2024    Assessment:  Pt is a pleasant, 54 yom admitted d/t DANNIE d/t potential overdose. PMH significant for DM II, HEP C. After PT evaluation, pt is IND for all transfers and ambulation > 150' with no device, expect no issues with stairs. Pt feels confident in discharge home after acute care stay. Discharging pt from PT caseload.      Discharge recommendations: home with potential substance abuse rehab if pt agreeable     Equipment: none    Subjective: \"I don't know why I am here.\"    Pain: denies pain    Communication with other providers: Handoff from RN, co-eval with OT    Home Setup/Prior level of function  Social/Functional History  Lives With: Parent (Mother)  Type of Home: Mobile home  Home Layout: One level  Home Access: Ramped entrance  Bathroom Shower/Tub: Tub/Shower unit  Bathroom Toilet: Standard  ADL Assistance: Independent  Homemaking Assistance: Independent  Homemaking Responsibilities: Yes  Ambulation Assistance: Independent (uses no AD)  Transfer Assistance: Independent  Active : No (pt states he walks to work)  Occupation: Full time employment  Type of Occupation:     Examination of body systems (includes body structures/functions, activity/participation limitations):  Cognition: alert. oriented to name, place, time, condition.  Vision: WFL  Hearing: WFL  Cardiopulmonary: WFL  Telemetry: WNL    Musculoskeletal  ROM BLE: full  Strength RLE: grossly 5/5 MMT   Strength LLE: grossly 5/5 MMT  Neuro: Denies numbness/tingling. BLE dermatome testing WNL.  Proprioception: B great toe passive flex/ext with eyes closed 2/2    Mobility:  Rolling L/R: IND  Supine to sit: IND  Sitting balance: IND  Sit to stand: IND  Standing balance: IND  Transfer: IND  Gait: IND  Stairs: NT, expect IND  Object from floor: NT, expect IND    AMPAC 6 Clicks Inpatient Mobility:  AM-PAC Inpatient Mobility Raw

## 2024-06-03 NOTE — PROGRESS NOTES
V2.0    [facility] [place]      Name:  Dayo Ling /Age/Sex: 1970  (54 y.o. male)   MRN & CSN:  [id number] & [id number] Encounter Date/Time: 6/3/2024 10:58 AM EDT   Location:  -A PCP: No primary care provider on file.     Attending:Sunil Osborne MD       Hospital Day: 3    Assessment and Recommendations   Dayo Ling is a 54 y.o. male     Ivonne 3  -Received a message from cardiology-patient had evidence of endocarditis, I discussed this with him and his mother in the room and encouraged him to stay and complete treatment.  He is aware that he may need 6 weeks of IV antibiotics.  He did report that he usually walks out of the hospital prematurely, including when he had multiple trauma and a fractured femur.  -Encouraged him to stay  -Suboxone 8 mg twice daily ordered, and appears effective as he is much improved today  -The IV antibiotics are set to  after 7 days-will need to be renewed     Dayo Ling is a 54 y.o. male with a pmh of hepatitis C who presented to ED on  with blurred vision and left lower extremity weakness that started 1 hour earlier.  -When seen by the ED physician she felt that he may have overdosed.  He responded to Narcan.     Severe sepsis  -White count 45.8 in ED with 16% bands  -Blood cultures negative so far  -IV cefepime  -IV vancomycin    Mitral valve abnormality-with possible vegetation and possible endocarditis  -IV antibiotics  -ID consult     Hypotension  -Received 4 L of fluid in the ED, with good response     PWID -actively using heroin and fentanyl     Opiate overdose  -Responded to Narcan in ED     Acute kidney injury  -Creatinine 3.8 upon arrival to ED     History of hepatitis C  -Treated in the past, and patient thinks he has it again because he stated he is injecting again     Abnormal UA with 5 WBCs     Left lower extremity weakness upon admission  -Check MRI  -Start aspirin and Lipitor  -Stroke alert called in ED, and will follow  [facility]'s recommendations     Left upper lobe pulmonary nodule-3 mm-seen on CT chest done in ED  -Suggest 12-month follow-up     Tobacco use  -Does not desire a nicotine patch     Opiate use disorder, severe  -Reports buprenorphine does not help  -He reports that he does not want to be on methadone             Diet ADULT DIET; Regular   DVT Prophylaxis [x] Lovenox, []  Heparin, [] SCDs, [] Ambulation,  [] Eliquis, [] Xarelto  [] Coumadin   Code Status Full Code   Disposition From: Home  Expected Disposition: Home  Estimated Date of Discharge: Once workup and treatment plan for possible endocarditis determined  Patient requires continued admission due to suspected endocarditis   Surrogate Decision Maker/ POA Mother     Personally reviewed Lab Studies and Imaging     Drugs that require monitoring for toxicity include Lovenox and the method of monitoring was CBC        Subjective:     Chief Complaint:     Dayo Ling is a 54 y.o. male       Review of Systems:      Pertinent positives and negatives discussed in HPI    Objective:     Intake/Output Summary (Last 24 hours) at 6/3/2024 1058  Last data filed at 6/3/2024 0900  Gross per 24 hour   Intake 2271.95 ml   Output 1750 ml   Net 521.95 ml      Vitals:   Vitals:    06/02/24 2000 06/02/24 2134 06/03/24 0000 06/03/24 0400   BP: 132/78  128/80 (!) 152/97   Pulse: 64  64 60   Resp: 22 16 18 26   Temp: 98.8 °F (37.1 °C)  98.2 °F (36.8 °C) 98.6 °F (37 °C)   TempSrc: Oral  Oral Oral   SpO2: 97%  97% 96%   Weight:       Height:             Physical Exam:      Physical Exam  Cardiovascular:      Rate and Rhythm: Normal rate.   Pulmonary:      Effort: Pulmonary effort is normal. No respiratory distress.   Abdominal:      General: There is no distension.          Medications:   Medications:    vancomycin  1,500 mg IntraVENous Q24H    cefepime  2,000 mg IntraVENous Q12H    buprenorphine-naloxone  1 tablet SubLINGual Q12H    pantoprazole  40 mg IntraVENous Daily    promethazine  25  mg IntraMUSCular Once    sodium chloride flush  5-40 mL IntraVENous 2 times per day    sodium chloride flush  5-40 mL IntraVENous 2 times per day    enoxaparin  30 mg SubCUTAneous Daily    atorvastatin  10 mg Oral Nightly    aspirin  81 mg Oral Daily    Or    aspirin  300 mg Rectal Daily      Infusions:    sodium chloride      sodium chloride 75 mL/hr at 06/01/24 1320    sodium chloride      sodium chloride 125 mL/hr at 06/02/24 1856     PRN Meds: promethazine, 25 mg, Q6H PRN  ondansetron, 4 mg, Q6H PRN  sodium chloride flush, 5-40 mL, PRN  sodium chloride, , PRN  naloxone, 1 mg, PRN  sodium chloride flush, 5-40 mL, PRN  sodium chloride, , PRN  acetaminophen, 650 mg, Q6H PRN   Or  acetaminophen, 650 mg, Q6H PRN  polyethylene glycol, 17 g, Daily PRN  albuterol sulfate HFA, 2 puff, Q6H PRN  buprenorphine, 4 mg, PRN  methocarbamol, 750 mg, Q6H PRN  gabapentin, 300 mg, Q8H PRN  hydrOXYzine pamoate, 50 mg, Q8H PRN  promethazine, 25 mg, Q6H PRN  traZODone, 50 mg, Nightly PRN        Labs and Imaging   MRI brain without contrast    Result Date: 6/3/2024  STUDY: MRI brain without contrast. INDICATION: \"Stroke like symptoms\". History of diabetes. History of amphetamine use disorder. Remote splenectomy. Prediabetes. History of depression. History of COPD. TECHNIQUE: Routine multiplanar imaging sequences of the brain were obtained. No contrast agent was utilized. FINDINGS: Major intracranial flow voids are preserved. No abnormal flow void is seen. There is no evidence of cerebral hemorrhage or cerebral edema. There is no evidence of intra-axial or extra-axial mass. There is evidence of midline shift. Minimal number of scattered tiny abnormal bright T2 foci are noted bilaterally in the corona radiata no focus of diffusion restriction is noted. There is no mass or subdural hematoma. Pituitary gland and optic chiasm are unremarkable as visualized. Bony nasal septum is moderately deviated to the left. Tiny mucous retention cyst is

## 2024-06-04 ENCOUNTER — APPOINTMENT (OUTPATIENT)
Dept: GENERAL RADIOLOGY | Age: 54
DRG: 720 | End: 2024-06-04
Attending: STUDENT IN AN ORGANIZED HEALTH CARE EDUCATION/TRAINING PROGRAM
Payer: MEDICAID

## 2024-06-04 ENCOUNTER — APPOINTMENT (OUTPATIENT)
Dept: GENERAL RADIOLOGY | Age: 54
DRG: 720 | End: 2024-06-04
Payer: MEDICAID

## 2024-06-04 ENCOUNTER — APPOINTMENT (OUTPATIENT)
Dept: CT IMAGING | Age: 54
DRG: 720 | End: 2024-06-04
Payer: MEDICAID

## 2024-06-04 ENCOUNTER — HOSPITAL ENCOUNTER (INPATIENT)
Age: 54
LOS: 3 days | Discharge: LEFT BEFORE DISCHARGE | DRG: 720 | End: 2024-06-07
Attending: EMERGENCY MEDICINE | Admitting: INTERNAL MEDICINE
Payer: MEDICAID

## 2024-06-04 VITALS
OXYGEN SATURATION: 99 % | RESPIRATION RATE: 19 BRPM | HEART RATE: 72 BPM | BODY MASS INDEX: 24.5 KG/M2 | HEIGHT: 71 IN | WEIGHT: 175 LBS | SYSTOLIC BLOOD PRESSURE: 149 MMHG | TEMPERATURE: 98.8 F | DIASTOLIC BLOOD PRESSURE: 96 MMHG

## 2024-06-04 DIAGNOSIS — I05.9 ENDOCARDITIS OF MITRAL VALVE: ICD-10-CM

## 2024-06-04 DIAGNOSIS — N17.9 AKI (ACUTE KIDNEY INJURY) (HCC): ICD-10-CM

## 2024-06-04 DIAGNOSIS — R41.82 ALTERED MENTAL STATUS, UNSPECIFIED ALTERED MENTAL STATUS TYPE: Primary | ICD-10-CM

## 2024-06-04 DIAGNOSIS — R65.20 SEVERE SEPSIS (HCC): ICD-10-CM

## 2024-06-04 DIAGNOSIS — A41.9 SEVERE SEPSIS (HCC): ICD-10-CM

## 2024-06-04 DIAGNOSIS — E83.42 HYPOMAGNESEMIA: ICD-10-CM

## 2024-06-04 DIAGNOSIS — I33.0 ACUTE BACTERIAL ENDOCARDITIS: ICD-10-CM

## 2024-06-04 LAB
ALBUMIN SERPL-MCNC: 3.1 GM/DL (ref 3.4–5)
ALBUMIN SERPL-MCNC: 3.8 GM/DL (ref 3.4–5)
ALCOHOL SCREEN SERUM: <0.01 %WT/VOL
ALP BLD-CCNC: 165 IU/L (ref 40–128)
ALP BLD-CCNC: 579 IU/L (ref 40–129)
ALT SERPL-CCNC: 23 U/L (ref 10–40)
ALT SERPL-CCNC: 25 U/L (ref 10–40)
AMPHETAMINES: NEGATIVE
ANION GAP SERPL CALCULATED.3IONS-SCNC: 14 MMOL/L (ref 7–16)
ANION GAP SERPL CALCULATED.3IONS-SCNC: 21 MMOL/L (ref 7–16)
AST SERPL-CCNC: 15 IU/L (ref 15–37)
AST SERPL-CCNC: 28 IU/L (ref 15–37)
B PARAP IS1001 DNA NPH QL NAA+NON-PROBE: NOT DETECTED
B PERT.PT PRMT NPH QL NAA+NON-PROBE: NOT DETECTED
BACTERIA: ABNORMAL /HPF
BANDED NEUTROPHILS ABSOLUTE COUNT: 0.31 K/CU MM
BANDED NEUTROPHILS RELATIVE PERCENT: 5 % (ref 5–11)
BARBITURATE SCREEN URINE: NEGATIVE
BASOPHILS ABSOLUTE: 0.1 K/CU MM
BASOPHILS RELATIVE PERCENT: 0.6 % (ref 0–1)
BENZODIAZEPINE SCREEN, URINE: NEGATIVE
BILIRUB SERPL-MCNC: 0.5 MG/DL (ref 0–1)
BILIRUB SERPL-MCNC: 0.8 MG/DL (ref 0–1)
BILIRUBIN, URINE: NEGATIVE MG/DL
BLOOD, URINE: ABNORMAL
BUN SERPL-MCNC: 13 MG/DL (ref 6–23)
BUN SERPL-MCNC: 19 MG/DL (ref 6–23)
BURR CELLS: ABNORMAL
C PNEUM DNA NPH QL NAA+NON-PROBE: NOT DETECTED
CALCIUM SERPL-MCNC: 8.3 MG/DL (ref 8.3–10.6)
CALCIUM SERPL-MCNC: 9 MG/DL (ref 8.3–10.6)
CANNABINOID SCREEN URINE: NEGATIVE
CHLORIDE BLD-SCNC: 105 MMOL/L (ref 99–110)
CHLORIDE BLD-SCNC: 106 MMOL/L (ref 99–110)
CLARITY, UA: CLEAR
CO2: 14 MMOL/L (ref 21–32)
CO2: 21 MMOL/L (ref 21–32)
COCAINE METABOLITE: NEGATIVE
COLOR, UA: YELLOW
CREAT SERPL-MCNC: 1 MG/DL (ref 0.9–1.3)
CREAT SERPL-MCNC: 1.7 MG/DL (ref 0.9–1.3)
CRP SERPL HS-MCNC: 60.6 MG/L
DIFFERENTIAL TYPE: ABNORMAL
DIFFERENTIAL TYPE: ABNORMAL
EOSINOPHILS ABSOLUTE: 0.1 K/CU MM
EOSINOPHILS ABSOLUTE: 0.1 K/CU MM
EOSINOPHILS RELATIVE PERCENT: 0.8 % (ref 0–3)
EOSINOPHILS RELATIVE PERCENT: 1 % (ref 0–3)
FENTANYL URINE: ABNORMAL
FLUAV H1 2009 PAN RNA NPH NAA+NON-PROBE: NOT DETECTED
FLUAV H1 RNA NPH QL NAA+NON-PROBE: NOT DETECTED
FLUAV H3 RNA NPH QL NAA+NON-PROBE: NOT DETECTED
FLUAV RNA NPH QL NAA+NON-PROBE: NOT DETECTED
FLUBV RNA NPH QL NAA+NON-PROBE: NOT DETECTED
GFR, ESTIMATED: 47 ML/MIN/1.73M2
GFR, ESTIMATED: 89 ML/MIN/1.73M2
GLUCOSE SERPL-MCNC: 106 MG/DL (ref 70–99)
GLUCOSE SERPL-MCNC: 118 MG/DL (ref 70–99)
GLUCOSE URINE: NEGATIVE MG/DL
HADV DNA NPH QL NAA+NON-PROBE: NOT DETECTED
HCOV 229E RNA NPH QL NAA+NON-PROBE: NOT DETECTED
HCOV HKU1 RNA NPH QL NAA+NON-PROBE: NOT DETECTED
HCOV NL63 RNA NPH QL NAA+NON-PROBE: NOT DETECTED
HCOV OC43 RNA NPH QL NAA+NON-PROBE: NOT DETECTED
HCT VFR BLD CALC: 34.4 % (ref 42–52)
HCT VFR BLD CALC: 37 % (ref 42–52)
HCV RNA SERPL NAA+PROBE-ACNC: ABNORMAL IU/ML
HCV RNA SERPL NAA+PROBE-LOG IU: 3.4 LOG IU/ML
HCV RNA SERPL QL NAA+PROBE: DETECTED
HEMOGLOBIN: 11.2 GM/DL (ref 13.5–18)
HEMOGLOBIN: 12.1 GM/DL (ref 13.5–18)
HMPV RNA NPH QL NAA+NON-PROBE: NOT DETECTED
HPIV1 RNA NPH QL NAA+NON-PROBE: NOT DETECTED
HPIV2 RNA NPH QL NAA+NON-PROBE: NOT DETECTED
HPIV3 RNA NPH QL NAA+NON-PROBE: NOT DETECTED
HPIV4 RNA NPH QL NAA+NON-PROBE: NOT DETECTED
HYPOCHROMIA: ABNORMAL
IMMATURE NEUTROPHIL %: 0.7 % (ref 0–0.43)
KETONES, URINE: ABNORMAL MG/DL
LACTIC ACID, SEPSIS: 5.3 MMOL/L (ref 0.4–2)
LACTIC ACID, SEPSIS: 7.5 MMOL/L (ref 0.4–2)
LEUKOCYTE ESTERASE, URINE: NEGATIVE
LYMPHOCYTES ABSOLUTE: 0.9 K/CU MM
LYMPHOCYTES ABSOLUTE: 2.5 K/CU MM
LYMPHOCYTES RELATIVE PERCENT: 14 % (ref 24–44)
LYMPHOCYTES RELATIVE PERCENT: 16.3 % (ref 24–44)
M PNEUMO DNA NPH QL NAA+NON-PROBE: NOT DETECTED
MAGNESIUM: 0.9 MG/DL (ref 1.8–2.4)
MCH RBC QN AUTO: 27.2 PG (ref 27–31)
MCH RBC QN AUTO: 27.3 PG (ref 27–31)
MCHC RBC AUTO-ENTMCNC: 32.6 % (ref 32–36)
MCHC RBC AUTO-ENTMCNC: 32.7 % (ref 32–36)
MCV RBC AUTO: 83.3 FL (ref 78–100)
MCV RBC AUTO: 83.5 FL (ref 78–100)
MONOCYTES ABSOLUTE: 1.1 K/CU MM
MONOCYTES RELATIVE PERCENT: 7 % (ref 0–4)
MUCUS: ABNORMAL HPF
MYELOCYTE PERCENT: 1 %
MYELOCYTES ABSOLUTE COUNT: 0.06 K/CU MM
NEUTROPHILS ABSOLUTE: 11.3 K/CU MM
NEUTROPHILS ABSOLUTE: 4.8 K/CU MM
NEUTROPHILS RELATIVE PERCENT: 74.6 % (ref 36–66)
NEUTROPHILS RELATIVE PERCENT: 79 % (ref 36–66)
NITRITE URINE, QUANTITATIVE: NEGATIVE
NUCLEATED RBC %: 0 %
NUCLEATED RED BLOOD CELLS: 1
OPIATES, URINE: NEGATIVE
OXYCODONE: NEGATIVE
PDW BLD-RTO: 15 % (ref 11.7–14.9)
PDW BLD-RTO: 15.1 % (ref 11.7–14.9)
PH, URINE: 6 (ref 5–8)
PLATELET # BLD: 213 K/CU MM (ref 140–440)
PLATELET # BLD: 302 K/CU MM (ref 140–440)
PMV BLD AUTO: 11.3 FL (ref 7.5–11.1)
PMV BLD AUTO: 11.7 FL (ref 7.5–11.1)
POTASSIUM SERPL-SCNC: 3.8 MMOL/L (ref 3.5–5.1)
POTASSIUM SERPL-SCNC: 4.3 MMOL/L (ref 3.5–5.1)
PRO-BNP: ABNORMAL PG/ML
PROCALCITONIN SERPL-MCNC: 11.53 NG/ML
PROCALCITONIN SERPL-MCNC: 11.76 NG/ML
PROCALCITONIN SERPL-MCNC: 21.59 NG/ML
PROTEIN UA: 30 MG/DL
RBC # BLD: 4.12 M/CU MM (ref 4.6–6.2)
RBC # BLD: 4.44 M/CU MM (ref 4.6–6.2)
RBC # BLD: ABNORMAL 10*6/UL
RBC URINE: 18 /HPF (ref 0–3)
RSV RNA NPH QL NAA+NON-PROBE: NOT DETECTED
RV+EV RNA NPH QL NAA+NON-PROBE: NOT DETECTED
SARS-COV-2 RNA NPH QL NAA+NON-PROBE: NOT DETECTED
SODIUM BLD-SCNC: 140 MMOL/L (ref 135–145)
SODIUM BLD-SCNC: 141 MMOL/L (ref 135–145)
SPECIFIC GRAVITY UA: 1.02 (ref 1–1.03)
T4 FREE SERPL-MCNC: 1.29 NG/DL (ref 0.9–1.8)
TEAR DROP CELLS: ABNORMAL
TOTAL IMMATURE NEUTOROPHIL: 0.11 K/CU MM
TOTAL NUCLEATED RBC: 0 K/CU MM
TOTAL PROTEIN: 6.4 GM/DL (ref 6.4–8.2)
TOTAL PROTEIN: 7.1 GM/DL (ref 6.4–8.2)
TOTAL RETICULOCYTE COUNT: 0.05 K/CU MM
TRANSITIONAL EPITHELIAL: 1 /HPF
TRICHOMONAS: ABNORMAL /HPF
TROPONIN, HIGH SENSITIVITY: 37 NG/L (ref 0–22)
TROPONIN, HIGH SENSITIVITY: 67 NG/L (ref 0–22)
TSH SERPL DL<=0.005 MIU/L-ACNC: 5.8 UIU/ML (ref 0.27–4.2)
UROBILINOGEN, URINE: 0.2 MG/DL (ref 0.2–1)
WBC # BLD: 15.2 K/CU MM (ref 4–10.5)
WBC # BLD: 6.2 K/CU MM (ref 4–10.5)
WBC UA: 2 /HPF (ref 0–2)

## 2024-06-04 PROCEDURE — 6370000000 HC RX 637 (ALT 250 FOR IP): Performed by: INTERNAL MEDICINE

## 2024-06-04 PROCEDURE — 2580000003 HC RX 258: Performed by: INTERNAL MEDICINE

## 2024-06-04 PROCEDURE — 84145 PROCALCITONIN (PCT): CPT

## 2024-06-04 PROCEDURE — 93005 ELECTROCARDIOGRAM TRACING: CPT | Performed by: STUDENT IN AN ORGANIZED HEALTH CARE EDUCATION/TRAINING PROGRAM

## 2024-06-04 PROCEDURE — 85007 BL SMEAR W/DIFF WBC COUNT: CPT

## 2024-06-04 PROCEDURE — 80307 DRUG TEST PRSMV CHEM ANLYZR: CPT

## 2024-06-04 PROCEDURE — 84439 ASSAY OF FREE THYROXINE: CPT

## 2024-06-04 PROCEDURE — 0202U NFCT DS 22 TRGT SARS-COV-2: CPT

## 2024-06-04 PROCEDURE — 87150 DNA/RNA AMPLIFIED PROBE: CPT

## 2024-06-04 PROCEDURE — 80053 COMPREHEN METABOLIC PANEL: CPT

## 2024-06-04 PROCEDURE — 72125 CT NECK SPINE W/O DYE: CPT

## 2024-06-04 PROCEDURE — 6360000002 HC RX W HCPCS: Performed by: INTERNAL MEDICINE

## 2024-06-04 PROCEDURE — 96361 HYDRATE IV INFUSION ADD-ON: CPT

## 2024-06-04 PROCEDURE — 96375 TX/PRO/DX INJ NEW DRUG ADDON: CPT

## 2024-06-04 PROCEDURE — 85025 COMPLETE CBC W/AUTO DIFF WBC: CPT

## 2024-06-04 PROCEDURE — 87106 FUNGI IDENTIFICATION YEAST: CPT

## 2024-06-04 PROCEDURE — 99285 EMERGENCY DEPT VISIT HI MDM: CPT

## 2024-06-04 PROCEDURE — 99255 IP/OBS CONSLTJ NEW/EST HI 80: CPT | Performed by: INTERNAL MEDICINE

## 2024-06-04 PROCEDURE — 84443 ASSAY THYROID STIM HORMONE: CPT

## 2024-06-04 PROCEDURE — APPSS60 APP SPLIT SHARED TIME 46-60 MINUTES: Performed by: NURSE PRACTITIONER

## 2024-06-04 PROCEDURE — 70450 CT HEAD/BRAIN W/O DYE: CPT

## 2024-06-04 PROCEDURE — 84484 ASSAY OF TROPONIN QUANT: CPT

## 2024-06-04 PROCEDURE — 85027 COMPLETE CBC AUTOMATED: CPT

## 2024-06-04 PROCEDURE — C9113 INJ PANTOPRAZOLE SODIUM, VIA: HCPCS | Performed by: INTERNAL MEDICINE

## 2024-06-04 PROCEDURE — 83735 ASSAY OF MAGNESIUM: CPT

## 2024-06-04 PROCEDURE — 6360000002 HC RX W HCPCS: Performed by: STUDENT IN AN ORGANIZED HEALTH CARE EDUCATION/TRAINING PROGRAM

## 2024-06-04 PROCEDURE — 2140000000 HC CCU INTERMEDIATE R&B

## 2024-06-04 PROCEDURE — 96374 THER/PROPH/DIAG INJ IV PUSH: CPT

## 2024-06-04 PROCEDURE — 83880 ASSAY OF NATRIURETIC PEPTIDE: CPT

## 2024-06-04 PROCEDURE — 71045 X-RAY EXAM CHEST 1 VIEW: CPT

## 2024-06-04 PROCEDURE — 2580000003 HC RX 258: Performed by: STUDENT IN AN ORGANIZED HEALTH CARE EDUCATION/TRAINING PROGRAM

## 2024-06-04 PROCEDURE — 87040 BLOOD CULTURE FOR BACTERIA: CPT

## 2024-06-04 PROCEDURE — 86140 C-REACTIVE PROTEIN: CPT

## 2024-06-04 PROCEDURE — 80048 BASIC METABOLIC PNL TOTAL CA: CPT

## 2024-06-04 PROCEDURE — 81001 URINALYSIS AUTO W/SCOPE: CPT

## 2024-06-04 PROCEDURE — G0480 DRUG TEST DEF 1-7 CLASSES: HCPCS

## 2024-06-04 PROCEDURE — 83605 ASSAY OF LACTIC ACID: CPT

## 2024-06-04 PROCEDURE — 36415 COLL VENOUS BLD VENIPUNCTURE: CPT

## 2024-06-04 RX ORDER — 0.9 % SODIUM CHLORIDE 0.9 %
1000 INTRAVENOUS SOLUTION INTRAVENOUS ONCE
Status: DISCONTINUED | OUTPATIENT
Start: 2024-06-04 | End: 2024-06-04

## 2024-06-04 RX ORDER — ASPIRIN 81 MG/1
81 TABLET, CHEWABLE ORAL DAILY
Qty: 30 TABLET | Refills: 3 | Status: SHIPPED | OUTPATIENT
Start: 2024-06-05

## 2024-06-04 RX ORDER — LORAZEPAM 2 MG/ML
1 INJECTION INTRAMUSCULAR ONCE
Status: COMPLETED | OUTPATIENT
Start: 2024-06-04 | End: 2024-06-04

## 2024-06-04 RX ORDER — 0.9 % SODIUM CHLORIDE 0.9 %
30 INTRAVENOUS SOLUTION INTRAVENOUS ONCE
Status: COMPLETED | OUTPATIENT
Start: 2024-06-04 | End: 2024-06-04

## 2024-06-04 RX ORDER — MAGNESIUM SULFATE IN WATER 40 MG/ML
2000 INJECTION, SOLUTION INTRAVENOUS ONCE
Status: COMPLETED | OUTPATIENT
Start: 2024-06-04 | End: 2024-06-04

## 2024-06-04 RX ORDER — ATORVASTATIN CALCIUM 10 MG/1
10 TABLET, FILM COATED ORAL NIGHTLY
Qty: 30 TABLET | Refills: 3 | Status: SHIPPED | OUTPATIENT
Start: 2024-06-04

## 2024-06-04 RX ADMIN — ENOXAPARIN SODIUM 30 MG: 100 INJECTION SUBCUTANEOUS at 09:03

## 2024-06-04 RX ADMIN — SODIUM CHLORIDE, PRESERVATIVE FREE 10 ML: 5 INJECTION INTRAVENOUS at 09:03

## 2024-06-04 RX ADMIN — SODIUM CHLORIDE 2382 ML: 9 INJECTION, SOLUTION INTRAVENOUS at 18:59

## 2024-06-04 RX ADMIN — MAGNESIUM SULFATE HEPTAHYDRATE 2000 MG: 40 INJECTION, SOLUTION INTRAVENOUS at 21:25

## 2024-06-04 RX ADMIN — ASPIRIN 81 MG: 81 TABLET, CHEWABLE ORAL at 09:03

## 2024-06-04 RX ADMIN — PIPERACILLIN AND TAZOBACTAM 4500 MG: 4; .5 INJECTION, POWDER, FOR SOLUTION INTRAVENOUS at 21:17

## 2024-06-04 RX ADMIN — LORAZEPAM 1 MG: 2 INJECTION INTRAMUSCULAR; INTRAVENOUS at 18:58

## 2024-06-04 RX ADMIN — PANTOPRAZOLE SODIUM 40 MG: 40 INJECTION, POWDER, FOR SOLUTION INTRAVENOUS at 09:03

## 2024-06-04 RX ADMIN — BUPRENORPHINE HYDROCHLORIDE AND NALOXONE HYDROCHLORIDE DIHYDRATE 1 TABLET: 8; 2 TABLET SUBLINGUAL at 04:11

## 2024-06-04 RX ADMIN — CEFEPIME 2000 MG: 2 INJECTION, POWDER, FOR SOLUTION INTRAVENOUS at 09:04

## 2024-06-04 RX ADMIN — SODIUM CHLORIDE 1500 MG: 9 INJECTION, SOLUTION INTRAVENOUS at 06:50

## 2024-06-04 ASSESSMENT — PAIN - FUNCTIONAL ASSESSMENT: PAIN_FUNCTIONAL_ASSESSMENT: NONE - DENIES PAIN

## 2024-06-04 ASSESSMENT — PAIN SCALES - GENERAL: PAINLEVEL_OUTOF10: 0

## 2024-06-04 NOTE — PROGRESS NOTES
Outpatient Pharmacy Progress Note for Meds-to-Beds    The outpatient pharmacy received discharged prescription(s) for the patient.  The patient, however, was discharged without picking-up the medication(s).     The pharmacy has attempted to call the patient on 6/4/24 at phone numbers on file, but person answering states wrong phone and other phone not in service.   Prescriptions placed in prescription .     Thank you for letting [place] [address]    Phone: [phone]    Fax: [phone]

## 2024-06-04 NOTE — ED PROVIDER NOTES
Emergency Department Encounter        Pt Name: Dayo Ling  MRN: [id number]  Birthdate 1970  Date of evaluation: 6/4/2024  ED Physician: Kyle Bernal MD    CHIEF COMPLAINT     Triage Chief Complaint:   Heat Exposure (Out mowing all day. Call came in unreponsive but breathing. EMS reports pt laying in road breathing and responding. Sat 100%) and Altered Mental Status      HISTORY OF PRESENT ILLNESS & REVIEW OF SYSTEMS     History obtained from the patient and staff.    Dayo Ling is a 54 y.o. male who presents to the emergency department for evaluation of altered mental status.  History is limited due to altered mental status.  Apparently  a concerned citizen saw the patient later roadway and called EMS.  Apparently patient was outside mowing his lawn all day in the heat.  Was initially unresponsive but breathing, EMS says that they found him breathing on his own and was responding but is confused.  Patient denies any pain or falls or trauma.  Denies any alcohol or drug use.  Apparently EMS had trouble getting a accurate pulse ox but said that it was around 90 or so they put him on supplemental oxygen regardless.        Patient denies any new Headache, Fever, Chills, Cough, Chest pain, Shortness of breath, Abdominal pain, Nausea, Vomiting, Diarrhea, Constipation, and Leg swelling.    The patient has no other acute complaints at this time.  Review of systems as above.          PAST MED/SURG/SOCIAL/FAM HISTORY & ALLERGY & MEDICATIONS     Past Medical History:   Diagnosis Date    Diabetes mellitus (HCC)     Hep C w/o coma, chronic (HCC)      Patient Active Problem List   Diagnosis Code    Right shoulder pain M25.511    Other specified abnormal immunological findings in serum R76.8    HCV antibody positive R76.8    Nicotine dependence F17.200    Opioid dependence with withdrawal (HCC) F11.23    Opioid withdrawal (HCC) F11.93    DANNIE (acute kidney injury) (HCC) N17.9    Abnormal results of liver  Luanne Calvillo MD    vancomycin (VANCOCIN) 1,500 mg in sodium chloride 0.9 % 250 mL IVPB ([id number]), 1,500 mg, IntraVENous, Q24H, Luanne Calvillo MD, Stopped at 06/05/24 1116  Current Discharge Medication List        CONTINUE these medications which have NOT CHANGED    Details   aspirin 81 MG chewable tablet Take 1 tablet by mouth daily  Qty: 30 tablet, Refills: 3      atorvastatin (LIPITOR) 10 MG tablet Take 1 tablet by mouth nightly  Qty: 30 tablet, Refills: 3      albuterol sulfate  (90 BASE) MCG/ACT inhaler Inhale 2 puffs into the lungs every 6 hours as needed for Wheezing           Allergies   Allergen Reactions    Ceclor [Cefaclor] Itching    Vicodin [Hydrocodone-Acetaminophen] Other (See Comments)     RED, SWELLING , ITCHING     Past Surgical History:   Procedure Laterality Date    CHOLECYSTECTOMY      ORTHOPEDIC SURGERY      SPLENECTOMY, TOTAL       Social History     Socioeconomic History    Marital status:      Spouse name: Not on file    Number of children: Not on file    Years of education: Not on file    Highest education level: Not on file   Occupational History    Not on file   Tobacco Use    Smoking status: Every Day     Current packs/day: 1.00     Types: Cigarettes    Smokeless tobacco: Never   Vaping Use    Vaping Use: Never used   Substance and Sexual Activity    Alcohol use: No    Drug use: Yes     Types: IV, Opiates      Comment: HERION    Sexual activity: Not Currently     Partners: Female   Other Topics Concern    Not on file   Social History Narrative    Not on file     Social Determinants of Health     Financial Resource Strain: Not on file   Food Insecurity: No Food Insecurity (6/5/2024)    Hunger Vital Sign     Worried About Running Out of Food in the Last Year: Never true     Ran Out of Food in the Last Year: Never true   Transportation Needs: No Transportation Needs (6/5/2024)    PRAPARE - Transportation     Lack of Transportation (Medical): No     Lack of

## 2024-06-04 NOTE — ED NOTES
This tech cleaned pt and changed bedding, then inserted a straight cath to obtain urine per orders. Straight cath successful. Small amount of urine obtained and sent to lab. \"I peed just before you cathed me\" pt states. Obtained cup of ice water for patient after receiving permission from Dr. Bernal. [place] provided for patient. Pt oriented to call light. No further needs expressed at this time.

## 2024-06-04 NOTE — CONSULTS
Infectious Disease Consult Note  2024   Patient Name: Dayo Ling : 1970   Impression  Severe Sepsis:   Concern for Possible OM/Discitis:  Possible Native MV Endocarditis:  Possibly culture negative native MV endocarditis as BC have been clear. Suspected due to IV drug use. Concern source may be OM/discitis as he has had severe chronic low back pain, will await MRIs.   No reported allergies to ABX. CrCl 82, Cr baseline of 1.1 presented with 3.6 but has returned to baseline 6/3. Temp staying persistent at 99.1. Leukocytosis 56.6 on dwt to 44.3. Pct >100.    -UA WBC 5, RBC 1, urine culture GNR <10,000 CFU/ml  -BC 0/2 NGTD  -CT Head wo contrast: No acute intracranial findings.   -CT Head Neck W Contrast: Patent intracranial vessels  -PCXR: no acute CP disease  -CT AP wo Contrast: No acute findings in the A or P  -CT Chest wo Contrast: 1. No acute findings.   2. A nonspecific 3 mm left upper lobe pulmonary nodule.   -US Retroperitoneal: trace right perinephric fluid, non-specific. Overall no significant change from earlier the same day.  6/3-MRI Brain wo Contrast: Borderline abnormal study showing minimal nonspecific white matter changes which could be due to remote trauma, microangiopathy, or alternatively   due to the known amphetamine use in the past. No evidence of acute infarct   Past: 2020 from [facility]-Complete TTE: 1. Left ventricle: The cavity size was normal. Wall thickness was normal. Systolic function was at the lower limits of normal. The estimated ejection fraction was in the range of 50% to 55%. Left  ventricular diastolic function parameters were normal.   2. Regional wall motion abnormality: Mild hypokinesis of the basal-mid inferior myocardium.   3. Mitral valve: Cannot exclude vegetation.   4. Right ventricle: The cavity size was dilated. Systolic function was normal.   5. Pericardium, extracardiac: There was no pericardial effusion.   -Complete  with withdrawal (HCC) 11/25/2015    Opioid withdrawal (HCC) 11/25/2015     Past Medical History:   Diagnosis Date    Diabetes mellitus (HCC)     Hep C w/o coma, chronic (HCC)       Past Surgical History:   Procedure Laterality Date    CHOLECYSTECTOMY      ORTHOPEDIC SURGERY      SPLENECTOMY, TOTAL        History reviewed. No pertinent family history.   Infectious disease related family history - not contibutory.   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  No recent unusual exposures.  NO pets    ?  ALLERGIES  Allergies   Allergen Reactions    Vicodin [Hydrocodone-Acetaminophen] Other (See Comments)     RED, SWELLING , ITCHING      MEDICATIONS  Reviewed and are per the chart/EMR.   ?  Antibiotics:   Present:  Vancomycin 6/1-  Cefepime 6/1-  Past:  ?  -------------------------------------------------------------------------------------------------------------------    Vital Signs:  Vitals:    06/04/24 0853   BP: (!) 133/95   Pulse: 62   Resp: 19   Temp: 99.1 °F (37.3 °C)   SpO2: 95%         Exam:    VS: noted; wt 175 lb (79.4 kg) Height 5'11\"  Gen: alert and oriented X3, no distress, up in the chair.   Skin: no stigmata of endocarditis  Wounds: C/D/I-no open areas, has scattered track marks on upper extremities with no acute issues (no edema, erythema or drainage)  HEMT: AT/NC Oropharynx pink, moist, and without lesions or exudates; edentulous.   Eyes: PERRLA, EOMI, conjunctiva pink, sclera anicteric.   Neck: Supple. Trachea midline. No LAD.  Chest: no distress and CTA. Good air movement. Room air.   Back: low back pain constant and worsening with light palpation  Heart: NSR and no MRG.    Abd: soft, non-distended, no tenderness, no hepatomegaly. Normoactive bowel sounds.  Ext: no clubbing, cyanosis, or edema  Neuro: Mental status intact. CN 2-12 intact and no focal sensory or motor deficits    ?Diagnostic Studies: reviewed  6/1/2024 CT Head WO Contrast:  IMPRESSION:1. No acute intracranial findings.??    6/1/2024 CTA Head Neck W Contrast:  IMPRESSION:1. Patent intracranial vessels.    6/1/2024 XR Chest Portable;  Impression:1. No acute cardiopulmonary disease.    6/1/2024 CT Chest WO Contrast:  IMPRESSION:1. No acute findings in the abdomen or pelvis.    6/1/2024 CT Abdomen Pelvis WO Contrast:  IMPRESSION:1. No acute findings in the abdomen or pelvis.    6/1/2024 US Retroperitoneal Limited:  IMPRESSION:1. Trace right perinephric fluid, nonspecific..  Overall no significant change from earlier the same day.    6/3/2024 MRI Brain wo Contrast:  IMPRESSION:  Borderline abnormal study showing minimal nonspecific white matter changes which could be due to remote trauma, microangiopathy, or alternatively due to the known amphetamine use in the past. No evidence of acute infarct.    I have examined this patient and available medical records on this date and have made the above observations, conclusions and recommendations.  Electronically signed by: Electronically signed by FIFI Parker CNP on 6/4/2024 at 11:18 AM

## 2024-06-04 NOTE — DISCHARGE SUMMARY
V2.0  Discharge Summary    Name:  Dayo Ling /Age/Sex: 1970 (54 y.o. male)   Admit Date: 2024  Discharge Date: 24    MRN & CSN:  [id number] & [id number] Encounter Date and Time 24 2:13 PM EDT    Attending:  Adelso Chowdary MD Discharging Provider: Adelso Chowdary MD       Hospital Course:     Brief HPI: Pt is 54 M w PMH of PSA and as below who was admitted after having visual symptoms, CT head/CTA  unremarkable, MRI brain w \"Borderline abnormal study showing minimal nonspecific white matter changes which could be due to remote trauma, microangiopathy, or alternatively due to the known amphetamine use in the past. No evidence of acute infarct.\" Neurology was onboard while inpatient. Pt noticed o have high WBC count c/f sepsis. TTE c/f vegetation. ID consulted. Pt on broad spectrum abx. Initial  labs were c/w DANNIE, which resolved. ID planned to get MRI spine to r/o OM/ discitis. Pt however asking to leave AMA 24- Pt AAOx3, understands reason for admission and risks associated with leaving against medical advice including but not limited to . Pt denies SI/ HI/ depression symptoms and thinking about leaving hospital. Counselled on the imprortance of care plan and risks associated with leavign against medical advice. Pt appears to be having caacity per my assessment at this time. Pt willing to return if worsening.  Denies SI/HI/ depression/ anxiety affecting his decision.       Past Medical History:   Diagnosis Date    Diabetes mellitus (HCC)     Hep C w/o coma, chronic (HCC)          Brief Problem Based Course:     Dayo Ling is a 54 y.o. male with a pmh of hepatitis C who presented to ED on  with blurred vision and left lower extremity weakness that started 1 hour earlier.  -When seen by the ED physician she felt that he may have overdosed.  He responded to Narcan.     Severe sepsis-pt left AMA  -White count 45.8 in ED with 16% bands  -Blood cultures negative so far  -IV cefepime  -IV

## 2024-06-04 NOTE — PLAN OF CARE
Problem: Discharge Planning  Goal: Discharge to home or other facility with appropriate resources  Outcome: Progressing  Flowsheets (Taken 6/3/2024 2000)  Discharge to home or other facility with appropriate resources:   Identify barriers to discharge with patient and caregiver   Arrange for needed discharge resources and transportation as appropriate   Identify discharge learning needs (meds, wound care, etc)   Arrange for interpreters to assist at discharge as needed   Refer to discharge planning if patient needs post-hospital services based on physician order or complex needs related to functional status, cognitive ability or social support system     Problem: Pain  Goal: Verbalizes/displays adequate comfort level or baseline comfort level  Outcome: Progressing  Flowsheets (Taken 6/3/2024 2322)  Verbalizes/displays adequate comfort level or baseline comfort level:   Encourage patient to monitor pain and request assistance   Assess pain using appropriate pain scale   Administer analgesics based on type and severity of pain and evaluate response   Implement non-pharmacological measures as appropriate and evaluate response   Consider cultural and social influences on pain and pain management   Notify Licensed Independent Practitioner if interventions unsuccessful or patient reports new pain     Problem: Safety - Adult  Goal: Free from fall injury  Outcome: Progressing  Flowsheets (Taken 6/3/2024 2322)  Free From Fall Injury:   Instruct family/caregiver on patient safety   Based on caregiver fall risk screen, instruct family/caregiver to ask for assistance with transferring infant if caregiver noted to have fall risk factors     Problem: Neurosensory - Adult  Goal: Achieves stable or improved neurological status  Outcome: Progressing  Flowsheets (Taken 6/3/2024 2000)  Achieves stable or improved neurological status:   Assess for and report changes in neurological status   Initiate measures to prevent increased

## 2024-06-05 ENCOUNTER — APPOINTMENT (OUTPATIENT)
Dept: MRI IMAGING | Age: 54
DRG: 720 | End: 2024-06-05
Payer: MEDICAID

## 2024-06-05 ENCOUNTER — APPOINTMENT (OUTPATIENT)
Dept: NON INVASIVE DIAGNOSTICS | Age: 54
DRG: 720 | End: 2024-06-05
Attending: INTERNAL MEDICINE
Payer: MEDICAID

## 2024-06-05 PROBLEM — R41.82 ALTERED MENTAL STATUS: Status: ACTIVE | Noted: 2024-06-05

## 2024-06-05 LAB
LACTATE: 2.2 MMOL/L (ref 0.5–1.9)
TROPONIN, HIGH SENSITIVITY: 59 NG/L (ref 0–22)

## 2024-06-05 PROCEDURE — 85025 COMPLETE CBC W/AUTO DIFF WBC: CPT

## 2024-06-05 PROCEDURE — 7100000011 HC PHASE II RECOVERY - ADDTL 15 MIN: Performed by: INTERNAL MEDICINE

## 2024-06-05 PROCEDURE — 80048 BASIC METABOLIC PNL TOTAL CA: CPT

## 2024-06-05 PROCEDURE — 93325 DOPPLER ECHO COLOR FLOW MAPG: CPT | Performed by: INTERNAL MEDICINE

## 2024-06-05 PROCEDURE — 7100000010 HC PHASE II RECOVERY - FIRST 15 MIN: Performed by: INTERNAL MEDICINE

## 2024-06-05 PROCEDURE — 83605 ASSAY OF LACTIC ACID: CPT

## 2024-06-05 PROCEDURE — 2140000000 HC CCU INTERMEDIATE R&B

## 2024-06-05 PROCEDURE — 6360000002 HC RX W HCPCS: Performed by: INTERNAL MEDICINE

## 2024-06-05 PROCEDURE — 2580000003 HC RX 258: Performed by: INTERNAL MEDICINE

## 2024-06-05 PROCEDURE — 93320 DOPPLER ECHO COMPLETE: CPT | Performed by: INTERNAL MEDICINE

## 2024-06-05 PROCEDURE — 84484 ASSAY OF TROPONIN QUANT: CPT

## 2024-06-05 PROCEDURE — 72148 MRI LUMBAR SPINE W/O DYE: CPT

## 2024-06-05 PROCEDURE — 94761 N-INVAS EAR/PLS OXIMETRY MLT: CPT

## 2024-06-05 PROCEDURE — APPSS60 APP SPLIT SHARED TIME 46-60 MINUTES: Performed by: NURSE PRACTITIONER

## 2024-06-05 PROCEDURE — 99152 MOD SED SAME PHYS/QHP 5/>YRS: CPT | Performed by: INTERNAL MEDICINE

## 2024-06-05 PROCEDURE — 99255 IP/OBS CONSLTJ NEW/EST HI 80: CPT | Performed by: INTERNAL MEDICINE

## 2024-06-05 PROCEDURE — 36415 COLL VENOUS BLD VENIPUNCTURE: CPT

## 2024-06-05 PROCEDURE — 93312 ECHO TRANSESOPHAGEAL: CPT | Performed by: INTERNAL MEDICINE

## 2024-06-05 PROCEDURE — 6370000000 HC RX 637 (ALT 250 FOR IP): Performed by: INTERNAL MEDICINE

## 2024-06-05 PROCEDURE — 99253 IP/OBS CNSLTJ NEW/EST LOW 45: CPT | Performed by: INTERNAL MEDICINE

## 2024-06-05 PROCEDURE — 93325 DOPPLER ECHO COLOR FLOW MAPG: CPT

## 2024-06-05 RX ORDER — ACETAMINOPHEN 650 MG/1
650 SUPPOSITORY RECTAL EVERY 6 HOURS PRN
Status: DISCONTINUED | OUTPATIENT
Start: 2024-06-05 | End: 2024-06-07 | Stop reason: HOSPADM

## 2024-06-05 RX ORDER — ENOXAPARIN SODIUM 100 MG/ML
40 INJECTION SUBCUTANEOUS DAILY
Status: DISCONTINUED | OUTPATIENT
Start: 2024-06-05 | End: 2024-06-07 | Stop reason: HOSPADM

## 2024-06-05 RX ORDER — SODIUM CHLORIDE 9 MG/ML
INJECTION, SOLUTION INTRAVENOUS CONTINUOUS
Status: DISCONTINUED | OUTPATIENT
Start: 2024-06-05 | End: 2024-06-05

## 2024-06-05 RX ORDER — SODIUM CHLORIDE 0.9 % (FLUSH) 0.9 %
5-40 SYRINGE (ML) INJECTION PRN
Status: DISCONTINUED | OUTPATIENT
Start: 2024-06-05 | End: 2024-06-07 | Stop reason: HOSPADM

## 2024-06-05 RX ORDER — SODIUM CHLORIDE 9 MG/ML
INJECTION, SOLUTION INTRAVENOUS PRN
Status: DISCONTINUED | OUTPATIENT
Start: 2024-06-05 | End: 2024-06-07 | Stop reason: HOSPADM

## 2024-06-05 RX ORDER — SODIUM CHLORIDE 0.9 % (FLUSH) 0.9 %
5-40 SYRINGE (ML) INJECTION EVERY 12 HOURS SCHEDULED
Status: DISCONTINUED | OUTPATIENT
Start: 2024-06-05 | End: 2024-06-07 | Stop reason: HOSPADM

## 2024-06-05 RX ORDER — ACETAMINOPHEN 325 MG/1
650 TABLET ORAL EVERY 6 HOURS PRN
Status: DISCONTINUED | OUTPATIENT
Start: 2024-06-05 | End: 2024-06-07 | Stop reason: HOSPADM

## 2024-06-05 RX ORDER — HYDROXYZINE HYDROCHLORIDE 25 MG/1
50 TABLET, FILM COATED ORAL 3 TIMES DAILY PRN
Status: DISCONTINUED | OUTPATIENT
Start: 2024-06-05 | End: 2024-06-07 | Stop reason: HOSPADM

## 2024-06-05 RX ORDER — BUPRENORPHINE HYDROCHLORIDE AND NALOXONE HYDROCHLORIDE DIHYDRATE 8; 2 MG/1; MG/1
1 TABLET SUBLINGUAL 2 TIMES DAILY
Status: DISCONTINUED | OUTPATIENT
Start: 2024-06-05 | End: 2024-06-07 | Stop reason: HOSPADM

## 2024-06-05 RX ORDER — LIDOCAINE HYDROCHLORIDE 20 MG/ML
SOLUTION OROPHARYNGEAL PRN
Status: COMPLETED | OUTPATIENT
Start: 2024-06-05 | End: 2024-06-05

## 2024-06-05 RX ORDER — MIDAZOLAM HYDROCHLORIDE 1 MG/ML
INJECTION INTRAMUSCULAR; INTRAVENOUS PRN
Status: COMPLETED | OUTPATIENT
Start: 2024-06-05 | End: 2024-06-05

## 2024-06-05 RX ADMIN — MIDAZOLAM 2 MG: 1 INJECTION INTRAMUSCULAR; INTRAVENOUS at 10:34

## 2024-06-05 RX ADMIN — SODIUM CHLORIDE: 9 INJECTION, SOLUTION INTRAVENOUS at 02:18

## 2024-06-05 RX ADMIN — BUPRENORPHINE HYDROCHLORIDE AND NALOXONE HYDROCHLORIDE DIHYDRATE 1 TABLET: 8; 2 TABLET SUBLINGUAL at 17:11

## 2024-06-05 RX ADMIN — ENOXAPARIN SODIUM 40 MG: 100 INJECTION SUBCUTANEOUS at 09:27

## 2024-06-05 RX ADMIN — BUPRENORPHINE HYDROCHLORIDE AND NALOXONE HYDROCHLORIDE DIHYDRATE 1 TABLET: 8; 2 TABLET SUBLINGUAL at 09:35

## 2024-06-05 RX ADMIN — CEFEPIME 2000 MG: 2 INJECTION, POWDER, FOR SOLUTION INTRAVENOUS at 02:19

## 2024-06-05 RX ADMIN — MIDAZOLAM 2 MG: 1 INJECTION INTRAMUSCULAR; INTRAVENOUS at 10:36

## 2024-06-05 RX ADMIN — VANCOMYCIN HYDROCHLORIDE 1500 MG: 1.5 INJECTION, POWDER, LYOPHILIZED, FOR SOLUTION INTRAVENOUS at 09:46

## 2024-06-05 RX ADMIN — MIDAZOLAM 1 MG: 1 INJECTION INTRAMUSCULAR; INTRAVENOUS at 10:43

## 2024-06-05 RX ADMIN — SODIUM CHLORIDE: 9 INJECTION, SOLUTION INTRAVENOUS at 14:37

## 2024-06-05 RX ADMIN — CEFEPIME 2000 MG: 2 INJECTION, POWDER, FOR SOLUTION INTRAVENOUS at 14:38

## 2024-06-05 RX ADMIN — LIDOCAINE HYDROCHLORIDE 15 ML: 20 SOLUTION ORAL at 10:30

## 2024-06-05 RX ADMIN — SODIUM CHLORIDE, PRESERVATIVE FREE 10 ML: 5 INJECTION INTRAVENOUS at 09:40

## 2024-06-05 ASSESSMENT — ENCOUNTER SYMPTOMS
TROUBLE SWALLOWING: 0
NAUSEA: 0
VOMITING: 0
CHEST TIGHTNESS: 0
WHEEZING: 0
SHORTNESS OF BREATH: 0
CONSTIPATION: 0
VOICE CHANGE: 0
DIARRHEA: 0
BACK PAIN: 0
SORE THROAT: 0
ABDOMINAL PAIN: 0
SINUS PAIN: 0
COLOR CHANGE: 0
COUGH: 0
SINUS PRESSURE: 0

## 2024-06-05 ASSESSMENT — PAIN SCALES - GENERAL: PAINLEVEL_OUTOF10: 0

## 2024-06-05 NOTE — CONSULTS
Infectious Disease Consult Note  2024   Patient Name: Dayo Ling : 1970   Impression  Severe Sepsis:   Concern for Possible OM/Discitis:  Native MV Culture-Negative Endocarditis:  Possibly culture negative native MV endocarditis as BC have been clear. Suspected due to IV drug use. Concern source may be OM/discitis as he has had severe chronic low back pain, will await MRIs.   No reported allergies to ABX. CrCl 82, Cr baseline of 1.1 presented with 3.6 but has returned to baseline 6/3. Temp staying persistent at 99.1. Leukocytosis 56.6 on dwt to 44.3. Pct >100.    -UA WBC 5, RBC 1, urine culture GNR <10,000 CFU/ml  -BC 0/2 NGTD  -CT Head wo contrast: No acute intracranial findings.   -CT Head Neck W Contrast: Patent intracranial vessels  -PCXR: no acute CP disease  -CT AP wo Contrast: No acute findings in the A or P  -CT Chest wo Contrast: 1. No acute findings.   2. A nonspecific 3 mm left upper lobe pulmonary nodule.   -US Retroperitoneal: trace right perinephric fluid, non-specific. Overall no significant change from earlier the same day.  6/3-MRI Brain wo Contrast: Borderline abnormal study showing minimal nonspecific white matter changes which could be due to remote trauma, microangiopathy, or alternatively   due to the known amphetamine use in the past. No evidence of acute infarct   Past: 2020 from [facility]-Complete TTE: 1. Left ventricle: The cavity size was normal. Wall thickness was normal. Systolic function was at the lower limits of normal. The estimated ejection fraction was in the range of 50% to 55%. Left  ventricular diastolic function parameters were normal.   2. Regional wall motion abnormality: Mild hypokinesis of the basal-mid inferior myocardium.   3. Mitral valve: Cannot exclude vegetation.   4. Right ventricle: The cavity size was dilated. Systolic function was normal.   5. Pericardium, extracardiac: There was no pericardial effusion.  acute issues (no edema, erythema or drainage)  HEMT: AT/NC Oropharynx pink, moist, and without lesions or exudates; edentulous.   Eyes: PERRLA, EOMI, conjunctiva pink, sclera anicteric.   Neck: Supple. Trachea midline. No LAD.  Chest: no distress and CTA. Good air movement. Room air.   Back: low back pain constant and worsening with light palpation  Heart: NSR and no MRG.   Abd: soft, non-distended, no tenderness, no hepatomegaly. Normoactive bowel sounds.  Ext: no clubbing, cyanosis, or edema  Neuro: Mental status intact. CN 2-12 intact and no focal sensory or motor deficits    ?Diagnostic Studies: reviewed  6/1/2024 CT Head WO Contrast:  IMPRESSION:1. No acute intracranial findings.??     6/1/2024 CTA Head Neck W Contrast:  IMPRESSION:1. Patent intracranial vessels.     6/1/2024 XR Chest Portable;  Impression:1. No acute cardiopulmonary disease.     6/1/2024 CT Chest WO Contrast:  IMPRESSION:1. No acute findings in the abdomen or pelvis.     6/1/2024 CT Abdomen Pelvis WO Contrast:  IMPRESSION:1. No acute findings in the abdomen or pelvis.     6/1/2024 US Retroperitoneal Limited:  IMPRESSION:1. Trace right perinephric fluid, nonspecific..  Overall no significant change from earlier the same day.     6/3/2024 MRI Brain wo Contrast:  IMPRESSION:  Borderline abnormal study showing minimal nonspecific white matter changes which could be due to remote trauma, microangiopathy, or alternatively due to the known amphetamine use in the past. No evidence of acute infarct.  ??  I have examined this patient and available medical records on this date and have made the above observations, conclusions and recommendations.  Electronically signed by: Electronically signed by FIFI Parker CNP on 6/5/2024 at 1:37 PM

## 2024-06-05 NOTE — H&P
History and Physical      Name:  Dayo Ling /Age/Sex: 1970  (54 y.o. male)   MRN & CSN:  [id number] & [id number] Encounter Date/Time: 2024 10:30 PM EDT   Location:  [place] [address] PCP: No primary care provider on file.       Hospital Day: 1    Assessment and Plan:     #.  Suspected mitral valve endocarditis  -2D echo-possible vegetation visualized on the anterior leaflet of the mitral valve  -Consult cardiology for PHILIPPE  -Continue vancomycin, cefepime  -Patient was evaluated by ID recently and recommended MRI C/T/L spine-ordered    #.  Sepsis secondary to above  -Patient was tachycardic, lactic acid 7.5  -Leukocytosis resolved  -Blood cultures repeated in ER  -Continue vancomycin, cefepime  -Patient received fluids as per sepsis to call.    #.  Acute respiratory failure with hypoxia  -Patient was on supplemental oxygen, saturating %  -Wean off oxygen as tolerated.    #.  Hypomagnesemia  -Patient received 2 g of magnesium in ER  -Monitor with repeat magnesium level and replace as needed    #.  Detectable troponin  -Serial troponins ordered  -Cardiology consult    #.  DANNIE  #.  Anion gap metabolic acidosis  -Creatinine 1.7, was 1 at the time of discharge  -Continue IV fluids    #.  Fentanyl use  -Patient admits to using fentanyl after leaving AMA  -Patient was on COWS protocol in the recent admission, was started on Suboxone twice daily  -Continue Suboxone to prevent withdrawal    #.  Chronic tobacco dependence  Disposition:   Current Living situation: Home    Diet Regular   DVT Prophylaxis [x] Lovenox   Code Status Full   Surrogate Decision Maker/ POA Mother     History from:   EMR, patient.    History of Present Illness:     Chief Complaint: Endocarditis of mitral valve  Dayo Ling is a 54 y.o. male with fentanyl abuse and dependence who was recently admitted to the hospital (-2024), left AMA.  Patient was found unresponsive at the roadside and a bystander called EMS.  As per

## 2024-06-05 NOTE — PROGRESS NOTES
PHARMACY VANCOMYCIN MONITORING SERVICE  Pharmacy consulted by Dr. Calvillo for monitoring and adjustment.    Indication for treatment: Suspected mitral valve endocarditis  Goal trough: Trough Goal: 15-20 mcg/mL  AUC/CASSANDRA: 400-600    Risk Factors for MRSA Identified:   Current or recent history of IVDA    Pertinent Laboratory Values:   Temp Readings from Last 3 Encounters:   06/05/24 98.8 °F (37.1 °C) (Oral)   06/04/24 98.8 °F (37.1 °C) (Oral)   06/19/23 98 °F (36.7 °C) (Oral)     Recent Labs     06/03/24  0400 06/04/24  1341 06/04/24  1857   WBC 44.3* 15.2* 6.2     Recent Labs     06/03/24  0400 06/04/24  1341 06/04/24  1857   BUN 24* 13 19   CREATININE 1.1 1.0 1.7*     Estimated Creatinine Clearance: 51 mL/min (A) (based on SCr of 1.7 mg/dL (H)).  No intake or output data in the 24 hours ending 06/05/24 0550  Last Encounter Weight:  Wt Readings from Last 3 Encounters:   06/05/24 72.2 kg (159 lb 3.2 oz)   06/01/24 79.4 kg (175 lb)   06/19/23 77.1 kg (170 lb)       Pertinent Cultures:   Date    Source    Results  06/01   Blood    No growth at 72 hours  06/01   Urine    Gram Negative [place] < 10,000 CFU/mL  06/04   RESP Panel   None detected  06/04   Blood    In process    Assessment:  HPI: 54 y.o. male with history of fentanyl abuse and dependence.  Patient was recently admitted to the hospital (06/01 - 06/04) and left AMA.  Patient returned to hospital via EMS after bystander found patient unresponsive.  2D echo indicates possible vegetation on the anterior leaflet of the mitral valve.  SCr = 1.7, BUN = 19, and no I/O data  Day(s) of therapy: 5  (Resuming therapy; Started 06/01/2024)  Vancomycin concentration:   06/06 - To be collected    Plan:  Initially resume therapy at Vancomycin 1,500 mg IV Q 24 Hours  Monitor renal function closely  Pharmacy will continue to monitor patient and adjust therapy as indicated    VANCOMYCIN CONCENTRATION SCHEDULED FOR 06/06/2024 @ 6 am    Thank you for the consult.  Kirk Montiel,

## 2024-06-05 NOTE — PROGRESS NOTES
4 Eyes Skin Assessment     NAME:  Dayo Ling  YOB: 1970  MEDICAL RECORD NUMBER:  [id number]    The patient is being assessed for  Admission    I agree that at least one RN has performed a thorough Head to Toe Skin Assessment on the patient. ALL assessment sites listed below have been assessed.      Areas assessed by both nurses:    Head, Face, Ears, Shoulders, Back, Chest, Arms, Elbows, Hands, Sacrum. Buttock, Coccyx, Ischium, Legs. Feet and Heels, and Under Medical Devices         Does the Patient have a Wound? No noted wound(s). Patient has some scattered bruising on left foot.       Gadiel Prevention initiated by RN: Yes  Wound Care Orders initiated by RN: No    Pressure Injury (Stage 3,4, Unstageable, DTI, NWPT, and Complex wounds) if present, place Wound referral order by RN under : No    New Ostomies, if present place, Ostomy referral order under : No     Nurse 1 eSignature: Electronically signed by Sam Becerra RN on 6/5/24 at 3:44 AM EDT    **SHARE this note so that the co-signing nurse can place an eSignature**    Nurse 2 eSignature: Electronically signed by Damián Brody RN on 6/5/24 at 3:46 AM EDT

## 2024-06-05 NOTE — CARE COORDINATION
CM has reviewed pt’s chart for needs. CM screening shows that pt has insurance and is independent PTA. No PCP listed. PCP list added to d/c instructions. Substance abuse list added to D/C instructions. No other DC needs identified, at this time. If any d/c needs arise please contact MIS. NN

## 2024-06-05 NOTE — ED PROVIDER NOTES
Emergency Department Encounter  Location: [facility] EMERGENCY DEPARTMENT    Patient: Dayo Ling  MRN: [id number]  : 1970  Date of evaluation: 2024  ED Provider: Kirk Arechiga MD    2100:  Dayo Ling was checked out to me by Dr. Bernal. Please see his/her initial documentation for details of the patient's initial ED presentation, physical exam and completed studies.    In brief, Dayo Ling is a 54 y.o. male that presented to the emergency department with severe sepsis.    I have reviewed and interpreted all of the currently available lab results and diagnostics from this visit:  Results for orders placed or performed during the hospital encounter of 24   Respiratory Panel, Molecular, with COVID-19 (Restricted: peds pts or suitable admitted adults)    Specimen: Nasopharyngeal   Result Value Ref Range    Adenovirus Detection by PCR NOT DETECTED NOT DETECTED    Coronavirus 229E PCR NOT DETECTED NOT DETECTED    Coronavirus HKU1 PCR NOT DETECTED NOT DETECTED    Coronavirus NL63 PCR NOT DETECTED NOT DETECTED    Coronavirus OC43 PCR NOT DETECTED NOT DETECTED    SARS-CoV-2 NOT DETECTED NOT DETECTED    Human Metapneumovirus PCR NOT DETECTED NOT DETECTED    Rhinovirus Enterovirus PCR NOT DETECTED NOT DETECTED    Influenza A by PCR NOT DETECTED NOT DETECTED    Influenza A H1 Pandemic PCR NOT DETECTED NOT DETECTED    Influenza A H1 () PCR NOT DETECTED NOT DETECTED    Influenza A H3 PCR NOT DETECTED NOT DETECTED    Influenza B by PCR NOT DETECTED NOT DETECTED    Parainfluenza 1 PCR NOT DETECTED NOT DETECTED    Parainfluenza 2 PCR NOT DETECTED NOT DETECTED    Parainfluenza 3 PCR NOT DETECTED NOT DETECTED    Parainfluenza 4 PCR NOT DETECTED NOT DETECTED    RSV PCR NOT DETECTED NOT DETECTED    Bordetella parapertussis by PCR NOT DETECTED NOT DETECTED    B Pertussis by PCR NOT DETECTED NOT DETECTED    Chlamydophila Pneumonia PCR NOT DETECTED NOT DETECTED     Mycoplasma pneumo by PCR NOT DETECTED NOT DETECTED   CBC with Auto Differential   Result Value Ref Range    WBC 6.2 4.0 - 10.5 K/CU MM    RBC 4.12 (L) 4.6 - 6.2 M/CU MM    Hemoglobin 11.2 (L) 13.5 - 18.0 GM/DL    Hematocrit 34.4 (L) 42 - 52 %    MCV 83.5 78 - 100 FL    MCH 27.2 27 - 31 PG    MCHC 32.6 32.0 - 36.0 %    RDW 15.0 (H) 11.7 - 14.9 %    Platelets 213 140 - 440 K/CU MM    MPV 11.3 (H) 7.5 - 11.1 FL    Myelocyte Percent 1 (H) 0.0 %    Bands Relative 5 5 - 11 %    Neutrophils % 79.0 (H) 36 - 66 %    Eosinophils % 1.0 0 - 3 %    Lymphocytes % 14.0 (L) 24 - 44 %    nRBC 1     Myelocytes Absolute 0.06 K/CU MM    Bands Absolute 0.31 K/CU MM    Neutrophils Absolute 4.8 K/CU MM    Eosinophils Absolute 0.1 K/CU MM    Lymphocytes Absolute 0.9 K/CU MM    Differential Type MANUAL DIFFERENTIAL     RBC Morphology RARE     Hypochromia 1+     Tear Drop Cells 1+     Jaden Cells 1+    CMP   Result Value Ref Range    Sodium 141 135 - 145 MMOL/L    Potassium 3.8 3.5 - 5.1 MMOL/L    Chloride 106 99 - 110 mMol/L    CO2 14 (L) 21 - 32 MMOL/L    Anion Gap 21 (H) 7 - 16    Glucose 118 (H) 70 - 99 MG/DL    BUN 19 6 - 23 MG/DL    Creatinine 1.7 (H) 0.9 - 1.3 MG/DL    Est, Glom Filt Rate 47 (L) >60 mL/min/1.73m2    Calcium 8.3 8.3 - 10.6 MG/DL    Total Protein 6.4 6.4 - 8.2 GM/DL    Albumin 3.1 (L) 3.4 - 5.0 GM/DL    Total Bilirubin 0.8 0.0 - 1.0 MG/DL    Alkaline Phosphatase 579 (H) 40 - 129 IU/L    ALT 25 10 - 40 U/L    AST 28 15 - 37 IU/L   Lactate, Sepsis   Result Value Ref Range    Lactic Acid, Sepsis 7.5 (HH) 0.4 - 2.0 mMOL/L   Lactate, Sepsis   Result Value Ref Range    Lactic Acid, Sepsis 5.3 (HH) 0.4 - 2.0 mMOL/L   ETOH   Result Value Ref Range    Alcohol Scrn <0.01 <0.01 %WT/VOL   Magnesium   Result Value Ref Range    Magnesium 0.9 (L) 1.8 - 2.4 mg/dl   Troponin Now and Q1Hr   Result Value Ref Range    Troponin, High Sensitivity 37 (H) 0 - 22 ng/L   Troponin Now and Q1Hr   Result Value Ref Range    Troponin, High Sensitivity

## 2024-06-05 NOTE — PROGRESS NOTES
Pt tele leads not reading on box, this nurse went into patients room, all leads changed, new wires applied, and new wire connection to box applied. Still not reading. Maintenance made aware.     Called [employer] and created ticket number [id number]

## 2024-06-05 NOTE — ED NOTES
ED TO INPATIENT SBAR HANDOFF    Patient Name: Dayo Ling   :  1970  54 y.o.   Preferred Name  Dayo  Family/Caregiver Present no   Restraints no   C-SSRS:    Sitter no   Sepsis Risk Score Sepsis Risk Score: 5.6      Situation  Chief Complaint   Patient presents with    Heat Exposure     Out mowing all day. Call came in unreponsive but breathing. EMS reports pt laying in road breathing and responding. Sat 100%    Altered Mental Status     Brief Description of Patient's Condition: Pt came in for AMS and heat exposure. Pt was found in a roadway and transported by EMS.  Pt is a sepsis alert and has received antibiotics and fluids. Pt has an increased lactic acid level as well as increased troponin.  Mental Status: alert  Arrived from: unknown    Imaging:   CT Head W/O Contrast   Final Result      No acute intracranial process.                  ** If there are any questions about this report, I can be reached on   [employer] or at [phone] **      XR CHEST PORTABLE   Final Result      1. No acute disease. Healed left rib fractures.            CT CSpine W/O Contrast    (Results Pending)     Abnormal labs:   Abnormal Labs Reviewed   CBC WITH AUTO DIFFERENTIAL - Abnormal; Notable for the following components:       Result Value    RBC 4.12 (*)     Hemoglobin 11.2 (*)     Hematocrit 34.4 (*)     RDW 15.0 (*)     MPV 11.3 (*)     Myelocyte Percent 1 (*)     Neutrophils % 79.0 (*)     Lymphocytes % 14.0 (*)     All other components within normal limits   COMPREHENSIVE METABOLIC PANEL - Abnormal; Notable for the following components:    CO2 14 (*)     Anion Gap 21 (*)     Glucose 118 (*)     Creatinine 1.7 (*)     Est, Glom Filt Rate 47 (*)     Albumin 3.1 (*)     Alkaline Phosphatase 579 (*)     All other components within normal limits   LACTATE, SEPSIS - Abnormal; Notable for the following components:    Lactic Acid, Sepsis 7.5 (*)     All other components within normal limits   LACTATE, SEPSIS - Abnormal;  Rate:      Cardiac Rhythm: ST  Last documented pain medication administered: NA  NIH Score: NIH     Active LDA's:   Peripheral IV 06/04/24 Left Cephalic (Active)       Peripheral IV 06/04/24 Distal;Right;Anterior Cephalic (Active)   Site Assessment Clean, dry & intact 06/04/24 5724       Pertinent or High Risk Medications/Drips: no   If Yes, please provide details: na  Blood Product Administration: no  If Yes, please provide details: na    Recommendation    Incomplete orders no  Additional Comments: no   If any further questions, please call Sending RN at [phone]    Electronically signed by: Electronically signed by Edgardo Paige RN on 6/5/2024 at 12:22 AM

## 2024-06-05 NOTE — PLAN OF CARE
Problem: Discharge Planning  Goal: Discharge to home or other facility with appropriate resources  6/5/2024 1953 by Sam Becerra RN  Outcome: Progressing  6/5/2024 1844 by Juliet Casey RN  Outcome: Progressing     Problem: Confusion  Goal: Confusion, delirium, dementia, or psychosis is improved or at baseline  Description: INTERVENTIONS:  1. Assess for possible contributors to thought disturbance, including medications, impaired vision or hearing, underlying metabolic abnormalities, dehydration, psychiatric diagnoses, and notify attending LIP  2. [place] high risk fall precautions, as indicated  3. Provide frequent short contacts to provide reality reorientation, refocusing and direction  4. Decrease environmental stimuli, including noise as appropriate  5. Monitor and intervene to maintain adequate nutrition, hydration, elimination, sleep and activity  6. If unable to ensure safety without constant attention obtain sitter and review sitter guidelines with assigned personnel  7. Initiate Psychosocial CNS and Spiritual Care consult, as indicated  6/5/2024 1953 by Sam Becerra RN  Outcome: Progressing  6/5/2024 1844 by Juliet Casey RN  Outcome: Progressing     Problem: Safety - Adult  Goal: Free from fall injury  6/5/2024 1953 by Sam Becerra RN  Outcome: Progressing  6/5/2024 1844 by Juliet Casey RN  Outcome: Progressing     Problem: Risk for Elopement  Goal: Patient will not exit the unit/facility without proper excort  6/5/2024 1953 by Sam Becerra RN  Outcome: Progressing  6/5/2024 1844 by Juliet Casey RN  Outcome: Progressing     Problem: ABCDS Injury Assessment  Goal: Absence of physical injury  Outcome: Progressing

## 2024-06-05 NOTE — ACP (ADVANCE CARE PLANNING)
Discussed code status with patient- opted FULL CODE  Surrogate decision maker- Mother- Vilma Guidry Gladys- [phone]

## 2024-06-05 NOTE — PLAN OF CARE
Problem: Discharge Planning  Goal: Discharge to home or other facility with appropriate resources  Outcome: Progressing     Problem: Confusion  Goal: Confusion, delirium, dementia, or psychosis is improved or at baseline  Description: INTERVENTIONS:  1. Assess for possible contributors to thought disturbance, including medications, impaired vision or hearing, underlying metabolic abnormalities, dehydration, psychiatric diagnoses, and notify attending LIP  2. [place] high risk fall precautions, as indicated  3. Provide frequent short contacts to provide reality reorientation, refocusing and direction  4. Decrease environmental stimuli, including noise as appropriate  5. Monitor and intervene to maintain adequate nutrition, hydration, elimination, sleep and activity  6. If unable to ensure safety without constant attention obtain sitter and review sitter guidelines with assigned personnel  7. Initiate Psychosocial CNS and Spiritual Care consult, as indicated  Outcome: Progressing     Problem: Safety - Adult  Goal: Free from fall injury  Outcome: Progressing     Problem: Risk for Elopement  Goal: Patient will not exit the unit/facility without proper excort  Outcome: Progressing

## 2024-06-05 NOTE — PLAN OF CARE
Problem: Discharge Planning  Goal: Discharge to home or other facility with appropriate resources  Outcome: Progressing     Problem: Confusion  Goal: Confusion, delirium, dementia, or psychosis is improved or at baseline  Description: INTERVENTIONS:  1. Assess for possible contributors to thought disturbance, including medications, impaired vision or hearing, underlying metabolic abnormalities, dehydration, psychiatric diagnoses, and notify attending LIP  2. [place] high risk fall precautions, as indicated  3. Provide frequent short contacts to provide reality reorientation, refocusing and direction  4. Decrease environmental stimuli, including noise as appropriate  5. Monitor and intervene to maintain adequate nutrition, hydration, elimination, sleep and activity  6. If unable to ensure safety without constant attention obtain sitter and review sitter guidelines with assigned personnel  7. Initiate Psychosocial CNS and Spiritual Care consult, as indicated  Outcome: Progressing     Problem: Safety - Adult  Goal: Free from fall injury  Outcome: Progressing     Problem: Risk for Elopement  Goal: Patient will not exit the unit/facility without proper excort  Outcome: Progressing  Flowsheets (Taken 6/5/2024 0100)  Nursing Interventions for Elopement Risk:   Assist with personal care needs such as toileting, eating, dressing, as needed to reduce the risk of wandering   Collaborate with family members/caregivers to mitigate the elopement risk   Collaborate with treatment team for drug withdrawal symptoms treatment   Collaborate with treatment team for nicotine replacement   Communicate/escalate to charge nurse the risk of elopement   Make sure patient has all necessary personal care items   Place patient in room far away from exits and stairways   Reduce environmental triggers   Shoes and clothing collected and placed in gown attire

## 2024-06-05 NOTE — PROGRESS NOTES
Pt came to mri and attempted to complete mri of lumbar thor and cerv- pt stopped exam in middle of lumbar and stated that he will not continue and will not come back down for any more mri studies.  He refused meds.  Nurse informed and the limited study of the lumbar was submitted.  Nurse to talk to doctor before mri will be cancelled

## 2024-06-05 NOTE — PROGRESS NOTES
V2.0  [facility] Hospitalist Progress Note      Name:  Dayo Ling /Age/Sex: 1970  (54 y.o. male)   MRN & CSN:  [id number] & [id number] Encounter Date/Time: 2024 1:28 PM EDT    Location:  [address] [place] [address] PCP: No primary care provider on file.       Hospital Day: 2    Assessment and Plan:   Dayo Ling is a 54 y.o. male  who presents with Endocarditis of mitral valve      Plan:    Sepsis secondary to endocarditis  Suspected mitral valve endocarditis.  Seen on TTE.  Cardiology consulted: PHILIPPE planned for today  Infectious disease consulted  IV antibiotics: Vancomycin plus cefepime  Follow-up MRI C/T/L-spine    DANNIE  Creatinine 1.7.  Was 1 at last discharge  Received IV fluids    Substance abuse  Patient used fentanyl after leaving AMA on last admission.  Found down.  Continue Suboxone      Diet ADULT DIET; Regular   DVT Prophylaxis [x] Lovenox, []  Heparin, [] SCDs, [] Ambulation,    [] Eliquis, [] Xarelto  [] Coumadin [] other   Code Status Full Code   Disposition From: Home  Expected Disposition: To be determined  Estimated Date of Discharge: To be determined  Patient requires continued admission due to iv ABX   Surrogate Decision Maker/ POA      Personally reviewed Lab Studies and Imaging     Discussed management of the case with infectious disease who recommended IV antibiotics and PHILIPPE    EKG interpreted personally and results no acute ST changes    Drugs that require monitoring for toxicity include Comycin and the method of monitoring was blood level monitoring    Subjective:     Chief Complaint: Heat Exposure (Out mowing all day. Call came in unreponsive but breathing. EMS reports pt laying in road breathing and responding. Sat 100%) and Altered Mental Status       Patient with PHILIPPE today with cardiology.  Patient seen afterwards and a little sleepy.    Review of Systems:    Review of Systems   Constitutional:  Negative for activity change, appetite change, chills, fatigue and fever.   HENT:  Negative

## 2024-06-05 NOTE — DISCHARGE INSTRUCTIONS
Please follow-up with infectious disease next Friday (7/14/2024) at 9:40 am.          1. Call to schedule follow up hospital follow up appointment:   [facility]  [address]  [address]  [place], [place] [address]  Tel: [phone]    [facility]  [address]  [place], [place] [address]  [phone]     [facility]   [address] [place], [place]   [phone]     2. Establish care with a primary care provider  Physician Finder [phone]     [facility]  [address], [address], [place] [address]  [phone]  FIFI Sanders,CNP    [facility]  [address] [place] [address]  [phone]  MD Madison Kenny MD Deanna N. Smith, APRN, CNP  Sanjuana Shirley, FIFI Daniels, CNP     [facility]  [address], [address], [place] [address]  [phone]  Diana Che, DO Kishor Ham PAMD Erick Blackman, NETTA    [facility]  [address] [place] [address]  [phone]  Ada Rodriguez MD    [facility]  [address] [place] [address] [phone]  MD Jimmie Chavez MD    [facility]  [address] [place] [address]  [phone]  FIFI Hickey, CNP  FIFI Arias, CNP   MD Mai Vivas, PAKellyC   Anthony Germain MD    [facility] (*Active Waiting List*)   [address] [place], [place]  [phone]    Dr. Brenda Soliz MD  [address] [place] [address] [phone]    [facility]  [address], [place] [address] [phone]    [facility]

## 2024-06-06 LAB
ANION GAP SERPL CALCULATED.3IONS-SCNC: 14 MMOL/L (ref 7–16)
ANISOCYTOSIS: ABNORMAL
BANDED NEUTROPHILS ABSOLUTE COUNT: 4.92 K/CU MM
BANDED NEUTROPHILS RELATIVE PERCENT: 7 % (ref 5–11)
BUN SERPL-MCNC: 21 MG/DL (ref 6–23)
BURR CELLS: ABNORMAL
CALCIUM SERPL-MCNC: 7.9 MG/DL (ref 8.3–10.6)
CHLORIDE BLD-SCNC: 105 MMOL/L (ref 99–110)
CO2: 20 MMOL/L (ref 21–32)
CREAT SERPL-MCNC: 1.1 MG/DL (ref 0.9–1.3)
CRP SERPL HS-MCNC: 239 MG/L
CULTURE: ABNORMAL
CULTURE: ABNORMAL
CULTURE: NORMAL
CULTURE: NORMAL
DIFFERENTIAL TYPE: ABNORMAL
DOSE AMOUNT: NORMAL
DOSE TIME: NORMAL
ECHO BSA: 1.9 M2
EKG ATRIAL RATE: 169 BPM
EKG DIAGNOSIS: NORMAL
EKG P AXIS: 80 DEGREES
EKG P-R INTERVAL: 122 MS
EKG Q-T INTERVAL: 276 MS
EKG QRS DURATION: 72 MS
EKG QTC CALCULATION (BAZETT): 462 MS
EKG R AXIS: 47 DEGREES
EKG T AXIS: 79 DEGREES
EKG VENTRICULAR RATE: 169 BPM
EOSINOPHILS ABSOLUTE: 0.7 K/CU MM
EOSINOPHILS RELATIVE PERCENT: 1 % (ref 0–3)
GFR, ESTIMATED: 80 ML/MIN/1.73M2
GLUCOSE SERPL-MCNC: 73 MG/DL (ref 70–99)
HCT VFR BLD CALC: 27.9 % (ref 42–52)
HEMOGLOBIN: 9.3 GM/DL (ref 13.5–18)
HYPOCHROMIA: ABNORMAL
LYMPHOCYTES ABSOLUTE: 11.2 K/CU MM
LYMPHOCYTES RELATIVE PERCENT: 16 % (ref 24–44)
Lab: ABNORMAL
Lab: NORMAL
Lab: NORMAL
MAGNESIUM: 1.5 MG/DL (ref 1.8–2.4)
MCH RBC QN AUTO: 27.4 PG (ref 27–31)
MCHC RBC AUTO-ENTMCNC: 33.3 % (ref 32–36)
MCV RBC AUTO: 82.1 FL (ref 78–100)
MONOCYTES ABSOLUTE: 1.4 K/CU MM
MONOCYTES RELATIVE PERCENT: 2 % (ref 0–4)
MYELOCYTE PERCENT: 2 %
MYELOCYTES ABSOLUTE COUNT: 1.41 K/CU MM
NEUTROPHILS ABSOLUTE: 50.7 K/CU MM
NEUTROPHILS RELATIVE PERCENT: 72 % (ref 36–66)
PDW BLD-RTO: 15.5 % (ref 11.7–14.9)
PLATELET # BLD: 169 K/CU MM (ref 140–440)
PMV BLD AUTO: 12.4 FL (ref 7.5–11.1)
POTASSIUM SERPL-SCNC: 3.2 MMOL/L (ref 3.5–5.1)
RBC # BLD: 3.4 M/CU MM (ref 4.6–6.2)
RBC # BLD: ABNORMAL 10*6/UL
SODIUM BLD-SCNC: 139 MMOL/L (ref 135–145)
SPECIMEN: ABNORMAL
SPECIMEN: NORMAL
SPECIMEN: NORMAL
VANCOMYCIN RANDOM: 13.4 UG/ML
WBC # BLD: 70.3 K/CU MM (ref 4–10.5)

## 2024-06-06 PROCEDURE — 6370000000 HC RX 637 (ALT 250 FOR IP): Performed by: INTERNAL MEDICINE

## 2024-06-06 PROCEDURE — 6370000000 HC RX 637 (ALT 250 FOR IP): Performed by: STUDENT IN AN ORGANIZED HEALTH CARE EDUCATION/TRAINING PROGRAM

## 2024-06-06 PROCEDURE — 80048 BASIC METABOLIC PNL TOTAL CA: CPT

## 2024-06-06 PROCEDURE — 93010 ELECTROCARDIOGRAM REPORT: CPT | Performed by: INTERNAL MEDICINE

## 2024-06-06 PROCEDURE — 86140 C-REACTIVE PROTEIN: CPT

## 2024-06-06 PROCEDURE — 99233 SBSQ HOSP IP/OBS HIGH 50: CPT | Performed by: NURSE PRACTITIONER

## 2024-06-06 PROCEDURE — 6360000002 HC RX W HCPCS: Performed by: STUDENT IN AN ORGANIZED HEALTH CARE EDUCATION/TRAINING PROGRAM

## 2024-06-06 PROCEDURE — 6370000000 HC RX 637 (ALT 250 FOR IP): Performed by: NURSE PRACTITIONER

## 2024-06-06 PROCEDURE — 85007 BL SMEAR W/DIFF WBC COUNT: CPT

## 2024-06-06 PROCEDURE — APPNB15 APP NON BILLABLE TIME 0-15 MINS

## 2024-06-06 PROCEDURE — 85027 COMPLETE CBC AUTOMATED: CPT

## 2024-06-06 PROCEDURE — 83735 ASSAY OF MAGNESIUM: CPT

## 2024-06-06 PROCEDURE — 6360000002 HC RX W HCPCS: Performed by: INTERNAL MEDICINE

## 2024-06-06 PROCEDURE — 36415 COLL VENOUS BLD VENIPUNCTURE: CPT

## 2024-06-06 PROCEDURE — 2580000003 HC RX 258: Performed by: STUDENT IN AN ORGANIZED HEALTH CARE EDUCATION/TRAINING PROGRAM

## 2024-06-06 PROCEDURE — 94761 N-INVAS EAR/PLS OXIMETRY MLT: CPT

## 2024-06-06 PROCEDURE — 80202 ASSAY OF VANCOMYCIN: CPT

## 2024-06-06 PROCEDURE — 2580000003 HC RX 258: Performed by: INTERNAL MEDICINE

## 2024-06-06 PROCEDURE — 2140000000 HC CCU INTERMEDIATE R&B

## 2024-06-06 RX ORDER — POTASSIUM CHLORIDE 20 MEQ/1
40 TABLET, EXTENDED RELEASE ORAL EVERY 4 HOURS
Status: COMPLETED | OUTPATIENT
Start: 2024-06-06 | End: 2024-06-06

## 2024-06-06 RX ORDER — LORAZEPAM 0.5 MG/1
0.5 TABLET ORAL ONCE
Status: COMPLETED | OUTPATIENT
Start: 2024-06-06 | End: 2024-06-06

## 2024-06-06 RX ORDER — MAGNESIUM SULFATE 4 G/50ML
4000 INJECTION INTRAVENOUS ONCE
Status: COMPLETED | OUTPATIENT
Start: 2024-06-06 | End: 2024-06-06

## 2024-06-06 RX ORDER — DIPHENHYDRAMINE HYDROCHLORIDE AND LIDOCAINE HYDROCHLORIDE AND ALUMINUM HYDROXIDE AND MAGNESIUM HYDRO
5 KIT 4 TIMES DAILY PRN
Status: DISCONTINUED | OUTPATIENT
Start: 2024-06-06 | End: 2024-06-07 | Stop reason: HOSPADM

## 2024-06-06 RX ADMIN — BUPRENORPHINE HYDROCHLORIDE AND NALOXONE HYDROCHLORIDE DIHYDRATE 1 TABLET: 8; 2 TABLET SUBLINGUAL at 17:09

## 2024-06-06 RX ADMIN — HYDROXYZINE HYDROCHLORIDE 50 MG: 25 TABLET, FILM COATED ORAL at 23:35

## 2024-06-06 RX ADMIN — BUPRENORPHINE HYDROCHLORIDE AND NALOXONE HYDROCHLORIDE DIHYDRATE 1 TABLET: 8; 2 TABLET SUBLINGUAL at 08:52

## 2024-06-06 RX ADMIN — CEFEPIME 2000 MG: 2 INJECTION, POWDER, FOR SOLUTION INTRAVENOUS at 02:11

## 2024-06-06 RX ADMIN — DIPHENHYDRAMINE HYDROCHLORIDE AND LIDOCAINE HYDROCHLORIDE AND ALUMINUM HYDROXIDE AND MAGNESIUM HYDRO 5 ML: KIT at 14:29

## 2024-06-06 RX ADMIN — POTASSIUM CHLORIDE 40 MEQ: 1500 TABLET, EXTENDED RELEASE ORAL at 14:38

## 2024-06-06 RX ADMIN — POTASSIUM CHLORIDE 40 MEQ: 1500 TABLET, EXTENDED RELEASE ORAL at 08:52

## 2024-06-06 RX ADMIN — SODIUM CHLORIDE: 9 INJECTION, SOLUTION INTRAVENOUS at 02:10

## 2024-06-06 RX ADMIN — LORAZEPAM 0.5 MG: 0.5 TABLET ORAL at 21:33

## 2024-06-06 RX ADMIN — VANCOMYCIN HYDROCHLORIDE 1000 MG: 1 INJECTION, POWDER, LYOPHILIZED, FOR SOLUTION INTRAVENOUS at 21:39

## 2024-06-06 RX ADMIN — VANCOMYCIN HYDROCHLORIDE 1500 MG: 1.5 INJECTION, POWDER, LYOPHILIZED, FOR SOLUTION INTRAVENOUS at 08:51

## 2024-06-06 RX ADMIN — CEFEPIME 2000 MG: 2 INJECTION, POWDER, FOR SOLUTION INTRAVENOUS at 23:32

## 2024-06-06 RX ADMIN — SODIUM CHLORIDE, PRESERVATIVE FREE 10 ML: 5 INJECTION INTRAVENOUS at 08:52

## 2024-06-06 RX ADMIN — ENOXAPARIN SODIUM 40 MG: 100 INJECTION SUBCUTANEOUS at 08:52

## 2024-06-06 RX ADMIN — MICAFUNGIN SODIUM 100 MG: 100 INJECTION, POWDER, LYOPHILIZED, FOR SOLUTION INTRAVENOUS at 11:08

## 2024-06-06 RX ADMIN — CEFEPIME 2000 MG: 2 INJECTION, POWDER, FOR SOLUTION INTRAVENOUS at 17:12

## 2024-06-06 RX ADMIN — MAGNESIUM SULFATE HEPTAHYDRATE 4000 MG: 80 INJECTION, SOLUTION INTRAVENOUS at 11:17

## 2024-06-06 ASSESSMENT — ENCOUNTER SYMPTOMS
CHEST TIGHTNESS: 0
VOICE CHANGE: 0
ABDOMINAL PAIN: 0
COLOR CHANGE: 0
SINUS PAIN: 0
CONSTIPATION: 0
COUGH: 0
DIARRHEA: 0
SINUS PRESSURE: 0
NAUSEA: 0
VOMITING: 0
WHEEZING: 0
SHORTNESS OF BREATH: 0
BACK PAIN: 0
TROUBLE SWALLOWING: 0
SORE THROAT: 0

## 2024-06-06 ASSESSMENT — PAIN SCALES - GENERAL
PAINLEVEL_OUTOF10: 0
PAINLEVEL_OUTOF10: 0

## 2024-06-06 NOTE — CARE COORDINATION
.CM met with pt for d/c planning.  Introduced self, updated white board and explained role of CM. Pt lives with his mother and is independent with ADL's.  Pt does not have a PCP. Pt has insurance, and is able to afford his medication. Pt admits to using Fentanyl. CM offered to help him get into an inpt Drug Rehab.  Pt states that his mother is working on getting him set up for out pt drug rehab.  Pt states that he wants out of here.  He states that it is hard for him to stay in the hospital because he can't stand to sit still. CM informed him that he really needs to stay in the hospital until the Dr thinks that he is medically ready for d/c.  He states \"Do you really think I give a shit about my health since I have been doing drugs for 30yrs.\" Pt states that the only reason that he is here is for his mother.  If pt needs to be on an IV atb when discharged he will have to go to the infusion clinic d/t he cannot be discharged with any IV access d/t his IV drug use. Pt states that he would be agreeable to go to the infusion clinic. He states that he knows that he cannot d/c with IV access.  He states that he used his IV access previous admission to inject fentanyl.  Pt's nurse in room when pt stated this.  Pt denies any needs at this time.   D/c plan is home with mother.  PCP list and Substance abuse resources added to d/c instructions.  Pt will need to go to the infusion clinic if he needs to d/c on an IV atb. Notify CM if any d/c needs arise.  TE       06/06/24 2213   Service Assessment   Patient Orientation Alert and Oriented   Cognition Alert   History Provided By Patient   Primary Caregiver Self   Support Systems Parent   Patient's Healthcare Decision Maker is:   (SELF)   PCP Verified by CM No  (NO PCP)   Prior Functional Level Independent in ADLs/IADLs   Current Functional Level Independent in ADLs/IADLs   Can patient return to prior living arrangement Yes   Ability to make needs known: Good   Family able to

## 2024-06-06 NOTE — PROGRESS NOTES
PHARMACY VANCOMYCIN MONITORING SERVICE  Pharmacy consulted by Dr. Calvillo for monitoring and adjustment.    Indication for treatment: Suspected mitral valve endocarditis  Goal trough: Trough Goal: 15-20 mcg/mL  AUC/CASSANDRA: 400-600    Risk Factors for MRSA Identified:   Current or recent history of IVDA    Pertinent Laboratory Values:   Temp Readings from Last 3 Encounters:   06/06/24 97.9 °F (36.6 °C) (Oral)   06/04/24 98.8 °F (37.1 °C) (Oral)   06/19/23 98 °F (36.7 °C) (Oral)     Recent Labs     06/04/24  1341 06/04/24  1857 06/05/24  0933 06/06/24  0234   WBC 15.2* 6.2  --  70.3*   LACTATE  --   --  2.2*  --        Recent Labs     06/04/24  1341 06/04/24  1857 06/06/24  0234   BUN 13 19 21   CREATININE 1.0 1.7* 1.1       Estimated Creatinine Clearance: 79 mL/min (based on SCr of 1.1 mg/dL).    Intake/Output Summary (Last 24 hours) at 6/6/2024 1254  Last data filed at 6/5/2024 2131  Gross per 24 hour   Intake --   Output 1825 ml   Net -1825 ml     Last Encounter Weight:  Wt Readings from Last 3 Encounters:   06/06/24 73 kg (160 lb 15 oz)   06/01/24 79.4 kg (175 lb)   06/19/23 77.1 kg (170 lb)   Ideal body weight: 75.3 kg (166 lb 0.1 oz)      Pertinent Cultures:   Date    Source    Results  06/01   Blood    No growth at 72 hours  06/01   Urine    Gram Negative [place] < 10,000 CFU/mL  06/04   RESP Panel   None detected  06/04   Blood    1/4 yeast    Assessment:  HPI: 54 y.o. male with history of fentanyl abuse and dependence.  Patient was recently admitted to the hospital (06/01 - 06/04) and left AMA.  Patient returned to hospital via EMS after bystander found patient unresponsive.  2D echo indicates possible vegetation on the anterior leaflet of the mitral valve.  0/6 - WBC jump to 70.3  Renal Function:  Scr 1.1 today from 1.7 on 06/04  Day(s) of therapy: 6  (Resuming therapy; Started 06/01/2024)  Vancomycin concentration:   06/06 - 13.4 ~17 hours after last dose of vancomycin 1500 mg IV q12hr,  mg/L*hr and  ssTrough of 9.3 mg/L    Plan:  Scr improved, with level from this AM will change dose to 1000 mg IV q12hr.   Dose predicts a ssAUC of 543 mg/L*hr and ssTrough of 16 mg/L.  Monitor renal function closely.  Will check a level in 48 hours or sooner if clinically needed.  Pharmacy will continue to monitor patient and adjust therapy as indicated    VANCOMYCIN CONCENTRATION SCHEDULED FOR 06/08/2024 @ 6 am    Thank you for the consult.  Silva Doll RPH  6/6/2024 12:54 PM

## 2024-06-06 NOTE — PROGRESS NOTES
Spoke with RN concerning the existing MRI orders.  Per RN the patient is adamantly refusing to continue with MRI exams and the existing exams will be cancelled.  RN to notify MD concerning this patients MRI orders as well.

## 2024-06-06 NOTE — PROGRESS NOTES
Cardiology Progress Note    Subjective/Overnight Events:  Patient currently without any active complaint.  Patient strongly desires to go home.    I discussed results of PHILIPPE with him in great detail.  Patient voiced understanding.  Informed him of importance of outpatient antibiotic/antifungal compliance    Patient states that he has no interest in using illicit substances.    Assessment/Plan:  Mitral valve infective endocarditis  -PHILIPPE per below.  -1 out of 4 cultures positive for yeast.  No bacteria noted.  -Infectious disease following.  Antifungal/antibiotic per ID  -Recommend close outpatient follow-up.  IV substance use  -Abuses heroin and fentanyl  -Strongly advised cessation.  Concern for continued use  Electrolyte imbalance  -Recommend replacing potassium and magnesium      PHILIPPE 6/5/2024    Left Ventricle: Normal left ventricular systolic function.    Mitral Valve: Small mobile echodensity noted on the chordae tendineae of the mitral valve. Vegetation cannot be ruled out. Mild regurgitation.    Tricuspid Valve: Valve structure is normal.    Pericardium: No pericardial effusion.     No evidence of endocarditis    Karan Ricardo PA-C 06/06/24 12:37 PM

## 2024-06-06 NOTE — PROGRESS NOTES
Infectious Disease Progress Note  2024   Patient Name: Dayo Ling : 1970   Impression  Severe Sepsis:   Concern for Possible OM/Discitis:  Native MV Culture-Negative Endocarditis:  Hepatitis C:  Possibly culture negative native MV endocarditis as BC have been clear. Suspected due to IV drug use. Concern source may be OM/discitis as he has had severe chronic low back pain, will await MRIs.   No reported allergies to ABX. CrCl 82, Cr baseline of 1.1 presented with 3.6 but has returned to baseline 6/3. Temp staying persistent at 99.1. Leukocytosis 56.6 on dwt to 44.3. Pct >100.    -UA WBC 5, RBC 1, urine culture GNR <10,000 CFU/ml  -BC 0/2 NGTD  -BC 0/2 NGTD  -Hep C RNA: 3.40 detected  -CT Head wo contrast: No acute intracranial findings.   -CT Head Neck W Contrast: Patent intracranial vessels  -PCXR: no acute CP disease  -CT AP wo Contrast: No acute findings in the A or P  -CT Chest wo Contrast: 1. No acute findings.   2. A nonspecific 3 mm left upper lobe pulmonary nodule.   -US Retroperitoneal: trace right perinephric fluid, non-specific. Overall no significant change from earlier the same day.  6/3-MRI Brain wo Contrast: Borderline abnormal study showing minimal nonspecific white matter changes which could be due to remote trauma, microangiopathy, or alternatively   due to the known amphetamine use in the past. No evidence of acute infarct   Past: 2020 from [facility]-Complete TTE: 1. Left ventricle: The cavity size was normal. Wall thickness was normal. Systolic function was at the lower limits of normal. The estimated ejection fraction was in the range of 50% to 55%. Left  ventricular diastolic function parameters were normal.   2. Regional wall motion abnormality: Mild hypokinesis of the basal-mid inferior myocardium.   3. Mitral valve: Cannot exclude vegetation.   4. Right ventricle: The cavity size was dilated. Systolic function was normal.   5.

## 2024-06-06 NOTE — PROGRESS NOTES
Spoke with RN this AM concerning this patients MRI exams, RN will speak with the patient to see if he is willing or is wanting to continue with the MRI exams today.  RN to call MRI dept back with f/u information.

## 2024-06-06 NOTE — PROGRESS NOTES
V2.0  [facility] Hospitalist Progress Note      Name:  Dayo Ling /Age/Sex: 1970  (54 y.o. male)   MRN & CSN:  [id number] & [id number] Encounter Date/Time: 2024 1:28 PM EDT    Location:  [address] [place] [address] PCP: No primary care provider on file.       Hospital Day: 3    Assessment and Plan:   Dayo Ling is a 54 y.o. male  who presents with Endocarditis of mitral valve      Plan:    Sepsis secondary to endocarditis  Suspected mitral valve endocarditis.  Seen on TTE.  Cardiology consulted for PHILIPPE  Infectious disease consulted  IV antibiotics: Vancomycin plus cefepime  Follow-up MRI C/T/L-spine  Blood culture did show yeast and added antifungal    DANNIE  Creatinine 1.7.  Was 1 at last discharge  Received IV fluids    Substance abuse  Patient used fentanyl after leaving AMA on last admission.  Found down.  Continue Suboxone      Diet ADULT DIET; Regular   DVT Prophylaxis [x] Lovenox, []  Heparin, [] SCDs, [] Ambulation,    [] Eliquis, [] Xarelto  [] Coumadin [] other   Code Status Full Code   Disposition From: Home  Expected Disposition: To be determined  Estimated Date of Discharge: To be determined  Patient requires continued admission due to iv ABX   Surrogate Decision Maker/ POA      Personally reviewed Lab Studies and Imaging     Discussed management of the case with infectious disease who recommended IV antibiotics and PHILIPPE    EKG interpreted personally and results no acute ST changes    Drugs that require monitoring for toxicity include Comycin and the method of monitoring was blood level monitoring    Subjective:     Chief Complaint: Heat Exposure (Out mowing all day. Call came in unreponsive but breathing. EMS reports pt laying in road breathing and responding. Sat 100%) and Altered Mental Status     Patient with no acute events overnight.  He states that he is hopeful to get out of the hospital as quickly as possible.  States that it is difficult for him to feel contained, but he knows he needs the

## 2024-06-06 NOTE — PLAN OF CARE
Problem: Discharge Planning  Goal: Discharge to home or other facility with appropriate resources  6/6/2024 0847 by Tricia Marcial RN  Outcome: Progressing  6/5/2024 1953 by Sam Becerra RN  Outcome: Progressing     Problem: Confusion  Goal: Confusion, delirium, dementia, or psychosis is improved or at baseline  Description: INTERVENTIONS:  1. Assess for possible contributors to thought disturbance, including medications, impaired vision or hearing, underlying metabolic abnormalities, dehydration, psychiatric diagnoses, and notify attending LIP  2. [place] high risk fall precautions, as indicated  3. Provide frequent short contacts to provide reality reorientation, refocusing and direction  4. Decrease environmental stimuli, including noise as appropriate  5. Monitor and intervene to maintain adequate nutrition, hydration, elimination, sleep and activity  6. If unable to ensure safety without constant attention obtain sitter and review sitter guidelines with assigned personnel  7. Initiate Psychosocial CNS and Spiritual Care consult, as indicated  6/6/2024 0847 by Tricia Marcila RN  Outcome: Progressing  6/5/2024 1953 by Sam Becerra RN  Outcome: Progressing     Problem: Safety - Adult  Goal: Free from fall injury  6/6/2024 0847 by Trciia Marcial RN  Outcome: Progressing  6/5/2024 1953 by Sam Becerra RN  Outcome: Progressing     Problem: Risk for Elopement  Goal: Patient will not exit the unit/facility without proper excort  6/6/2024 0847 by Tricia Marcial RN  Outcome: Progressing  6/5/2024 1953 by Sam Becerra RN  Outcome: Progressing     Problem: ABCDS Injury Assessment  Goal: Absence of physical injury  6/6/2024 0847 by Tricia Marcial RN  Outcome: Progressing  6/5/2024 1953 by Sam Becerra RN  Outcome: Progressing

## 2024-06-06 NOTE — CONSULTS
CARDIOLOGY CONSULT NOTE   Reason for consultation:  ENDOCARDITIS    Referring physician:  Luanne Calvillo MD     Primary care physician: No primary care provider on file.      Dear   Thanks for the consult.    History of present illness:Dayo is a 54 y.o.year old who  presents with altered mental state, and TTE suggested possible vegetation on mitral valve chordae.pateint has OM or discitis, and blood culture are negative.patient is poor historian, all INFORMATIONS were obtained after review of medical records and discussion with staff.  Chief Complaint   Patient presents with    Heat Exposure     Out mowing all day. Call came in unreponsive but breathing. EMS reports pt laying in road breathing and responding. Sat 100%    Altered Mental Status     Blood pressure, cholesterol, blood glucose and weight are well controlled.    Past medical history:    has a past medical history of Diabetes mellitus (HCC) and Hep C w/o coma, chronic (HCC).  Past surgical history:   has a past surgical history that includes Cholecystectomy; Splenectomy, total; and orthopedic surgery.  Social History:   reports that he has been smoking cigarettes. He has never used smokeless tobacco. He reports current drug use. Drugs: IV and Opiates . He reports that he does not drink alcohol.  Family history:   no family history of CAD, STROKE of DM    Allergies   Allergen Reactions    Ceclor [Cefaclor] Itching    Vicodin [Hydrocodone-Acetaminophen] Other (See Comments)     RED, SWELLING , ITCHING       sodium chloride flush 0.9 % injection 5-40 mL, 2 times per day  sodium chloride flush 0.9 % injection 5-40 mL, PRN  0.9 % sodium chloride infusion, PRN  enoxaparin (LOVENOX) injection 40 mg, Daily  acetaminophen (TYLENOL) tablet 650 mg, Q6H PRN   Or  acetaminophen (TYLENOL) suppository 650 mg, Q6H PRN  ceFEPIme (MAXIPIME) 2,000 mg in sodium chloride 0.9 % 50 mL IVPB (mini-bag), Q12H  buprenorphine-naloxone (SUBOXONE) 8-2 MG SL tablet 1 tablet,  BID  hydrOXYzine HCl (ATARAX) tablet 50 mg, TID PRN  vancomycin (VANCOCIN) 1,500 mg in sodium chloride 0.9 % 250 mL IVPB ([id number]), Q24H      Current Facility-Administered Medications   Medication Dose Route Frequency Provider Last Rate Last Admin    sodium chloride flush 0.9 % injection 5-40 mL  5-40 mL IntraVENous 2 times per day Luanne Calvillo MD   10 mL at 06/05/24 0940    sodium chloride flush 0.9 % injection 5-40 mL  5-40 mL IntraVENous PRN Luanne Calvillo MD        0.9 % sodium chloride infusion   IntraVENous PRN Luanne Calvillo MD 20 mL/hr at 06/05/24 1437 New Bag at 06/05/24 1437    enoxaparin (LOVENOX) injection 40 mg  40 mg SubCUTAneous Daily Luanne Calvillo MD   40 mg at 06/05/24 0927    acetaminophen (TYLENOL) tablet 650 mg  650 mg Oral Q6H PRN Luanne Calvillo MD        Or    acetaminophen (TYLENOL) suppository 650 mg  650 mg Rectal Q6H PRN Luanne Calvillo MD        ceFEPIme (MAXIPIME) 2,000 mg in sodium chloride 0.9 % 50 mL IVPB (mini-bag)  2,000 mg IntraVENous Q12H Luanne Calvillo MD   Stopped at 06/05/24 1838    buprenorphine-naloxone (SUBOXONE) 8-2 MG SL tablet 1 tablet  1 tablet SubLINGual BID Luanne Calvillo MD   1 tablet at 06/05/24 1711    hydrOXYzine HCl (ATARAX) tablet 50 mg  50 mg Oral TID PRN Luanne Calvillo MD        vancomycin (VANCOCIN) 1,500 mg in sodium chloride 0.9 % 250 mL IVPB ([id number])  1,500 mg IntraVENous Q24H Luanne Calvillo MD   Stopped at 06/05/24 1116     Review of Systems:   Constitutional: No Fever or Weight Loss   Eyes: No Decreased Vision  ENT: No Headaches, Hearing Loss or Vertigo  Cardiovascular: No chest pain, dyspnea on exertion, palpitations or loss of consciousness  Respiratory: No cough or wheezing    Gastrointestinal: No abdominal pain, appetite loss, blood in stools, constipation, diarrhea or heartburn  Genitourinary: No dysuria, trouble voiding, or hematuria  Musculoskeletal:  No gait disturbance,

## 2024-06-06 NOTE — PROGRESS NOTES
RENAL DOSE ADJUSTMENT MADE PER P/T PROTOCOL    PREVIOUS ORDER:  Cefepime 2g IV EI Q12hr    Estimated Creatinine Clearance: 79 mL/min (based on SCr of 1.1 mg/dL).  Recent Labs     06/04/24  1341 06/04/24  1857 06/06/24  0234   BUN 13 19 21   CREATININE 1.0 1.7* 1.1    213 169     NEW RENALLY ADJUSTED ORDER:  Cefepime 2g IV EI q8hr    Silva Doll RPH  6/6/2024 2:11 PM

## 2024-06-07 VITALS
TEMPERATURE: 98.1 F | HEIGHT: 71 IN | SYSTOLIC BLOOD PRESSURE: 115 MMHG | OXYGEN SATURATION: 98 % | BODY MASS INDEX: 21.91 KG/M2 | WEIGHT: 156.53 LBS | DIASTOLIC BLOOD PRESSURE: 68 MMHG | HEART RATE: 59 BPM | RESPIRATION RATE: 19 BRPM

## 2024-06-07 LAB
ANION GAP SERPL CALCULATED.3IONS-SCNC: 13 MMOL/L (ref 7–16)
BASOPHILS ABSOLUTE: 0.1 K/CU MM
BASOPHILS RELATIVE PERCENT: 0.4 % (ref 0–1)
BUN SERPL-MCNC: 17 MG/DL (ref 6–23)
CALCIUM SERPL-MCNC: 7.9 MG/DL (ref 8.3–10.6)
CHLORIDE BLD-SCNC: 107 MMOL/L (ref 99–110)
CO2: 20 MMOL/L (ref 21–32)
CREAT SERPL-MCNC: 0.9 MG/DL (ref 0.9–1.3)
CRP SERPL HS-MCNC: 119.7 MG/L
DIFFERENTIAL TYPE: ABNORMAL
EOSINOPHILS ABSOLUTE: 0.5 K/CU MM
EOSINOPHILS RELATIVE PERCENT: 1.7 % (ref 0–3)
GFR, ESTIMATED: >90 ML/MIN/1.73M2
GLUCOSE SERPL-MCNC: 70 MG/DL (ref 70–99)
HAV IGM SERPL QL IA: NON REACTIVE
HBV CORE IGM SERPL QL IA: NON REACTIVE
HBV SURFACE AG SERPL QL IA: NON REACTIVE
HCT VFR BLD CALC: 28.3 % (ref 42–52)
HCV AB SERPL QL IA: ABNORMAL
HEMOGLOBIN: 9.2 GM/DL (ref 13.5–18)
HIV 1+2 AB+HIV1P24 AG SERPLBLD IA.RAPID: NON REACTIVE
IMMATURE NEUTROPHIL %: 1.3 % (ref 0–0.43)
LYMPHOCYTES ABSOLUTE: 3.4 K/CU MM
LYMPHOCYTES RELATIVE PERCENT: 11.4 % (ref 24–44)
MCH RBC QN AUTO: 26.8 PG (ref 27–31)
MCHC RBC AUTO-ENTMCNC: 32.5 % (ref 32–36)
MCV RBC AUTO: 82.5 FL (ref 78–100)
MONOCYTES ABSOLUTE: 1.4 K/CU MM
MONOCYTES RELATIVE PERCENT: 4.7 % (ref 0–4)
NEUTROPHILS ABSOLUTE: 24.1 K/CU MM
NEUTROPHILS RELATIVE PERCENT: 80.5 % (ref 36–66)
NUCLEATED RBC %: 0 %
PDW BLD-RTO: 15.6 % (ref 11.7–14.9)
PLATELET # BLD: 203 K/CU MM (ref 140–440)
PMV BLD AUTO: 12.2 FL (ref 7.5–11.1)
POTASSIUM SERPL-SCNC: 4.4 MMOL/L (ref 3.5–5.1)
PROCALCITONIN SERPL-MCNC: 70.44 NG/ML
RBC # BLD: 3.43 M/CU MM (ref 4.6–6.2)
SED RATE, AUTOMATED: 56 MM/HR (ref 0–20)
SODIUM BLD-SCNC: 140 MMOL/L (ref 135–145)
TOTAL IMMATURE NEUTOROPHIL: 0.4 K/CU MM
TOTAL NUCLEATED RBC: 0 K/CU MM
WBC # BLD: 29.9 K/CU MM (ref 4–10.5)

## 2024-06-07 PROCEDURE — 85025 COMPLETE CBC W/AUTO DIFF WBC: CPT

## 2024-06-07 PROCEDURE — 94761 N-INVAS EAR/PLS OXIMETRY MLT: CPT

## 2024-06-07 PROCEDURE — 36415 COLL VENOUS BLD VENIPUNCTURE: CPT

## 2024-06-07 PROCEDURE — 99233 SBSQ HOSP IP/OBS HIGH 50: CPT | Performed by: NURSE PRACTITIONER

## 2024-06-07 PROCEDURE — 86140 C-REACTIVE PROTEIN: CPT

## 2024-06-07 PROCEDURE — 85652 RBC SED RATE AUTOMATED: CPT

## 2024-06-07 PROCEDURE — 2580000003 HC RX 258: Performed by: INTERNAL MEDICINE

## 2024-06-07 PROCEDURE — 80048 BASIC METABOLIC PNL TOTAL CA: CPT

## 2024-06-07 PROCEDURE — 87389 HIV-1 AG W/HIV-1&-2 AB AG IA: CPT

## 2024-06-07 PROCEDURE — 80074 ACUTE HEPATITIS PANEL: CPT

## 2024-06-07 PROCEDURE — 84145 PROCALCITONIN (PCT): CPT

## 2024-06-07 PROCEDURE — 6360000002 HC RX W HCPCS: Performed by: STUDENT IN AN ORGANIZED HEALTH CARE EDUCATION/TRAINING PROGRAM

## 2024-06-07 PROCEDURE — 6360000002 HC RX W HCPCS: Performed by: INTERNAL MEDICINE

## 2024-06-07 PROCEDURE — 2580000003 HC RX 258: Performed by: STUDENT IN AN ORGANIZED HEALTH CARE EDUCATION/TRAINING PROGRAM

## 2024-06-07 RX ORDER — FLUCONAZOLE 100 MG/1
200 TABLET ORAL DAILY
Qty: 28 TABLET | Refills: 0 | Status: SHIPPED | OUTPATIENT
Start: 2024-06-07 | End: 2024-06-21

## 2024-06-07 RX ORDER — DOXYCYCLINE HYCLATE 100 MG
100 TABLET ORAL 2 TIMES DAILY
Qty: 28 TABLET | Refills: 0 | Status: SHIPPED | OUTPATIENT
Start: 2024-06-07 | End: 2024-06-21

## 2024-06-07 RX ORDER — CEPHALEXIN 250 MG/1
250 CAPSULE ORAL 4 TIMES DAILY
Qty: 56 CAPSULE | Refills: 0 | Status: SHIPPED | OUTPATIENT
Start: 2024-06-07 | End: 2024-06-21

## 2024-06-07 RX ADMIN — CEFEPIME 2000 MG: 2 INJECTION, POWDER, FOR SOLUTION INTRAVENOUS at 08:53

## 2024-06-07 RX ADMIN — MICAFUNGIN SODIUM 100 MG: 100 INJECTION, POWDER, LYOPHILIZED, FOR SOLUTION INTRAVENOUS at 11:05

## 2024-06-07 RX ADMIN — VANCOMYCIN HYDROCHLORIDE 1000 MG: 1 INJECTION, POWDER, LYOPHILIZED, FOR SOLUTION INTRAVENOUS at 08:55

## 2024-06-07 RX ADMIN — BUPRENORPHINE HYDROCHLORIDE AND NALOXONE HYDROCHLORIDE DIHYDRATE 1 TABLET: 8; 2 TABLET SUBLINGUAL at 08:54

## 2024-06-07 RX ADMIN — SODIUM CHLORIDE, PRESERVATIVE FREE 10 ML: 5 INJECTION INTRAVENOUS at 08:56

## 2024-06-07 ASSESSMENT — PAIN SCALES - GENERAL: PAINLEVEL_OUTOF10: 0

## 2024-06-07 NOTE — PLAN OF CARE
Problem: Discharge Planning  Goal: Discharge to home or other facility with appropriate resources  6/6/2024 2021 by Sam Becerra RN  Outcome: Progressing  6/6/2024 0847 by Tricia Marcial RN  Outcome: Progressing     Problem: Confusion  Goal: Confusion, delirium, dementia, or psychosis is improved or at baseline  Description: INTERVENTIONS:  1. Assess for possible contributors to thought disturbance, including medications, impaired vision or hearing, underlying metabolic abnormalities, dehydration, psychiatric diagnoses, and notify attending LIP  2. [place] high risk fall precautions, as indicated  3. Provide frequent short contacts to provide reality reorientation, refocusing and direction  4. Decrease environmental stimuli, including noise as appropriate  5. Monitor and intervene to maintain adequate nutrition, hydration, elimination, sleep and activity  6. If unable to ensure safety without constant attention obtain sitter and review sitter guidelines with assigned personnel  7. Initiate Psychosocial CNS and Spiritual Care consult, as indicated  6/6/2024 2021 by Sam Becerra RN  Outcome: Progressing  6/6/2024 0847 by Tricia Marcial RN  Outcome: Progressing     Problem: Safety - Adult  Goal: Free from fall injury  6/6/2024 2021 by Sam Becerra RN  Outcome: Progressing  6/6/2024 0847 by Tricia Marcial RN  Outcome: Progressing     Problem: Risk for Elopement  Goal: Patient will not exit the unit/facility without proper excort  6/6/2024 2021 by Sam Becerra RN  Outcome: Progressing  6/6/2024 0847 by Tricia Marcial RN  Outcome: Progressing     Problem: ABCDS Injury Assessment  Goal: Absence of physical injury  6/6/2024 2021 by Sam Becerra RN  Outcome: Progressing  6/6/2024 0847 by Tricia Marcial RN  Outcome: Progressing     Problem: Chronic Conditions and Co-morbidities  Goal: Patient's chronic conditions and co-morbidity symptoms are monitored and maintained or

## 2024-06-07 NOTE — DISCHARGE SUMMARY
V2.0  AMA discharge Summary    Name:  Dayo Ling /Age/Sex: 1970 (54 y.o. male)   Admit Date: 2024  Discharge Date: 24    MRN & CSN:  [id number] & [id number] Encounter Date and Time 24 12:40 PM EDT    Attending:  Andrea Banda,* Discharging Provider: Andrea Banda MD       Hospital Course:     Brief HPI: Dayo Ling is a 54 y.o. male   with fentanyl abuse and dependence who was recently admitted to the hospital (-2024), left AMA.  Patient was found unresponsive at the roadside and a bystander called EMS.  As per information patient was minimally responsive, was responding, appeared confused.  EMS was unable to get a pulse ox on the patient.  Patient was brought to the ER.  Patient is currently awake, alert, oriented x 3.  Patient reported that he felt anxious in the hospital, and hence left AMA.  But patient promises not to leave again until the treatment is completed.  Patient initially denied using any drugs, but later states that he used fentanyl.  Could not be clear how he got the fentanyl.  Patient denied using any other drugs.  Currently reports being back to baseline but complaining of back pain, chills, epigastric pain.  Being any chest pain, shortness of breath.  Vitals on arrival-/137, , RR 37, temp 98.9, saturating 96% on supplemental oxygen.  Labs were significant for CO2 14, creatinine 1.7, anion gap 21, lactic acid 7.5, magnesium 0.9, random glucose 118, procalcitonin 21.59, proBNP 24 596, troponin 37, 67, LFTs within normal range, urine drug screen positive for fentanyl.  Hemoglobin 11.2.  Respiratory viral panel negative.  Blood cultures drawn in ED.  UA not suggestive of infection.  CT head, CT C-spine-no acute process.  Chest x-ray-no acute process.    On day of discharge patient stated that he was too anxious to remain at the hospital and wanted to sign out AMA, but still pursue treatment as an outpatient.  We discussed that he  significant change from earlier the same day. Electronically signed by Thee Ayala MD    CT ABDOMEN PELVIS WO CONTRAST Additional Contrast? None    Result Date: 6/1/2024  INDICATION: Drug overdose. Elevated creatinine. Sepsis. COMPARISON: None. TECHNIQUE: Helically-acquired axial images were obtained through the chest, abdomen, and pelvis. Multiplanar reformats were reconstructed. Up-to-date CT equipment and radiation dose reduction techniques were employed. IV Contrast: None. Oral Contrast: None. CHEST: FINDINGS: LUNGS AND AIRWAYS: Minimal dependent subsegmental atelectasis. Mild upper lobe predominant paraseptal emphysema. 3 mm subpleural pulmonary nodule in the left upper lobe (series 303 image 42). Lungs otherwise clear. Central airways are patent. PLEURA: No pleural effusion or thickening. HEART AND GREAT VESSELS: Normal heart size. No pericardial effusion. Scattered coronary artery calcification consistent with coronary artery disease.  Thoracic aorta and main pulmonary artery are normal in caliber. MEDIASTINUM: No lymphadenopathy. CHEST WALL / LOWER NECK: No significant abnormality. BONES: No significant abnormality.     1. No acute findings. 2. A nonspecific 3 mm left upper lobe pulmonary nodule. Lung Nodule Actionable Findings on Non Lung Screenings: SINGLE Pulmonary Nodule <6mm, Solid, Solitary - Following the Fleischner Society 2017 guidelines for single solid nodules < 6mm, low risk patients: no follow-up suggested. If suspicious morphology or upper lobe location, consider 12 month follow-up. High risk patients: optional CT in 12 months.   qzxv ABDOMEN AND PELVIS: FINDINGS: LIVER: Normal. GALLBLADDER AND BILIARY TREE: Previous cholecystectomy.  No biliary dilatation. PANCREAS: Not well visualized on noncontrast CT. No acute abnormality. SPLEEN: Previous splenectomy. ADRENAL GLANDS: Normal. KIDNEYS AND URETERS: A cyst enhancement and excretion of contrast by both kidneys related to a previous contrast  PM    LEUKOCYTESUR NEGATIVE 06/04/2024 07:30 PM    UROBILINOGEN 0.2 06/04/2024 07:30 PM    BILIRUBINUR NEGATIVE 06/04/2024 07:30 PM    BLOODU MODERATE NUMBER OR AMOUNT OBSERVED 06/04/2024 07:30 PM    GLUCOSEU NEGATIVE 06/04/2024 07:30 PM    KETUA TRACE 06/04/2024 07:30 PM     Urine Cultures: No results found for: \"LABURIN\"  Blood Cultures: No results found for: \"BC\"  No results found for: \"BLOODCULT2\"  Organism:   Lab Results   Component Value Date/Time    ORG MRSA 06/01/2013 01:00 AM       Comment: Please note this report has been produced using speech recognition software and may contain errors related to that system including errors in grammar, punctuation, and spelling, as well as words and phrases that may be inappropriate. If there are any questions or concerns please feel free to contact the dictating provider for clarification.     Time Spent Discharging patient 35 minutes    Electronically signed by Andrea Banda MD on 6/7/2024 at 12:40 PM

## 2024-06-07 NOTE — PROGRESS NOTES
Infectious Disease Progress Note  2024   Patient Name: Dayo Ling : 1970   Impression  Severe Sepsis:   Candidemia:  Concern for Possible OM/Discitis:  Native MV Endocarditis:  Hepatitis C:  Possibly culture negative native MV endocarditis as BC have been clear. Suspected due to IV drug use. Concern source may be OM/discitis as he has had severe chronic low back pain, will await MRIs.   No reported allergies to ABX. CrCl 82, Cr baseline of 1.1 presented with 3.6 but has returned to baseline 6/3. Temp staying persistent at 99.1. Leukocytosis 56.6 on dwt to 44.3. Pct >100.    -UA WBC 5, RBC 1, urine culture GNR <10,000 CFU/ml  -BC 0/2 NGTD  -BC 0/2  Yeast (spp and sensi pending)  -Hep C RNA: 3.40 detected  -CT Head wo contrast: No acute intracranial findings.   -CT Head Neck W Contrast: Patent intracranial vessels  -PCXR: no acute CP disease  -CT AP wo Contrast: No acute findings in the A or P  -CT Chest wo Contrast: 1. No acute findings.   2. A nonspecific 3 mm left upper lobe pulmonary nodule.   -US Retroperitoneal: trace right perinephric fluid, non-specific. Overall no significant change from earlier the same day.  6/3-MRI Brain wo Contrast: Borderline abnormal study showing minimal nonspecific white matter changes which could be due to remote trauma, microangiopathy, or alternatively   due to the known amphetamine use in the past. No evidence of acute infarct   Past: 2020 from [facility]-Complete TTE: 1. Left ventricle: The cavity size was normal. Wall thickness was normal. Systolic function was at the lower limits of normal. The estimated ejection fraction was in the range of 50% to 55%. Left  ventricular diastolic function parameters were normal.   2. Regional wall motion abnormality: Mild hypokinesis of the basal-mid inferior myocardium.   3. Mitral valve: Cannot exclude vegetation.   4. Right ventricle: The cavity size was dilated. Systolic function  manage or resolve his diagnoses.   EFE Murray the plan of care    Ongoing Antimicrobial Therapy  Vancomycin 6/1-4, 6/5-  Cefepime 6/1-4, 6/5-?  Micafungin 6/6-  Completed Antimicrobial Therapy  Zosyn 6/4? ?  History:?Interval history noted  Denies n/v/d/f or untoward effects of antibiotics  Physical Exam:  Vital Signs: /68   Pulse 59   Temp 98.1 °F (36.7 °C) (Oral)   Resp 19   Ht 1.803 m (5' 11\")   Wt 71 kg (156 lb 8.4 oz)   SpO2 98%   BMI 21.83 kg/m²     Gen: alert and oriented  Wounds: C/D/I-no open areas, has scattered track marks on upper extremities with no acute issues (no edema, erythema or drainage)  HEMT: AT/NC Oropharynx pink, moist, and without lesions or exudates; edentulous.   Eyes: PERRLA, EOMI, conjunctiva pink, sclera anicteric.   Neck: Supple. Trachea midline. No LAD.  Chest: no distress and CTA. Good air movement. Room air.   Back: low back pain constant and worsening with light palpation  Heart: NSR and no MRG.   Abd: soft, non-distended, no tenderness, no hepatomegaly. Normoactive bowel sounds.  Ext: no clubbing, cyanosis, or edema  Neuro: Mental status intact. CN 2-12 intact and no focal sensory or motor deficits     Radiologic / Imaging / TESTING  6/1/2024 CT Head WO Contrast:  IMPRESSION:1. No acute intracranial findings.??     6/1/2024 CTA Head Neck W Contrast:  IMPRESSION:1. Patent intracranial vessels.     6/1/2024 XR Chest Portable;  Impression:1. No acute cardiopulmonary disease.     6/1/2024 CT Chest WO Contrast:  IMPRESSION:1. No acute findings in the abdomen or pelvis.     6/1/2024 CT Abdomen Pelvis WO Contrast:  IMPRESSION:1. No acute findings in the abdomen or pelvis.     6/1/2024 US Retroperitoneal Limited:  IMPRESSION:1. Trace right perinephric fluid, nonspecific..  Overall no significant change from earlier the same day.     6/3/2024 MRI Brain wo Contrast:  IMPRESSION:  Borderline abnormal study showing minimal nonspecific white matter changes which could be due to

## 2024-06-07 NOTE — PROGRESS NOTES
Outpatient Pharmacy Progress Note for Meds-to-Beds    Total number of Prescriptions Filled: 5    Additional Documentation:  Medication(s) were delivered to the patient's room prior to discharge      Thank you for letting us serve your patients.  [address] [address]    Phone: [phone]    Fax: [phone]

## 2024-06-07 NOTE — PROGRESS NOTES
Physician Progress Note      PATIENT:               NATHAN CARCAMO  [facility] #:                  [id number]  :                       1970  ADMIT DATE:       2024 6:27 PM  DISCH DATE:  RESPONDING  PROVIDER #:        KIMBER MONTES          QUERY TEXT:    Patient admitted with sepsis. Noted documentation of acute respiratory failure   in progress notes. In order to support the diagnosis of acute respiratory   failure, please include additional clinical indicators in your documentation.    Or please document if the diagnosis of acute respiratory failure has been   ruled out after further study.    The medical record reflects the following:  Risk Factors: Sepsis, opioid abuse, acidosis  Clinical Indicators: Per ED notes \"EMS says that they found him breathing on   his own. Apparently EMS had trouble getting a accurate pulse ox but said that   it was around 90 or so they put him on supplemental oxygen regardless.   Tachypneic but no significant increased work of breathing.  No respiratory   distress.  Lungs clear to auscultation.  Pulse ox % on room air.    Supplemental oxygen up to 4L, RR up to 37  Treatment: Supplemental oxygen, CXR, serial labs, IV antibiotics, supportive   care    Acute Respiratory Failure Clinical Indicators per [employer]-DR[employer] Training Guide and   Quick Reference Guide:  pO2 < 60 mmHg or SpO2 (pulse oximetry) < 91% breathing room air  pCO2 > 50 and pH < 7.35  P/F ratio (pO2 / FIO2) < 300  pO2 decrease or pCO2 increase by 10 mmHg from baseline (if known)  Supplemental oxygen of 40% or more  Presence of respiratory distress, tachypnea, dyspnea, shortness of breath,   wheezing  Unable to speak in complete sentences  Use of accessory muscles to breathe  Extreme anxiety and feeling of impending doom  Tripod position  Confusion/altered mental status/obtunded  Options provided:  -- Acute Respiratory Failure as evidenced by, Please document evidence.  -- Acute Respiratory Failure ruled out  after study  -- Other - I will add my own diagnosis  -- Disagree - Not applicable / Not valid  -- Disagree - Clinically unable to determine / Unknown  -- Refer to Clinical Documentation Reviewer    PROVIDER RESPONSE TEXT:    Acute Respiratory Failure has been ruled out after study.    Query created by: Liz Batista on 6/6/2024 12:24 PM      QUERY TEXT:    Pt admitted with sepsis. Pt noted to have AMS. If possible, please document in   the progress notes and discharge summary if you are evaluating and / or   treating any of the following:      The medical record reflects the following:  Risk Factors: Sepsis, Opioid abuse, diabetes, DANNIE  Clinical Indicators: Per ED consult note \" presents to the emergency   department for evaluation of altered mental status.  History is limited due to   altered mental status.  Apparently  a concerned citizen saw the patient later   roadway and called EMS\". Per H&P \"Patient admits to using fentanyl after   leaving AMA\".  Treatment: IV antibiotics, serial labs, Head CT, supportive care    Thank you,  Liz Batista RN BSN  Options provided:  -- Metabolic encephalopathy due to sepsis  -- Toxic encephalopathy  -- Other - I will add my own diagnosis  -- Disagree - Not applicable / Not valid  -- Disagree - Clinically unable to determine / Unknown  -- Refer to Clinical Documentation Reviewer    PROVIDER RESPONSE TEXT:    This patient has toxic encephalopathy.    Query created by: Liz Batista on 6/6/2024 12:29 PM      Electronically signed by:  KIMBER MONTES 6/7/2024 12:57 PM

## 2024-06-09 LAB
CULTURE: NORMAL
Lab: NORMAL
SPECIMEN: NORMAL

## 2024-06-09 NOTE — PROGRESS NOTES
Note originally mislabeled as H&P, recopied and pasted here as a progress note     Date of Service: 2024  1:27 PM     Signed       Expand All Collapse All         V2.0     [facility] Progress Note                            Name:  Dayo Ling /Age/Sex: 1970  (54 y.o. male)   MRN & CSN:  [id number] & [id number] Encounter Date/Time: 2024 1:27 PM EDT   Location:  -A PCP: No primary care provider on file.     Attending:Sunil Osborne MD         Hospital Day: 2     Assessment and Recommendations   Dayo Ling is a 54 y.o. male        -WBC increased to 56.6 today  -Cr improved to 1.6 today  -Blood cx pending when I last checked at 1:30 pm  -Vomited this am  -See nursing notes - had some aggressive behavior, will consult psychiatry  -Has had 20 mg Subutex between 0318 and 1253        Dayo Ling is a 54 y.o. male with a pmh of hepatitis C who presented to ED on  with blurred vision and left lower extremity weakness that started 1 hour earlier.  -When seen by the ED physician she felt that he may have overdosed.  He responded to Narcan.     Severe sepsis  -White count 45.8 in ED with 16% bands  -Blood cultures sent to rule out bacteremia in the setting of IV drug use  -IV cefepime  -IV vancomycin     Hypotension  -Received 4 L of fluid in the ED, with good response     PWID -actively using heroin and fentanyl     Opiate overdose  -Responded to Narcan in ED     Acute kidney injury  -Creatinine 3.8 upon arrival to ED     History of hepatitis C  -Treated in the past, and patient thinks he has it again because he stated he is injecting again     Abnormal UA with 5 WBCs     Left lower extremity weakness upon admission  -Check MRI  -Start aspirin and Lipitor  -Stroke alert called in ED, and will follow [facility]'s recommendations     Left upper lobe pulmonary nodule-3 mm-seen on CT chest done in ED  -Suggest 12-month follow-up     Tobacco use  -Does not desire a nicotine patch     Opiate  neck soft tissue mass or lymphadenopathy. VISUALIZED MEDIASTINUM: No mass or lymphadenopathy. VISUALIZED LUNG APICES: Mild paraseptal emphysema. BONES: No destructive osseous process. OTHER: None.      1. Patent carotid and vertebral arteries without stenosis or acute abnormality. CTA HEAD: FINDINGS: ANTERIOR CIRCULATION: The intracranial internal carotid arteries, anterior cerebral arteries, and middle cerebral arteries demonstrate no occlusion or stenosis. No evidence for aneurysm or arteriovenous malformation. Hypoplastic A1 segment of the left CHRIS with patent anterior communicating artery. POSTERIOR CIRCULATION: The bilateral vertebral arteries, basilar artery and posterior cerebral arteries demonstrate no occlusion or stenosis. No evidence for aneurysm or arteriovenous malformation. Left posterior communicating artery is present. No right  posterior communicating artery. INTRACRANIAL VENOUS SYSTEM: No evidence for intracranial venous thrombosis. IMPRESSION: 1. Patent intracranial vessels. Electronically signed by Bryant Leyva MD     CT HEAD WO CONTRAST     Result Date: 6/1/2024  INDICATION: Stroke symptoms. COMPARISON: None. TECHNIQUE: Helically acquired axial images were obtained from the base of skull to the vertex with multiplanar reformats. Up-to-date CT equipment and radiation dose reduction techniques were employed. IV Contrast: None. FINDINGS: BRAIN: The brain is normal in attenuation. Gray-white differentiation is preserved. There is no intracranial hemorrhage. The ventricles and basal cisterns are normal in size and configuration for age. ORBITS: Normal. EXTRACRANIAL SOFT TISSUES: Normal. SINUSES AND MASTOIDS: Clear. SKULL: No destructive osseous process or fracture.      1. No acute intracranial findings. Electronically signed by Bryant Leyva MD        CBC:        Recent Labs     06/01/24  1221 06/02/24  0327   WBC 45.8* 56.6*   HGB 11.2* 9.2*    269      BMP:          Recent Labs

## 2024-06-18 LAB
CULTURE: ABNORMAL
CULTURE: ABNORMAL
Lab: ABNORMAL
SPECIMEN: ABNORMAL

## 2024-08-31 ENCOUNTER — HOSPITAL ENCOUNTER (EMERGENCY)
Age: 54
Discharge: HOME | End: 2024-08-31
Payer: COMMERCIAL

## 2024-08-31 ENCOUNTER — APPOINTMENT (OUTPATIENT)
Dept: GENERAL RADIOLOGY | Age: 54
End: 2024-08-31
Payer: COMMERCIAL

## 2024-08-31 VITALS
SYSTOLIC BLOOD PRESSURE: 114 MMHG | HEART RATE: 50 BPM | OXYGEN SATURATION: 97 % | RESPIRATION RATE: 13 BRPM | DIASTOLIC BLOOD PRESSURE: 77 MMHG | TEMPERATURE: 97.8 F

## 2024-08-31 DIAGNOSIS — R42 DIZZINESS: Primary | ICD-10-CM

## 2024-08-31 LAB
ALBUMIN SERPL-MCNC: 4.1 GM/DL (ref 3.4–5)
ALP BLD-CCNC: 122 IU/L (ref 40–128)
ALT SERPL-CCNC: 11 U/L (ref 10–40)
ANION GAP SERPL CALCULATED.3IONS-SCNC: 11 MMOL/L (ref 7–16)
AST SERPL-CCNC: 15 IU/L (ref 15–37)
BASOPHILS ABSOLUTE: 0.1 K/CU MM
BASOPHILS RELATIVE PERCENT: 1.1 % (ref 0–1)
BILIRUB SERPL-MCNC: 0.2 MG/DL (ref 0–1)
BILIRUBIN, URINE: NEGATIVE MG/DL
BLOOD, URINE: NEGATIVE
BUN SERPL-MCNC: 16 MG/DL (ref 6–23)
CALCIUM SERPL-MCNC: 9.5 MG/DL (ref 8.3–10.6)
CHLORIDE BLD-SCNC: 97 MMOL/L (ref 99–110)
CLARITY, UA: CLEAR
CO2: 24 MMOL/L (ref 21–32)
COLOR, UA: YELLOW
COMMENT UA: NORMAL
CREAT SERPL-MCNC: 0.9 MG/DL (ref 0.9–1.3)
DIFFERENTIAL TYPE: ABNORMAL
EOSINOPHILS ABSOLUTE: 0.3 K/CU MM
EOSINOPHILS RELATIVE PERCENT: 4.4 % (ref 0–3)
GFR, ESTIMATED: >90 ML/MIN/1.73M2
GLUCOSE SERPL-MCNC: 87 MG/DL (ref 70–99)
GLUCOSE URINE: NEGATIVE MG/DL
HCT VFR BLD CALC: 37.1 % (ref 42–52)
HEMOGLOBIN: 12 GM/DL (ref 13.5–18)
IMMATURE NEUTROPHIL %: 0.1 % (ref 0–0.43)
KETONES, URINE: NEGATIVE MG/DL
LACTIC ACID, SEPSIS: 1 MMOL/L (ref 0.4–2)
LEUKOCYTE ESTERASE, URINE: NEGATIVE
LYMPHOCYTES ABSOLUTE: 3.3 K/CU MM
LYMPHOCYTES RELATIVE PERCENT: 45.1 % (ref 24–44)
MCH RBC QN AUTO: 27.3 PG (ref 27–31)
MCHC RBC AUTO-ENTMCNC: 32.3 % (ref 32–36)
MCV RBC AUTO: 84.5 FL (ref 78–100)
MONOCYTES ABSOLUTE: 1.1 K/CU MM
MONOCYTES RELATIVE PERCENT: 14.6 % (ref 0–4)
NEUTROPHILS ABSOLUTE: 2.5 K/CU MM
NEUTROPHILS RELATIVE PERCENT: 34.7 % (ref 36–66)
NITRITE URINE, QUANTITATIVE: NEGATIVE
NUCLEATED RBC %: 0 %
PDW BLD-RTO: 15.2 % (ref 11.7–14.9)
PH, URINE: 6 (ref 5–8)
PLATELET # BLD: 329 K/CU MM (ref 140–440)
PMV BLD AUTO: 10.3 FL (ref 7.5–11.1)
POTASSIUM SERPL-SCNC: 4.1 MMOL/L (ref 3.5–5.1)
PRO-BNP: 173.1 PG/ML
PROTEIN UA: NEGATIVE MG/DL
RBC # BLD: 4.39 M/CU MM (ref 4.6–6.2)
SODIUM BLD-SCNC: 132 MMOL/L (ref 135–145)
SPECIFIC GRAVITY UA: 1.02 (ref 1–1.03)
TOTAL IMMATURE NEUTOROPHIL: 0.01 K/CU MM
TOTAL NUCLEATED RBC: 0 K/CU MM
TOTAL PROTEIN: 8 GM/DL (ref 6.4–8.2)
TROPONIN, HIGH SENSITIVITY: <6 NG/L (ref 0–22)
UROBILINOGEN, URINE: 0.2 MG/DL (ref 0.2–1)
WBC # BLD: 7.3 K/CU MM (ref 4–10.5)

## 2024-08-31 PROCEDURE — 83605 ASSAY OF LACTIC ACID: CPT

## 2024-08-31 PROCEDURE — 87040 BLOOD CULTURE FOR BACTERIA: CPT

## 2024-08-31 PROCEDURE — 80053 COMPREHEN METABOLIC PANEL: CPT

## 2024-08-31 PROCEDURE — 99285 EMERGENCY DEPT VISIT HI MDM: CPT

## 2024-08-31 PROCEDURE — 83880 ASSAY OF NATRIURETIC PEPTIDE: CPT

## 2024-08-31 PROCEDURE — 84484 ASSAY OF TROPONIN QUANT: CPT

## 2024-08-31 PROCEDURE — 85025 COMPLETE CBC W/AUTO DIFF WBC: CPT

## 2024-08-31 PROCEDURE — 71045 X-RAY EXAM CHEST 1 VIEW: CPT

## 2024-08-31 PROCEDURE — 81003 URINALYSIS AUTO W/O SCOPE: CPT

## 2024-09-01 LAB
CULTURE: NORMAL
CULTURE: NORMAL
EKG ATRIAL RATE: 59 BPM
EKG DIAGNOSIS: NORMAL
EKG P AXIS: 77 DEGREES
EKG P-R INTERVAL: 174 MS
EKG Q-T INTERVAL: 422 MS
EKG QRS DURATION: 98 MS
EKG QTC CALCULATION (BAZETT): 417 MS
EKG R AXIS: 64 DEGREES
EKG T AXIS: 67 DEGREES
EKG VENTRICULAR RATE: 59 BPM
Lab: NORMAL
Lab: NORMAL
SPECIMEN: NORMAL
SPECIMEN: NORMAL

## 2024-09-01 NOTE — ED PROVIDER NOTES
[facility] EMERGENCY DEPARTMENT  EMERGENCY DEPARTMENT ENCOUNTER        Pt Name: Dayo Ling  MRN: [id number]  Birthdate 1970  Date of evaluation: 8/31/2024  Provider: FIFI FRANCISCO CNP  PCP: No primary care provider on file.    ALBA. I have evaluated this patient.        Triage CHIEF COMPLAINT       Chief Complaint   Patient presents with    Dizziness     Patient reports 1 month ago he had a blood infection in his heart and he left the hospital AMA.  Returns today for treatment and reports he is dizzy.         HISTORY OF PRESENT ILLNESS      Chief Complaint: Dizziness    Dayo Ling is a 54 y.o. male who presents for evaluation of intermittent episodes of dizziness.  Patient states that this has been going on for some time but worse over the last few days.  He notices it gets worse with activity at times.  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  He was out in the heat.  He admits that he has a history of IV drug use, actively using fentanyl.  He was diagnosed with endocarditis a couple of months ago and states he was admitted in the hospital with IV antibiotics and got antsy and left AMA.  He never followed up with cardiology because he relapsed and has been using again.  He became alarmed as he was having dizziness prior to that hospitalization and well and came in for evaluation today.  He denies any chest pain or shortness of breath.  He has not had any palpitations.  He denies any fevers, body aches or any sense of general malaise.    Nursing Notes were all reviewed and agreed with or any disagreements were addressed in the HPI.    REVIEW OF SYSTEMS     Pertinent ROS as noted in HPI.      PAST MEDICAL HISTORY     Past Medical History:   Diagnosis Date    Diabetes mellitus (HCC)     Hep C w/o coma, chronic (HCC)        SURGICAL HISTORY     Past Surgical History:   Procedure Laterality

## 2024-09-04 LAB
EKG ATRIAL RATE: 59 BPM
EKG DIAGNOSIS: NORMAL
EKG P AXIS: 77 DEGREES
EKG P-R INTERVAL: 174 MS
EKG Q-T INTERVAL: 422 MS
EKG QRS DURATION: 98 MS
EKG QTC CALCULATION (BAZETT): 417 MS
EKG R AXIS: 64 DEGREES
EKG T AXIS: 67 DEGREES
EKG VENTRICULAR RATE: 59 BPM

## 2024-09-05 LAB
CULTURE: NORMAL
CULTURE: NORMAL
Lab: NORMAL
Lab: NORMAL
SPECIMEN: NORMAL
SPECIMEN: NORMAL

## 2024-12-28 PROCEDURE — 99285 EMERGENCY DEPT VISIT HI MDM: CPT

## 2024-12-28 PROCEDURE — 93005 ELECTROCARDIOGRAM TRACING: CPT | Performed by: STUDENT IN AN ORGANIZED HEALTH CARE EDUCATION/TRAINING PROGRAM

## 2024-12-28 ASSESSMENT — PAIN - FUNCTIONAL ASSESSMENT: PAIN_FUNCTIONAL_ASSESSMENT: NONE - DENIES PAIN

## 2024-12-29 ENCOUNTER — APPOINTMENT (OUTPATIENT)
Dept: CT IMAGING | Age: 54
End: 2024-12-29
Payer: COMMERCIAL

## 2024-12-29 ENCOUNTER — HOSPITAL ENCOUNTER (OUTPATIENT)
Age: 54
Setting detail: OBSERVATION
Discharge: LEFT BEFORE DISCHARGE | End: 2024-12-31
Attending: STUDENT IN AN ORGANIZED HEALTH CARE EDUCATION/TRAINING PROGRAM | Admitting: STUDENT IN AN ORGANIZED HEALTH CARE EDUCATION/TRAINING PROGRAM
Payer: COMMERCIAL

## 2024-12-29 DIAGNOSIS — R55 SYNCOPE AND COLLAPSE: Primary | ICD-10-CM

## 2024-12-29 DIAGNOSIS — R55 SYNCOPE, UNSPECIFIED SYNCOPE TYPE: Primary | ICD-10-CM

## 2024-12-29 LAB
ABSOLUTE BANDS: 0.09 K/UL
ALBUMIN SERPL-MCNC: 3.4 G/DL (ref 3.4–5)
ALBUMIN/GLOB SERPL: 1.2 {RATIO} (ref 1.1–2.2)
ALP SERPL-CCNC: 81 U/L (ref 40–129)
ALT SERPL-CCNC: 5 U/L (ref 10–40)
AMPHET UR QL SCN: NEGATIVE
ANION GAP SERPL CALCULATED.3IONS-SCNC: 11 MMOL/L (ref 9–17)
ANION GAP SERPL CALCULATED.3IONS-SCNC: 8 MMOL/L (ref 9–17)
AST SERPL-CCNC: 11 U/L (ref 15–37)
BANDS: 1 %
BARBITURATES UR QL SCN: NEGATIVE
BASOPHILS # BLD: 0.07 K/UL
BASOPHILS # BLD: 0.09 K/UL
BASOPHILS NFR BLD: 1 % (ref 0–1)
BASOPHILS NFR BLD: 1 % (ref 0–1)
BENZODIAZ UR QL: NEGATIVE
BILIRUB SERPL-MCNC: 0.2 MG/DL (ref 0–1)
BILIRUB UR QL STRIP: NEGATIVE
BNP SERPL-MCNC: 263 PG/ML (ref 0–125)
BUN SERPL-MCNC: 11 MG/DL (ref 7–20)
BUN SERPL-MCNC: 16 MG/DL (ref 7–20)
CALCIUM SERPL-MCNC: 9 MG/DL (ref 8.3–10.6)
CALCIUM SERPL-MCNC: 9 MG/DL (ref 8.3–10.6)
CANNABINOIDS UR QL SCN: NEGATIVE
CHLORIDE SERPL-SCNC: 101 MMOL/L (ref 99–110)
CHLORIDE SERPL-SCNC: 108 MMOL/L (ref 99–110)
CLARITY UR: CLEAR
CO2 SERPL-SCNC: 25 MMOL/L (ref 21–32)
CO2 SERPL-SCNC: 26 MMOL/L (ref 21–32)
COCAINE UR QL SCN: POSITIVE
COLOR UR: YELLOW
COMMENT: ABNORMAL
CREAT SERPL-MCNC: 0.8 MG/DL (ref 0.9–1.3)
CREAT SERPL-MCNC: 0.9 MG/DL (ref 0.9–1.3)
EKG ATRIAL RATE: 65 BPM
EKG DIAGNOSIS: NORMAL
EKG P AXIS: 69 DEGREES
EKG P-R INTERVAL: 154 MS
EKG Q-T INTERVAL: 394 MS
EKG QRS DURATION: 92 MS
EKG QTC CALCULATION (BAZETT): 409 MS
EKG R AXIS: 68 DEGREES
EKG T AXIS: 68 DEGREES
EKG VENTRICULAR RATE: 65 BPM
EOSINOPHIL # BLD: 0.09 K/UL
EOSINOPHIL # BLD: 0.19 K/UL
EOSINOPHILS RELATIVE PERCENT: 1 % (ref 0–3)
EOSINOPHILS RELATIVE PERCENT: 2 % (ref 0–3)
ERYTHROCYTE [DISTWIDTH] IN BLOOD BY AUTOMATED COUNT: 15.5 % (ref 11.7–14.9)
ERYTHROCYTE [DISTWIDTH] IN BLOOD BY AUTOMATED COUNT: 15.6 % (ref 11.7–14.9)
ETHANOLAMINE SERPL-MCNC: <10 MG/DL (ref 0–0.08)
FENTANYL UR QL: POSITIVE
GFR, ESTIMATED: 83 ML/MIN/1.73M2
GFR, ESTIMATED: >90 ML/MIN/1.73M2
GLUCOSE SERPL-MCNC: 110 MG/DL (ref 74–99)
GLUCOSE SERPL-MCNC: 129 MG/DL (ref 74–99)
GLUCOSE UR STRIP-MCNC: NEGATIVE MG/DL
HCT VFR BLD AUTO: 31.2 % (ref 42–52)
HCT VFR BLD AUTO: 31.7 % (ref 42–52)
HGB BLD-MCNC: 10.4 G/DL (ref 13.5–18)
HGB BLD-MCNC: 10.4 G/DL (ref 13.5–18)
HGB UR QL STRIP.AUTO: NEGATIVE
IMM GRANULOCYTES # BLD AUTO: 0.02 K/UL
IMM GRANULOCYTES NFR BLD: 0 %
INR PPP: 1
KETONES UR STRIP-MCNC: NEGATIVE MG/DL
LACTATE BLDV-SCNC: 2 MMOL/L (ref 0.4–2)
LEUKOCYTE ESTERASE UR QL STRIP: NEGATIVE
LYMPHOCYTES NFR BLD: 2.26 K/UL
LYMPHOCYTES NFR BLD: 2.97 K/UL
LYMPHOCYTES RELATIVE PERCENT: 24 % (ref 24–44)
LYMPHOCYTES RELATIVE PERCENT: 30 % (ref 24–44)
MAGNESIUM SERPL-MCNC: 1.9 MG/DL (ref 1.8–2.4)
MCH RBC QN AUTO: 27 PG (ref 27–31)
MCH RBC QN AUTO: 27.4 PG (ref 27–31)
MCHC RBC AUTO-ENTMCNC: 32.8 G/DL (ref 32–36)
MCHC RBC AUTO-ENTMCNC: 33.3 G/DL (ref 32–36)
MCV RBC AUTO: 82.3 FL (ref 78–100)
MCV RBC AUTO: 82.3 FL (ref 78–100)
MONOCYTES NFR BLD: 0.66 K/UL
MONOCYTES NFR BLD: 1.63 K/UL
MONOCYTES NFR BLD: 16 % (ref 0–4)
MONOCYTES NFR BLD: 7 % (ref 0–4)
NEUTROPHILS NFR BLD: 51 % (ref 36–66)
NEUTROPHILS NFR BLD: 66 % (ref 36–66)
NEUTS SEG NFR BLD: 5.13 K/UL
NEUTS SEG NFR BLD: 6.2 K/UL
NITRITE UR QL STRIP: NEGATIVE
OPIATES UR QL SCN: NEGATIVE
OXYCODONE UR QL SCN: NEGATIVE
PH UR STRIP: 6.5 [PH] (ref 5–8)
PLATELET # BLD AUTO: 184 K/UL (ref 140–440)
PLATELET # BLD AUTO: 228 K/UL (ref 140–440)
PLATELET ESTIMATE: ABNORMAL
PMV BLD AUTO: 11.3 FL (ref 7.5–11.1)
PMV BLD AUTO: 11.6 FL (ref 7.5–11.1)
POTASSIUM SERPL-SCNC: 3.8 MMOL/L (ref 3.5–5.1)
POTASSIUM SERPL-SCNC: 4.1 MMOL/L (ref 3.5–5.1)
PROCALCITONIN SERPL-MCNC: 0.64 NG/ML
PROT SERPL-MCNC: 6.3 G/DL (ref 6.4–8.2)
PROT UR STRIP-MCNC: NEGATIVE MG/DL
PROTHROMBIN TIME: 13.6 SEC (ref 11.7–14.5)
RBC # BLD AUTO: 3.79 M/UL (ref 4.6–6.2)
RBC # BLD AUTO: 3.85 M/UL (ref 4.6–6.2)
RBC # BLD: NORMAL 10*6/UL
SODIUM SERPL-SCNC: 137 MMOL/L (ref 136–145)
SODIUM SERPL-SCNC: 141 MMOL/L (ref 136–145)
SP GR UR STRIP: <1.005 (ref 1–1.03)
TEST INFORMATION: ABNORMAL
TROPONIN I SERPL HS-MCNC: <6 NG/L (ref 0–22)
TROPONIN I SERPL HS-MCNC: <6 NG/L (ref 0–22)
TSH SERPL DL<=0.05 MIU/L-ACNC: 0.63 UIU/ML (ref 0.27–4.2)
UROBILINOGEN UR STRIP-ACNC: 0.2 EU/DL (ref 0–1)
WBC # BLD: NORMAL 10*3/UL
WBC OTHER # BLD: 10 K/UL (ref 4–10.5)
WBC OTHER # BLD: 9.4 K/UL (ref 4–10.5)

## 2024-12-29 PROCEDURE — 83880 ASSAY OF NATRIURETIC PEPTIDE: CPT

## 2024-12-29 PROCEDURE — G0378 HOSPITAL OBSERVATION PER HR: HCPCS

## 2024-12-29 PROCEDURE — 87040 BLOOD CULTURE FOR BACTERIA: CPT

## 2024-12-29 PROCEDURE — 84443 ASSAY THYROID STIM HORMONE: CPT

## 2024-12-29 PROCEDURE — 85610 PROTHROMBIN TIME: CPT

## 2024-12-29 PROCEDURE — 84145 PROCALCITONIN (PCT): CPT

## 2024-12-29 PROCEDURE — 83735 ASSAY OF MAGNESIUM: CPT

## 2024-12-29 PROCEDURE — 94760 N-INVAS EAR/PLS OXIMETRY 1: CPT

## 2024-12-29 PROCEDURE — 96360 HYDRATION IV INFUSION INIT: CPT

## 2024-12-29 PROCEDURE — G0480 DRUG TEST DEF 1-7 CLASSES: HCPCS

## 2024-12-29 PROCEDURE — 85025 COMPLETE CBC W/AUTO DIFF WBC: CPT

## 2024-12-29 PROCEDURE — 80048 BASIC METABOLIC PNL TOTAL CA: CPT

## 2024-12-29 PROCEDURE — 84484 ASSAY OF TROPONIN QUANT: CPT

## 2024-12-29 PROCEDURE — 93010 ELECTROCARDIOGRAM REPORT: CPT | Performed by: INTERNAL MEDICINE

## 2024-12-29 PROCEDURE — 80307 DRUG TEST PRSMV CHEM ANLYZR: CPT

## 2024-12-29 PROCEDURE — 96361 HYDRATE IV INFUSION ADD-ON: CPT

## 2024-12-29 PROCEDURE — 99222 1ST HOSP IP/OBS MODERATE 55: CPT | Performed by: INTERNAL MEDICINE

## 2024-12-29 PROCEDURE — 71250 CT THORAX DX C-: CPT

## 2024-12-29 PROCEDURE — 2580000003 HC RX 258: Performed by: STUDENT IN AN ORGANIZED HEALTH CARE EDUCATION/TRAINING PROGRAM

## 2024-12-29 PROCEDURE — 36415 COLL VENOUS BLD VENIPUNCTURE: CPT

## 2024-12-29 PROCEDURE — 80053 COMPREHEN METABOLIC PANEL: CPT

## 2024-12-29 PROCEDURE — 81003 URINALYSIS AUTO W/O SCOPE: CPT

## 2024-12-29 PROCEDURE — 83605 ASSAY OF LACTIC ACID: CPT

## 2024-12-29 PROCEDURE — 6370000000 HC RX 637 (ALT 250 FOR IP): Performed by: STUDENT IN AN ORGANIZED HEALTH CARE EDUCATION/TRAINING PROGRAM

## 2024-12-29 RX ORDER — ACETAMINOPHEN 325 MG/1
650 TABLET ORAL EVERY 6 HOURS PRN
Status: DISCONTINUED | OUTPATIENT
Start: 2024-12-29 | End: 2024-12-31 | Stop reason: HOSPADM

## 2024-12-29 RX ORDER — POTASSIUM CHLORIDE 7.45 MG/ML
10 INJECTION INTRAVENOUS PRN
Status: DISCONTINUED | OUTPATIENT
Start: 2024-12-29 | End: 2024-12-31 | Stop reason: HOSPADM

## 2024-12-29 RX ORDER — MAGNESIUM SULFATE IN WATER 40 MG/ML
2000 INJECTION, SOLUTION INTRAVENOUS PRN
Status: DISCONTINUED | OUTPATIENT
Start: 2024-12-29 | End: 2024-12-31 | Stop reason: HOSPADM

## 2024-12-29 RX ORDER — ENOXAPARIN SODIUM 100 MG/ML
40 INJECTION SUBCUTANEOUS DAILY
Status: DISCONTINUED | OUTPATIENT
Start: 2024-12-29 | End: 2024-12-31 | Stop reason: HOSPADM

## 2024-12-29 RX ORDER — ALBUTEROL SULFATE 90 UG/1
2 INHALANT RESPIRATORY (INHALATION) EVERY 6 HOURS PRN
Status: DISCONTINUED | OUTPATIENT
Start: 2024-12-29 | End: 2024-12-31 | Stop reason: HOSPADM

## 2024-12-29 RX ORDER — METHOCARBAMOL 500 MG/1
750 TABLET, FILM COATED ORAL EVERY 6 HOURS PRN
Status: DISCONTINUED | OUTPATIENT
Start: 2024-12-29 | End: 2024-12-31 | Stop reason: HOSPADM

## 2024-12-29 RX ORDER — SODIUM CHLORIDE, SODIUM LACTATE, POTASSIUM CHLORIDE, CALCIUM CHLORIDE 600; 310; 30; 20 MG/100ML; MG/100ML; MG/100ML; MG/100ML
INJECTION, SOLUTION INTRAVENOUS CONTINUOUS
Status: ACTIVE | OUTPATIENT
Start: 2024-12-29 | End: 2024-12-29

## 2024-12-29 RX ORDER — 0.9 % SODIUM CHLORIDE 0.9 %
1000 INTRAVENOUS SOLUTION INTRAVENOUS ONCE
Status: COMPLETED | OUTPATIENT
Start: 2024-12-29 | End: 2024-12-29

## 2024-12-29 RX ORDER — ONDANSETRON 2 MG/ML
4 INJECTION INTRAMUSCULAR; INTRAVENOUS EVERY 6 HOURS PRN
Status: DISCONTINUED | OUTPATIENT
Start: 2024-12-29 | End: 2024-12-31 | Stop reason: HOSPADM

## 2024-12-29 RX ORDER — ACETAMINOPHEN 650 MG/1
650 SUPPOSITORY RECTAL EVERY 6 HOURS PRN
Status: DISCONTINUED | OUTPATIENT
Start: 2024-12-29 | End: 2024-12-31 | Stop reason: HOSPADM

## 2024-12-29 RX ORDER — POTASSIUM CHLORIDE 1500 MG/1
40 TABLET, EXTENDED RELEASE ORAL PRN
Status: DISCONTINUED | OUTPATIENT
Start: 2024-12-29 | End: 2024-12-31 | Stop reason: HOSPADM

## 2024-12-29 RX ORDER — SODIUM CHLORIDE 9 MG/ML
INJECTION, SOLUTION INTRAVENOUS PRN
Status: DISCONTINUED | OUTPATIENT
Start: 2024-12-29 | End: 2024-12-31 | Stop reason: HOSPADM

## 2024-12-29 RX ORDER — SODIUM CHLORIDE 0.9 % (FLUSH) 0.9 %
5-40 SYRINGE (ML) INJECTION PRN
Status: DISCONTINUED | OUTPATIENT
Start: 2024-12-29 | End: 2024-12-31 | Stop reason: HOSPADM

## 2024-12-29 RX ORDER — ONDANSETRON 4 MG/1
4 TABLET, ORALLY DISINTEGRATING ORAL EVERY 8 HOURS PRN
Status: DISCONTINUED | OUTPATIENT
Start: 2024-12-29 | End: 2024-12-31 | Stop reason: HOSPADM

## 2024-12-29 RX ORDER — DICYCLOMINE HCL 20 MG
20 TABLET ORAL EVERY 6 HOURS PRN
Status: DISCONTINUED | OUTPATIENT
Start: 2024-12-29 | End: 2024-12-31 | Stop reason: HOSPADM

## 2024-12-29 RX ORDER — HYDROXYZINE PAMOATE 25 MG/1
50 CAPSULE ORAL EVERY 8 HOURS PRN
Status: DISCONTINUED | OUTPATIENT
Start: 2024-12-29 | End: 2024-12-31 | Stop reason: HOSPADM

## 2024-12-29 RX ORDER — SODIUM CHLORIDE 0.9 % (FLUSH) 0.9 %
5-40 SYRINGE (ML) INJECTION EVERY 12 HOURS SCHEDULED
Status: DISCONTINUED | OUTPATIENT
Start: 2024-12-29 | End: 2024-12-31 | Stop reason: HOSPADM

## 2024-12-29 RX ORDER — BUPRENORPHINE AND NALOXONE 2; .5 MG/1; MG/1
2 FILM, SOLUBLE BUCCAL; SUBLINGUAL PRN
Status: DISCONTINUED | OUTPATIENT
Start: 2024-12-29 | End: 2024-12-30

## 2024-12-29 RX ORDER — POLYETHYLENE GLYCOL 3350 17 G/17G
17 POWDER, FOR SOLUTION ORAL DAILY PRN
Status: DISCONTINUED | OUTPATIENT
Start: 2024-12-29 | End: 2024-12-31 | Stop reason: HOSPADM

## 2024-12-29 RX ORDER — GABAPENTIN 300 MG/1
300 CAPSULE ORAL EVERY 8 HOURS PRN
Status: DISCONTINUED | OUTPATIENT
Start: 2024-12-29 | End: 2024-12-31 | Stop reason: HOSPADM

## 2024-12-29 RX ORDER — LOPERAMIDE HYDROCHLORIDE 2 MG/1
2 CAPSULE ORAL 4 TIMES DAILY PRN
Status: DISCONTINUED | OUTPATIENT
Start: 2024-12-29 | End: 2024-12-31 | Stop reason: HOSPADM

## 2024-12-29 RX ADMIN — SODIUM CHLORIDE, POTASSIUM CHLORIDE, SODIUM LACTATE AND CALCIUM CHLORIDE: 600; 310; 30; 20 INJECTION, SOLUTION INTRAVENOUS at 06:02

## 2024-12-29 RX ADMIN — MELATONIN TAB 3 MG 3 MG: 3 TAB at 19:53

## 2024-12-29 RX ADMIN — SODIUM CHLORIDE 1000 ML: 9 INJECTION, SOLUTION INTRAVENOUS at 01:35
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
## 2024-12-29 NOTE — PROGRESS NOTES
4 Eyes Skin Assessment     NAME:  Dayo Ling  YOB: 1970  MEDICAL RECORD NUMBER:  [id number]    The patient is being assessed for  Admission    I agree that at least one RN has performed a thorough Head to Toe Skin Assessment on the patient. ALL assessment sites listed below have been assessed.      Areas assessed by both nurses:    Head, Face, Ears, Shoulders, Back, Chest, Arms, Elbows, Hands, Sacrum. Buttock, Coccyx, Ischium, Legs. Feet and Heels, and Under Medical Devices         Does the Patient have a Wound? No noted wound(s)       Gadiel Prevention initiated by RN: No  Wound Care Orders initiated by RN: No    Pressure Injury (Stage 3,4, Unstageable, DTI, NWPT, and Complex wounds) if present, place Wound referral order by RN under : No    New Ostomies, if present place, Ostomy referral order under : No     Nurse 1 eSignature: Electronically signed by Shawn Somers RN on 12/29/24 at 6:55 AM EST    **SHARE this note so that the co-signing nurse can place an eSignature**    Nurse 2 eSignature: Electronically signed by Oliva Chow RN on 12/29/24 at 6:56 AM EST

## 2024-12-29 NOTE — ED NOTES
Abnormal; Notable for the following components:    NT Pro- (*)     All other components within normal limits   URINALYSIS WITH REFLEX TO CULTURE - Abnormal; Notable for the following components:    Specific Gravity, UA <1.005 (*)     All other components within normal limits        Background  History:   Past Medical History:   Diagnosis Date    Diabetes mellitus (HCC)     Hep C w/o coma, chronic (HCC)        Assessment    Vitals:        Vitals:    12/29/24 0200 12/29/24 0230 12/29/24 0300 12/29/24 0330   BP: (!) 110/59 (!) 122/109 (!) 127/90 114/66   Pulse: 78 77 73 89   Resp: 18 12 10 20   Temp:       TempSrc:       SpO2: 95% 96% 97% 93%   Weight:           PO Status: Regular    O2 Flow Rate: O2 Device: None (Room air)      Cardiac Rhythm: nsr    Last documented pain medication administered: na    NIH Score: NIH       Active LDA's:   Peripheral IV 12/29/24 Right Forearm (Active)       Pertinent or High Risk Medications/Drips: no   If Yes, please provide details: na      Blood Product Administration: no  If Yes, please provide details: na    Recommendation    Incomplete orders na  Additional Comments: na   If any further questions, please call Sending RN at [phone]    Electronically signed by: Electronically signed by Bishnu Power RN on 12/29/2024 at 4:24 AM
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
## 2024-12-30 LAB
ANION GAP SERPL CALCULATED.3IONS-SCNC: 12 MMOL/L (ref 9–17)
BASOPHILS # BLD: 0.04 K/UL
BASOPHILS NFR BLD: 1 % (ref 0–1)
BUN SERPL-MCNC: 11 MG/DL (ref 7–20)
CALCIUM SERPL-MCNC: 9.8 MG/DL (ref 8.3–10.6)
CHLORIDE SERPL-SCNC: 105 MMOL/L (ref 99–110)
CO2 SERPL-SCNC: 23 MMOL/L (ref 21–32)
CREAT SERPL-MCNC: 0.8 MG/DL (ref 0.9–1.3)
EOSINOPHIL # BLD: 0.01 K/UL
EOSINOPHILS RELATIVE PERCENT: 0 % (ref 0–3)
ERYTHROCYTE [DISTWIDTH] IN BLOOD BY AUTOMATED COUNT: 15.5 % (ref 11.7–14.9)
GFR, ESTIMATED: >90 ML/MIN/1.73M2
GLUCOSE SERPL-MCNC: 129 MG/DL (ref 74–99)
HCT VFR BLD AUTO: 34.8 % (ref 42–52)
HGB BLD-MCNC: 11.4 G/DL (ref 13.5–18)
IMM GRANULOCYTES # BLD AUTO: 0.03 K/UL
IMM GRANULOCYTES NFR BLD: 0 %
LYMPHOCYTES NFR BLD: 1.47 K/UL
LYMPHOCYTES RELATIVE PERCENT: 20 % (ref 24–44)
MCH RBC QN AUTO: 26.8 PG (ref 27–31)
MCHC RBC AUTO-ENTMCNC: 32.8 G/DL (ref 32–36)
MCV RBC AUTO: 81.9 FL (ref 78–100)
MONOCYTES NFR BLD: 0.81 K/UL
MONOCYTES NFR BLD: 11 % (ref 0–4)
NEUTROPHILS NFR BLD: 68 % (ref 36–66)
NEUTS SEG NFR BLD: 5.01 K/UL
PLATELET # BLD AUTO: 258 K/UL (ref 140–440)
PMV BLD AUTO: 12.4 FL (ref 7.5–11.1)
POTASSIUM SERPL-SCNC: 3.7 MMOL/L (ref 3.5–5.1)
RBC # BLD AUTO: 4.25 M/UL (ref 4.6–6.2)
SODIUM SERPL-SCNC: 140 MMOL/L (ref 136–145)
WBC OTHER # BLD: 7.4 K/UL (ref 4–10.5)

## 2024-12-30 PROCEDURE — G0378 HOSPITAL OBSERVATION PER HR: HCPCS

## 2024-12-30 PROCEDURE — APPNB15 APP NON BILLABLE TIME 0-15 MINS

## 2024-12-30 PROCEDURE — 85025 COMPLETE CBC W/AUTO DIFF WBC: CPT

## 2024-12-30 PROCEDURE — 36415 COLL VENOUS BLD VENIPUNCTURE: CPT

## 2024-12-30 PROCEDURE — 99232 SBSQ HOSP IP/OBS MODERATE 35: CPT | Performed by: INTERNAL MEDICINE

## 2024-12-30 PROCEDURE — 94761 N-INVAS EAR/PLS OXIMETRY MLT: CPT

## 2024-12-30 PROCEDURE — 80048 BASIC METABOLIC PNL TOTAL CA: CPT

## 2024-12-30 RX ORDER — GABAPENTIN 300 MG/1
300 CAPSULE ORAL EVERY 8 HOURS PRN
Qty: 30 CAPSULE | Refills: 0 | Status: SHIPPED | OUTPATIENT
Start: 2024-12-30 | End: 2025-01-09

## 2024-12-30 RX ORDER — HYDROXYZINE PAMOATE 50 MG/1
50 CAPSULE ORAL EVERY 8 HOURS PRN
Qty: 15 CAPSULE | Refills: 0 | Status: SHIPPED | OUTPATIENT
Start: 2024-12-30 | End: 2025-01-04

## 2024-12-30 RX ORDER — BUPRENORPHINE 2 MG/1
8 TABLET SUBLINGUAL DAILY
Status: DISCONTINUED | OUTPATIENT
Start: 2024-12-30 | End: 2024-12-30

## 2024-12-30 RX ORDER — LOPERAMIDE HYDROCHLORIDE 2 MG/1
2 CAPSULE ORAL 4 TIMES DAILY PRN
Qty: 16 CAPSULE | Refills: 0 | Status: SHIPPED | OUTPATIENT
Start: 2024-12-30 | End: 2025-01-03

## 2024-12-30 RX ORDER — METHOCARBAMOL 750 MG/1
750 TABLET, FILM COATED ORAL EVERY 6 HOURS PRN
Qty: 16 TABLET | Refills: 0 | Status: SHIPPED | OUTPATIENT
Start: 2024-12-30 | End: 2025-01-03

## 2024-12-30 RX ORDER — BUPRENORPHINE 2 MG/1
4 TABLET SUBLINGUAL EVERY 6 HOURS PRN
Status: DISCONTINUED | OUTPATIENT
Start: 2024-12-30 | End: 2024-12-31 | Stop reason: HOSPADM

## 2024-12-30 RX ORDER — DICYCLOMINE HCL 20 MG
20 TABLET ORAL EVERY 6 HOURS PRN
Qty: 16 TABLET | Refills: 0 | Status: SHIPPED | OUTPATIENT
Start: 2024-12-30 | End: 2025-01-03

## 2024-12-30 NOTE — PROGRESS NOTES
[place] [address]  Phone: [phone]    Fax [phone]                  Марина Manzano MD, [facility]       Mayco Brown MD, [facility]  Stephanie Chowdary MD, [facility]    MD Jemal Love MD Tariq Rizvi, MD Bilal Alam, MD Dr. Waseem Sajjad MD Melissa Kellis, APRBETTY Flores, FIFI Galindo, APRBETTY Naik, APRN  Karan Ricardo PA-C    CARDIOLOGY  NOTE      Name:  Dayo Ling /Age/Sex: 1970  (54 y.o. male)   MRN & CSN:  [id number] & [id number] Admission Date/Time: 2024  1:00 AM   [facility] PCP: No primary care provider on file.       Hospital Day: 2    - Cardiology consult is for: Syncope      ASSESSMENT/ PLAN:  RecurrentSyncope  -Patient refusing orthostatic blood pressures  -Echocardiogram in  showing low normal EF, no wall motion abnormalities.  -Patient is poor ischemic workup candidate due to substance abuse.  Blood pressure stable at this time-  -Outpatient 30-day event monitor if patient agreeable to follow-up  Substance abuse disorder  -Patient positive for cocaine and fentanyl  -Likely contributed to patient syncopal event  -PHILIPPE in  did reveal small echodensity on chordae tendon today of mitral valve.  Blood cultures were negative at the time.      Cardiology will sign off, please call with any questions.  Patient to follow-up in clinic           Subjective:  Dayo is a 54 y.o.year old   Patient very somnolent and difficult to arouse.  Patient reports vomiting from the question but often fall back asleep.  Patient not having any current cardiac complaint      Objective: Temperature:  Current - Temp: 99 °F (37.2 °C); Max - Temp  Av.3 °F (36.8 °C)  Min: 98 °F (36.7 °C)  Max: 99 °F (37.2 °C)    Respiratory Rate : Resp  Av  Min: 19  Max: 25    Pulse Range: Pulse  Av.5  Min: 43  Max: 52    Blood Presuure Range:
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
## 2024-12-30 NOTE — PROGRESS NOTES
Pt is refusing personal care at this time is dirty sheets on his bed and is wearing personal clothes and dirty hands pt is also throwing fluid on the ground he claims to be vomit the tech at this time states that he does not want to get cleaned up he is refusing orthostatic blood pressures and has a complaint of nausea and vomiting and states that he wants nothing for it care continues     Teley called and stated that his leads were off upon entering the room the pt is covered in stool and urine he is also refusing care to get cleaned up he is statin he wants us to leave him alone and that he is about to go home the provider is aware     Pt is still refusing all care and will not get cleaned up and refusing assessments at this time provider is aware of all refusals care continues

## 2024-12-30 NOTE — CARE COORDINATION
CM reviewed medical chart at this time to screen pt for discharge planning. Pt from home, independent, has insurance. Will return home. Addiction resources and PCP list added to pt's discharge instructions. Cm available should needs arise.      12/30/24 1210   Service Assessment   Patient Orientation Alert and Oriented   Cognition Alert   History Provided By Medical Record   Primary Caregiver Self   Accompanied By/Relationship N/A   Support Systems Parent   Patient's Healthcare Decision Maker is: Legal Next of Kin   PCP Verified by CM No  (No PCP)   Prior Functional Level Independent in ADLs/IADLs   Current Functional Level Independent in ADLs/IADLs   Can patient return to prior living arrangement Yes   Ability to make needs known: Good   Family able to assist with home care needs: Yes   Would you like for me to discuss the discharge plan with any other family members/significant others, and if so, who? No   Financial Resources Medicaid   Community Resources None   CM/SW Referral Other (see comment)  (Discharge planning assessment)

## 2024-12-30 NOTE — PROGRESS NOTES
Pt requested subutex for withdrawal, medication pulled, returned to room and pt will not wake up enough to even place pills under tongue, R 18 . [facility]    Joanne aBrbour RN

## 2024-12-30 NOTE — DISCHARGE INSTRUCTIONS
[place] [phone]      Addiction Support and Treatment Options Near [facility]  Outpatient treatment for both men & women  [address]   [facility] [address]  [phone]    [address] [facility] [address]  [phone]    [facility]    [address] [facility] [address]  [phone]  Intensive Outpatient and Residential    [facility]        [address] [facility], [address]  [phone]    [facility]   (various other locations in [place]/ visit [email or web address])  [address] [place] [address]  [phone]    [facility]   [address] [facility], ,  [facility], [id number] [phone]    [facility]   [address]  [place] [address]  [phone]      [facility]   [address] [place] [address]  [phone]    [facility]  [address] [place] [address]  [phone]    [facility]:   [address] [place] [address]  [phone]    [facility]  [facility] [place] [address] [place] [address]  [phone]  [email or web address]    [address], [place] [address] [phone]  Fax [phone]    [address] [facility] [address]  [place], [place] [address]  [phone]  Fax [phone]    [address] [place] [address] [phone]  Fax [phone]     [facility]  [facility] [place], [place] [address]  phone: [phone]       Call to schedule follow up hospital follow up appointment:   [facility]  [address]  [address]  [place], [place] [address]  Tel: [phone]    [facility]  [address]  [place], [place] [address]  [phone]     [facility]   [address], [place]   [phone]     2. Establish care with a primary care provider  Physician Finder [phone]

## 2024-12-30 NOTE — PLAN OF CARE
Problem: Chronic Conditions and Co-morbidities  Goal: Patient's chronic conditions and co-morbidity symptoms are monitored and maintained or improved  12/30/2024 1643 by Nimesh Tamez RN  Outcome: Progressing  12/30/2024 0500 by Mark Serrato RN  Outcome: Progressing     Problem: Discharge Planning  Goal: Discharge to home or other facility with appropriate resources  12/30/2024 1643 by Nimesh Tamez RN  Outcome: Progressing  12/30/2024 0500 by Mark Serrato RN  Outcome: Progressing     Problem: Safety - Adult  Goal: Free from fall injury  12/30/2024 1643 by Nimesh Tamez RN  Outcome: Progressing  12/30/2024 0500 by Mark Serrato RN  Outcome: Progressing

## 2024-12-31 VITALS
SYSTOLIC BLOOD PRESSURE: 163 MMHG | WEIGHT: 147.71 LBS | HEIGHT: 71 IN | HEART RATE: 43 BPM | OXYGEN SATURATION: 92 % | TEMPERATURE: 99 F | RESPIRATION RATE: 25 BRPM | BODY MASS INDEX: 20.68 KG/M2 | DIASTOLIC BLOOD PRESSURE: 98 MMHG

## 2024-12-31 LAB
ALBUMIN SERPL-MCNC: 4.1 G/DL (ref 3.4–5)
ALBUMIN/GLOB SERPL: 1.1 {RATIO} (ref 1.1–2.2)
ALP SERPL-CCNC: 85 U/L (ref 40–129)
ALT SERPL-CCNC: 6 U/L (ref 10–40)
ANION GAP SERPL CALCULATED.3IONS-SCNC: 11 MMOL/L (ref 9–17)
AST SERPL-CCNC: 12 U/L (ref 15–37)
BASOPHILS # BLD: 0.02 K/UL
BASOPHILS NFR BLD: 0 % (ref 0–1)
BILIRUB SERPL-MCNC: 0.6 MG/DL (ref 0–1)
BUN SERPL-MCNC: 18 MG/DL (ref 7–20)
CALCIUM SERPL-MCNC: 9.9 MG/DL (ref 8.3–10.6)
CHLORIDE SERPL-SCNC: 101 MMOL/L (ref 99–110)
CO2 SERPL-SCNC: 26 MMOL/L (ref 21–32)
CREAT SERPL-MCNC: 0.8 MG/DL (ref 0.9–1.3)
EOSINOPHIL # BLD: 0 K/UL
EOSINOPHILS RELATIVE PERCENT: 0 % (ref 0–3)
ERYTHROCYTE [DISTWIDTH] IN BLOOD BY AUTOMATED COUNT: 15.2 % (ref 11.7–14.9)
GFR, ESTIMATED: >90 ML/MIN/1.73M2
GLUCOSE SERPL-MCNC: 141 MG/DL (ref 74–99)
HCT VFR BLD AUTO: 38.1 % (ref 42–52)
HGB BLD-MCNC: 12.6 G/DL (ref 13.5–18)
IMM GRANULOCYTES # BLD AUTO: 0.02 K/UL
IMM GRANULOCYTES NFR BLD: 0 %
LYMPHOCYTES NFR BLD: 1.97 K/UL
LYMPHOCYTES RELATIVE PERCENT: 22 % (ref 24–44)
MCH RBC QN AUTO: 26.8 PG (ref 27–31)
MCHC RBC AUTO-ENTMCNC: 33.1 G/DL (ref 32–36)
MCV RBC AUTO: 81.1 FL (ref 78–100)
MONOCYTES NFR BLD: 1.11 K/UL
MONOCYTES NFR BLD: 13 % (ref 0–4)
NEUTROPHILS NFR BLD: 65 % (ref 36–66)
NEUTS SEG NFR BLD: 5.78 K/UL
PLATELET # BLD AUTO: 374 K/UL (ref 140–440)
PMV BLD AUTO: 12 FL (ref 7.5–11.1)
POTASSIUM SERPL-SCNC: 3.3 MMOL/L (ref 3.5–5.1)
PROT SERPL-MCNC: 8 G/DL (ref 6.4–8.2)
RBC # BLD AUTO: 4.7 M/UL (ref 4.6–6.2)
SODIUM SERPL-SCNC: 138 MMOL/L (ref 136–145)
WBC OTHER # BLD: 8.9 K/UL (ref 4–10.5)

## 2024-12-31 PROCEDURE — G0378 HOSPITAL OBSERVATION PER HR: HCPCS

## 2024-12-31 PROCEDURE — 80053 COMPREHEN METABOLIC PANEL: CPT

## 2024-12-31 PROCEDURE — 85025 COMPLETE CBC W/AUTO DIFF WBC: CPT

## 2024-12-31 PROCEDURE — 36415 COLL VENOUS BLD VENIPUNCTURE: CPT

## 2024-12-31 NOTE — PROGRESS NOTES
Patient made aware of low potassium this am, education provided, and patient continues to refuse medication at this time. Patient stated \"if it drops lower and he do not feel well, will then take the medication\". Next shift nurse was made aware of potassium value.

## 2024-12-31 NOTE — PLAN OF CARE
Problem: Chronic Conditions and Co-morbidities  Goal: Patient's chronic conditions and co-morbidity symptoms are monitored and maintained or improved  12/30/2024 2330 by Cristina Mtz RN  Outcome: Progressing  Flowsheets (Taken 12/30/2024 2116)  Care Plan - Patient's Chronic Conditions and Co-Morbidity Symptoms are Monitored and Maintained or Improved:   Monitor and assess patient's chronic conditions and comorbid symptoms for stability, deterioration, or improvement   Collaborate with multidisciplinary team to address chronic and comorbid conditions and prevent exacerbation or deterioration   Update acute care plan with appropriate goals if chronic or comorbid symptoms are exacerbated and prevent overall improvement and discharge  12/30/2024 1643 by Nimesh Tamez RN  Outcome: Progressing     Problem: Discharge Planning  Goal: Discharge to home or other facility with appropriate resources  12/30/2024 2330 by Cristina Mtz RN  Outcome: Progressing  Flowsheets (Taken 12/30/2024 2116)  Discharge to home or other facility with appropriate resources: Identify barriers to discharge with patient and caregiver  12/30/2024 1643 by Nimesh Tamez RN  Outcome: Progressing     Problem: Safety - Adult  Goal: Free from fall injury  12/30/2024 2330 by Cristina Mtz RN  Outcome: Progressing  12/30/2024 1643 by Nimesh Tamez RN  Outcome: Progressing     Problem: Skin/Tissue Integrity  Goal: Absence of new skin breakdown  Description: 1.  Monitor for areas of redness and/or skin breakdown  2.  Assess vascular access sites hourly  3.  Every 4-6 hours minimum:  Change oxygen saturation probe site  4.  Every 4-6 hours:  If on nasal continuous positive airway pressure, respiratory therapy assess nares and determine need for appliance change or resting period.  Outcome: Progressing

## 2024-12-31 NOTE — PROGRESS NOTES
Made x2 attempts to get vitals and asseessment on this patient. Patient refused, and stated will let nurse know if he finds a ride to leave. Refused medication as well stated this hospital do not have him on the right medication.

## 2025-01-04 LAB
MICROORGANISM SPEC CULT: NORMAL
MICROORGANISM SPEC CULT: NORMAL
SERVICE CMNT-IMP: NORMAL
SERVICE CMNT-IMP: NORMAL
SPECIMEN DESCRIPTION: NORMAL
SPECIMEN DESCRIPTION: NORMAL

## 2025-01-13 ENCOUNTER — TELEPHONE (OUTPATIENT)
Dept: CARDIOLOGY CLINIC | Age: 55
End: 2025-01-13

## 2025-01-13 NOTE — TELEPHONE ENCOUNTER
----- Message from CARLEE NOE LPN sent at 1/13/2025 11:22 AM EST -----  Regarding: FW: event monitor  Please schedule  ----- Message -----  From: Lanette Flores APRN - CNP  Sent: 12/29/2024  10:18 AM EST  To: [facility] Clinical Staff Pool  Subject: event monitor                                    Needs event monitor placed

## 2025-01-13 NOTE — TELEPHONE ENCOUNTER
Attempted to reach pt to schedule event monitor placement, got message stating 'the number is unreachable\". Do not see a patient communication in media stating another person that can be callwd

## 2025-01-14 ENCOUNTER — TELEPHONE (OUTPATIENT)
Dept: CARDIOLOGY CLINIC | Age: 55
End: 2025-01-14

## 2025-01-14 NOTE — TELEPHONE ENCOUNTER
----- Message from CARLEE NOE LPN sent at 1/13/2025 11:22 AM EST -----  Regarding: FW: event monitor  Please schedule  ----- Message -----  From: Lanette Flores APRN - CNP  Sent: 12/29/2024  10:18 AM EST  To: [facility] Clinical Staff Pool  Subject: event monitor                                    Needs event monitor placed

## 2025-03-21 ENCOUNTER — APPOINTMENT (OUTPATIENT)
Dept: GENERAL RADIOLOGY | Age: 55
End: 2025-03-21
Payer: COMMERCIAL

## 2025-03-21 ENCOUNTER — HOSPITAL ENCOUNTER (EMERGENCY)
Age: 55
Discharge: HOME | End: 2025-03-21
Attending: STUDENT IN AN ORGANIZED HEALTH CARE EDUCATION/TRAINING PROGRAM
Payer: COMMERCIAL

## 2025-03-21 VITALS
SYSTOLIC BLOOD PRESSURE: 128 MMHG | OXYGEN SATURATION: 98 % | TEMPERATURE: 98.3 F | HEART RATE: 95 BPM | RESPIRATION RATE: 16 BRPM | DIASTOLIC BLOOD PRESSURE: 82 MMHG

## 2025-03-21 DIAGNOSIS — S93.401A SPRAIN OF RIGHT ANKLE, UNSPECIFIED LIGAMENT, INITIAL ENCOUNTER: Primary | ICD-10-CM

## 2025-03-21 PROCEDURE — 99283 EMERGENCY DEPT VISIT LOW MDM: CPT

## 2025-03-21 PROCEDURE — 73610 X-RAY EXAM OF ANKLE: CPT

## 2025-03-21 PROCEDURE — 73620 X-RAY EXAM OF FOOT: CPT

## 2025-03-21 RX ORDER — IBUPROFEN 400 MG/1
400 TABLET, FILM COATED ORAL EVERY 6 HOURS PRN
Qty: 14 TABLET | Refills: 0 | Status: SHIPPED | OUTPATIENT
Start: 2025-03-21

## 2025-03-21 RX ORDER — ACETAMINOPHEN 500 MG
1000 TABLET ORAL
Status: DISCONTINUED | OUTPATIENT
Start: 2025-03-21 | End: 2025-03-21 | Stop reason: HOSPADM

## 2025-03-21 ASSESSMENT — PAIN SCALES - GENERAL
PAINLEVEL_OUTOF10: 10
PAINLEVEL_OUTOF10: 0
PAINLEVEL_OUTOF10: 10

## 2025-03-21 ASSESSMENT — PAIN - FUNCTIONAL ASSESSMENT: PAIN_FUNCTIONAL_ASSESSMENT: 0-10

## 2025-03-21 ASSESSMENT — PAIN DESCRIPTION - LOCATION: LOCATION: FOOT

## 2025-03-21 ASSESSMENT — PAIN DESCRIPTION - ORIENTATION: ORIENTATION: RIGHT

## 2025-03-21 ASSESSMENT — PAIN DESCRIPTION - DESCRIPTORS: DESCRIPTORS: ACHING

## 2025-03-21 NOTE — ED PROVIDER NOTES
Emergency Department Encounter    Patient: Dayo Ling  MRN: [id number]  : 1970  Date of Evaluation: 3/21/2025  ED Provider:  Edwin Kramer DO    Triage Chief Complaint:   Foot Injury (R foot pt fell and landed on it wrong. Pt denies any other injury at this time. )    [place]:  Dayo Ling is a 55 y.o. male that presents after injury to his right foot and ankle.  States that he was going down steps and he was towards the bottom of the staircase when he stumbled down and landed on his right ankle wrong.  Describes an eversion mechanism of his ankle.  Denies hitting his head no LOC no anticoagulant use.  Has been able to walk but is having a limp and significant pain.    MDM:    History from : Patient    Patient with normal vital signs overall appears well is not in any significant distress.  He does have swelling is primarily in the medial portion of his ankle.  There is some ecchymoses as well.  Suspect he likely has an ankle sprain versus some ligamentous tear as well.  He does have some imitations in dorsiflexion and plantarflexion.  X-rays obtained and does not show any acute fractures.  Ace wrap and ankle stirrup provided.  Patient was given crutches to be used as needed.  Referral to podiatry for reevaluation           Patient was given the following medications:  Medications   acetaminophen (TYLENOL) tablet 1,000 mg (1,000 mg Oral Not Given 3/21/25 1538)       Discharge condition: stable    I am the Primary Clinician of Record.    Is this patient to be included in the SEP-1 Core Measure due to severe sepsis or septic shock?   No   Exclusion criteria - the patient is NOT to be included for SEP-1 Core Measure due to:  Infection is not suspected        ROS - see HPI, below listed is current ROS at time of my eval:  systems reviewed and negative except as above.     Past Medical History:   Diagnosis Date    Diabetes mellitus (HCC)     Hep C w/o coma, chronic (HCC)      Past Surgical History:  [address]  [phone]  Go today  If symptoms worsen    Mark Woods DPM  [address]  [facility] [address]  [phone]    Call today  To make an appointment for reevaluation of your ankle    Disposition medications (if applicable):  New Prescriptions    IBUPROFEN (IBU) 400 MG TABLET    Take 1 tablet by mouth every 6 hours as needed for Pain     ED Provider Disposition Time  DISPOSITION Decision To Discharge 03/21/2025 03:59:07 PM   DISPOSITION CONDITION Stable           Comment: Please note this report has been produced using speech recognition software and may contain errors related to that system including errors in grammar, punctuation, and spelling, as well as words and phrases that may be inappropriate.  Efforts were made to edit the dictations.        Edwin Kramer DO  03/21/25 4865

## 2025-05-21 ENCOUNTER — APPOINTMENT (OUTPATIENT)
Dept: GENERAL RADIOLOGY | Age: 55
End: 2025-05-21
Attending: STUDENT IN AN ORGANIZED HEALTH CARE EDUCATION/TRAINING PROGRAM
Payer: OTHER MISCELLANEOUS

## 2025-05-21 ENCOUNTER — APPOINTMENT (OUTPATIENT)
Dept: CT IMAGING | Age: 55
End: 2025-05-21
Payer: OTHER MISCELLANEOUS

## 2025-05-21 ENCOUNTER — HOSPITAL ENCOUNTER (EMERGENCY)
Age: 55
Discharge: ANOTHER HOSPITAL | End: 2025-05-21
Attending: STUDENT IN AN ORGANIZED HEALTH CARE EDUCATION/TRAINING PROGRAM
Payer: OTHER MISCELLANEOUS

## 2025-05-21 VITALS
HEIGHT: 71 IN | WEIGHT: 147 LBS | TEMPERATURE: 98.2 F | RESPIRATION RATE: 17 BRPM | OXYGEN SATURATION: 100 % | BODY MASS INDEX: 20.58 KG/M2 | HEART RATE: 74 BPM | SYSTOLIC BLOOD PRESSURE: 132 MMHG | DIASTOLIC BLOOD PRESSURE: 81 MMHG
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
## 2025-05-21 DIAGNOSIS — M25.512 ACUTE PAIN OF LEFT SHOULDER: ICD-10-CM

## 2025-05-21 DIAGNOSIS — T79.7XXA SUBCUTANEOUS EMPHYSEMA, INITIAL ENCOUNTER: Primary | ICD-10-CM

## 2025-05-21 DIAGNOSIS — S09.90XA CLOSED HEAD INJURY, INITIAL ENCOUNTER: ICD-10-CM

## 2025-05-21 LAB
ABO + RH BLD: NORMAL
ABSOLUTE BANDS: 0.25 K/UL
ANION GAP SERPL CALCULATED.3IONS-SCNC: 12 MMOL/L (ref 9–17)
BANDS: 2 %
BASOPHILS # BLD: 0.13 K/UL
BASOPHILS NFR BLD: 1 % (ref 0–1)
BLOOD BANK SAMPLE EXPIRATION: NORMAL
BLOOD GROUP ANTIBODIES SERPL: NEGATIVE
BUN SERPL-MCNC: 10 MG/DL (ref 7–20)
CALCIUM SERPL-MCNC: 9.1 MG/DL (ref 8.3–10.6)
CHLORIDE SERPL-SCNC: 101 MMOL/L (ref 99–110)
CO2 SERPL-SCNC: 24 MMOL/L (ref 21–32)
CREAT SERPL-MCNC: 1 MG/DL (ref 0.9–1.3)
EKG ATRIAL RATE: 80 BPM
EKG DIAGNOSIS: NORMAL
EKG P AXIS: 70 DEGREES
EKG P-R INTERVAL: 156 MS
EKG Q-T INTERVAL: 404 MS
EKG QRS DURATION: 100 MS
EKG QTC CALCULATION (BAZETT): 465 MS
EKG R AXIS: 59 DEGREES
EKG T AXIS: 72 DEGREES
EKG VENTRICULAR RATE: 80 BPM
EOSINOPHIL # BLD: 0.13 K/UL
EOSINOPHILS RELATIVE PERCENT: 1 % (ref 0–3)
ERYTHROCYTE [DISTWIDTH] IN BLOOD BY AUTOMATED COUNT: 15.6 % (ref 11.7–14.9)
GFR, ESTIMATED: 82 ML/MIN/1.73M2
GLUCOSE BLD-MCNC: 153 MG/DL (ref 74–99)
GLUCOSE SERPL-MCNC: 125 MG/DL (ref 74–99)
HCT VFR BLD AUTO: 39.6 % (ref 42–52)
HGB BLD-MCNC: 12.3 G/DL (ref 13.5–18)
INR PPP: 0.9
LYMPHOCYTES NFR BLD: 6.3 K/UL
LYMPHOCYTES RELATIVE PERCENT: 50 % (ref 24–44)
MCH RBC QN AUTO: 26.4 PG (ref 27–31)
MCHC RBC AUTO-ENTMCNC: 31.1 G/DL (ref 32–36)
MCV RBC AUTO: 85 FL (ref 78–100)
MONOCYTES NFR BLD: 1.76 K/UL
MONOCYTES NFR BLD: 14 % (ref 0–5)
NEUTROPHILS NFR BLD: 32 % (ref 36–66)
NEUTS SEG NFR BLD: 4.03 K/UL
PLATELET # BLD AUTO: 421 K/UL (ref 140–440)
PLATELET ESTIMATE: NORMAL
PMV BLD AUTO: 9.9 FL (ref 7.5–11.1)
POTASSIUM SERPL-SCNC: 4.3 MMOL/L (ref 3.5–5.1)
PROTHROMBIN TIME: 12.9 SEC (ref 11.7–14.5)
RBC # BLD AUTO: 4.66 M/UL (ref 4.6–6.2)
RBC # BLD: ABNORMAL 10*6/UL
RBC # BLD: NORMAL 10*6/UL
SODIUM SERPL-SCNC: 137 MMOL/L (ref 136–145)
TROPONIN I SERPL HS-MCNC: 6 NG/L (ref 0–22)
TROPONIN I SERPL HS-MCNC: 7 NG/L (ref 0–22)
WBC # BLD: NORMAL 10*3/UL
WBC OTHER # BLD: 12.6 K/UL (ref 4–10.5)

## 2025-05-21 PROCEDURE — 85610 PROTHROMBIN TIME: CPT

## 2025-05-21 PROCEDURE — 72125 CT NECK SPINE W/O DYE: CPT

## 2025-05-21 PROCEDURE — 76376 3D RENDER W/INTRP POSTPROCES: CPT

## 2025-05-21 PROCEDURE — 93010 ELECTROCARDIOGRAM REPORT: CPT | Performed by: INTERNAL MEDICINE

## 2025-05-21 PROCEDURE — 6360000004 HC RX CONTRAST MEDICATION: Performed by: STUDENT IN AN ORGANIZED HEALTH CARE EDUCATION/TRAINING PROGRAM

## 2025-05-21 PROCEDURE — 70490 CT SOFT TISSUE NECK W/O DYE: CPT

## 2025-05-21 PROCEDURE — 73030 X-RAY EXAM OF SHOULDER: CPT

## 2025-05-21 PROCEDURE — 99285 EMERGENCY DEPT VISIT HI MDM: CPT

## 2025-05-21 PROCEDURE — 85025 COMPLETE CBC W/AUTO DIFF WBC: CPT

## 2025-05-21 PROCEDURE — 93005 ELECTROCARDIOGRAM TRACING: CPT | Performed by: STUDENT IN AN ORGANIZED HEALTH CARE EDUCATION/TRAINING PROGRAM

## 2025-05-21 PROCEDURE — 80048 BASIC METABOLIC PNL TOTAL CA: CPT

## 2025-05-21 PROCEDURE — 73610 X-RAY EXAM OF ANKLE: CPT

## 2025-05-21 PROCEDURE — 84484 ASSAY OF TROPONIN QUANT: CPT

## 2025-05-21 PROCEDURE — 70450 CT HEAD/BRAIN W/O DYE: CPT

## 2025-05-21 PROCEDURE — 86900 BLOOD TYPING SEROLOGIC ABO: CPT

## 2025-05-21 PROCEDURE — 82962 GLUCOSE BLOOD TEST: CPT

## 2025-05-21 PROCEDURE — 71275 CT ANGIOGRAPHY CHEST: CPT

## 2025-05-21 PROCEDURE — 86850 RBC ANTIBODY SCREEN: CPT

## 2025-05-21 PROCEDURE — 86901 BLOOD TYPING SEROLOGIC RH(D): CPT

## 2025-05-21 RX ORDER — IOPAMIDOL 755 MG/ML
80 INJECTION, SOLUTION INTRAVASCULAR
Status: COMPLETED | OUTPATIENT
Start: 2025-05-21 | End: 2025-05-21

## 2025-05-21 RX ADMIN — IOPAMIDOL 80 ML: 755 INJECTION, SOLUTION INTRAVENOUS at 10:23

## 2025-05-21 ASSESSMENT — PAIN DESCRIPTION - PAIN TYPE: TYPE: ACUTE PAIN

## 2025-05-21 ASSESSMENT — PAIN - FUNCTIONAL ASSESSMENT
PAIN_FUNCTIONAL_ASSESSMENT: 0-10
PAIN_FUNCTIONAL_ASSESSMENT: 0-10

## 2025-05-21 ASSESSMENT — LIFESTYLE VARIABLES
HOW OFTEN DO YOU HAVE A DRINK CONTAINING ALCOHOL: NEVER
HOW MANY STANDARD DRINKS CONTAINING ALCOHOL DO YOU HAVE ON A TYPICAL DAY: PATIENT DOES NOT DRINK

## 2025-05-21 ASSESSMENT — PAIN DESCRIPTION - DESCRIPTORS: DESCRIPTORS: STABBING

## 2025-05-21 ASSESSMENT — PAIN DESCRIPTION - LOCATION: LOCATION: ABDOMEN

## 2025-05-21 ASSESSMENT — PAIN DESCRIPTION - ORIENTATION: ORIENTATION: RIGHT;LEFT

## 2025-05-21 ASSESSMENT — PAIN SCALES - GENERAL: PAINLEVEL_OUTOF10: 10
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
## 2025-05-21 NOTE — ED PROVIDER NOTES
OPTIMIZATION: CT radiation dose optimization techniques (automated exposure  control, and use of iterative reconstruction techniques, or adjustment of the mA and/or kV according to patient size) were used to limit patient radiation dose. FINDINGS: Atrophy: No atrophy Ventricles:  Normal size and contour.  Mass effect/midline shift:  None Hemorrhage:  No intra- or extra-axial hemorrhage Parenchyma:  No focal abnormality is seen Vasculature:  Grossly normal.  Technique not optimized for evaluation. Osseous Structures:  No focal osseous abnormality Paranasal Sinuses/Mastoid Air cells:  Mucosal thickening involving the paranasal  sinuses. Mastoid air cells are clear. Orbits:  Visualized portions have a normal noncontrasted appearance. Scalp/Soft tissues:  No focal abnormality IMPRESSION: 1.   No acute intracranial findings.  There is no evidence for hemorrhage, mass effect, or acute large territory infarct. 2.  Chronic paranasal sinus disease  Dictated and Electronically Signed By: Freddy Mcintosh MD [facility] 5/21/2025 10:22            Discussion with Other Professionals : Consultant Dr. Silverio and [facility] ED doc    Records Reviewed : Other as noted above    Chronic conditions affecting care:  IV drug    Disposition Considerations (tests considered but not done, Shared Decision Making, Patient Expectation of Test or Treatment): As noted above    Patient was given the following medications:  Medications   tetanus-diphth-acell pertussis (BOOSTRIX) injection 0.5 mL (has no administration in time range)   iopamidol (ISOVUE-370) 76 % injection 80 mL (80 mLs IntraVENous Given 5/21/25 1023)         Is this patient to be included in the SEP-1 Core Measure due to severe sepsis or septic shock?   No   Exclusion criteria - the patient is NOT to be included for SEP-1 Core Measure due to:  Infection is not suspected    Disposition: Transferred to [facility]     I am the primary physician of

## 2025-05-21 NOTE — ED NOTES
[facility] here to transport patient to [facility] at this time. Patient transferred to stretcher and left facility without incident.